# Patient Record
Sex: FEMALE | Race: AMERICAN INDIAN OR ALASKA NATIVE | Employment: OTHER | URBAN - METROPOLITAN AREA
[De-identification: names, ages, dates, MRNs, and addresses within clinical notes are randomized per-mention and may not be internally consistent; named-entity substitution may affect disease eponyms.]

---

## 2017-01-22 ENCOUNTER — HOSPITAL ENCOUNTER (EMERGENCY)
Age: 77
Discharge: HOME | End: 2017-01-22
Attending: EMERGENCY MEDICINE
Payer: MEDICARE

## 2017-01-22 ENCOUNTER — APPOINTMENT (OUTPATIENT)
Dept: GENERAL RADIOLOGY | Age: 77
End: 2017-01-22
Attending: EMERGENCY MEDICINE
Payer: MEDICARE

## 2017-01-22 VITALS
HEART RATE: 77 BPM | OXYGEN SATURATION: 96 % | DIASTOLIC BLOOD PRESSURE: 59 MMHG | BODY MASS INDEX: 25.72 KG/M2 | HEIGHT: 66 IN | TEMPERATURE: 99.1 F | SYSTOLIC BLOOD PRESSURE: 138 MMHG | WEIGHT: 160.05 LBS | RESPIRATION RATE: 18 BRPM

## 2017-01-22 DIAGNOSIS — J20.9 ACUTE BRONCHITIS, UNSPECIFIED ORGANISM: Primary | ICD-10-CM

## 2017-01-22 LAB
ALBUMIN SERPL BCP-MCNC: 3.4 G/DL (ref 3.5–5)
ALBUMIN/GLOB SERPL: 0.9 {RATIO} (ref 1.1–2.2)
ALP SERPL-CCNC: 61 U/L (ref 45–117)
ALT SERPL-CCNC: 15 U/L (ref 12–78)
ANION GAP BLD CALC-SCNC: 10 MMOL/L (ref 5–15)
AST SERPL W P-5'-P-CCNC: 15 U/L (ref 15–37)
BASOPHILS # BLD AUTO: 0 K/UL (ref 0–0.1)
BASOPHILS # BLD: 0 % (ref 0–1)
BILIRUB SERPL-MCNC: 0.1 MG/DL (ref 0.2–1)
BUN SERPL-MCNC: 16 MG/DL (ref 6–20)
BUN/CREAT SERPL: 18 (ref 12–20)
CALCIUM SERPL-MCNC: 8.9 MG/DL (ref 8.5–10.1)
CHLORIDE SERPL-SCNC: 105 MMOL/L (ref 97–108)
CO2 SERPL-SCNC: 25 MMOL/L (ref 21–32)
CREAT SERPL-MCNC: 0.87 MG/DL (ref 0.55–1.02)
EOSINOPHIL # BLD: 0.3 K/UL (ref 0–0.4)
EOSINOPHIL NFR BLD: 2 % (ref 0–7)
ERYTHROCYTE [DISTWIDTH] IN BLOOD BY AUTOMATED COUNT: 14.5 % (ref 11.5–14.5)
GLOBULIN SER CALC-MCNC: 4 G/DL (ref 2–4)
GLUCOSE SERPL-MCNC: 107 MG/DL (ref 65–100)
HCT VFR BLD AUTO: 32 % (ref 35–47)
HGB BLD-MCNC: 10.7 G/DL (ref 11.5–16)
LYMPHOCYTES # BLD AUTO: 27 % (ref 12–49)
LYMPHOCYTES # BLD: 3.5 K/UL (ref 0.8–3.5)
MAGNESIUM SERPL-MCNC: 2.1 MG/DL (ref 1.6–2.4)
MCH RBC QN AUTO: 28.4 PG (ref 26–34)
MCHC RBC AUTO-ENTMCNC: 33.4 G/DL (ref 30–36.5)
MCV RBC AUTO: 84.9 FL (ref 80–99)
MONOCYTES # BLD: 1.1 K/UL (ref 0–1)
MONOCYTES NFR BLD AUTO: 9 % (ref 5–13)
NEUTS SEG # BLD: 8.1 K/UL (ref 1.8–8)
NEUTS SEG NFR BLD AUTO: 62 % (ref 32–75)
PLATELET # BLD AUTO: 242 K/UL (ref 150–400)
POTASSIUM SERPL-SCNC: 4.1 MMOL/L (ref 3.5–5.1)
PROT SERPL-MCNC: 7.4 G/DL (ref 6.4–8.2)
RBC # BLD AUTO: 3.77 M/UL (ref 3.8–5.2)
SODIUM SERPL-SCNC: 140 MMOL/L (ref 136–145)
WBC # BLD AUTO: 13 K/UL (ref 3.6–11)

## 2017-01-22 PROCEDURE — 83735 ASSAY OF MAGNESIUM: CPT | Performed by: EMERGENCY MEDICINE

## 2017-01-22 PROCEDURE — A9270 NON-COVERED ITEM OR SERVICE: HCPCS | Performed by: EMERGENCY MEDICINE

## 2017-01-22 PROCEDURE — 94640 AIRWAY INHALATION TREATMENT: CPT

## 2017-01-22 PROCEDURE — 74011000250 HC RX REV CODE- 250: Performed by: EMERGENCY MEDICINE

## 2017-01-22 PROCEDURE — 71020 XR CHEST PA LAT: CPT

## 2017-01-22 PROCEDURE — 77030029684 HC NEB SM VOL KT MONA -A

## 2017-01-22 PROCEDURE — 85025 COMPLETE CBC W/AUTO DIFF WBC: CPT | Performed by: EMERGENCY MEDICINE

## 2017-01-22 PROCEDURE — 36415 COLL VENOUS BLD VENIPUNCTURE: CPT | Performed by: EMERGENCY MEDICINE

## 2017-01-22 PROCEDURE — 74011636637 HC RX REV CODE- 636/637: Performed by: EMERGENCY MEDICINE

## 2017-01-22 PROCEDURE — 80053 COMPREHEN METABOLIC PANEL: CPT | Performed by: EMERGENCY MEDICINE

## 2017-01-22 PROCEDURE — 99283 EMERGENCY DEPT VISIT LOW MDM: CPT

## 2017-01-22 RX ORDER — IPRATROPIUM BROMIDE AND ALBUTEROL SULFATE 2.5; .5 MG/3ML; MG/3ML
3 SOLUTION RESPIRATORY (INHALATION)
Status: COMPLETED | OUTPATIENT
Start: 2017-01-22 | End: 2017-01-22

## 2017-01-22 RX ORDER — GUAIFENESIN 600 MG/1
600 TABLET, EXTENDED RELEASE ORAL 2 TIMES DAILY
Qty: 15 TAB | Refills: 0 | Status: SHIPPED | OUTPATIENT
Start: 2017-01-22 | End: 2017-10-05

## 2017-01-22 RX ORDER — ALBUTEROL SULFATE 90 UG/1
2 AEROSOL, METERED RESPIRATORY (INHALATION)
Qty: 1 INHALER | Refills: 0 | Status: SHIPPED | OUTPATIENT
Start: 2017-01-22 | End: 2017-10-05

## 2017-01-22 RX ORDER — GUAIFENESIN 100 MG/5ML
81 LIQUID (ML) ORAL DAILY
COMMUNITY
End: 2019-12-11

## 2017-01-22 RX ORDER — PREDNISONE 10 MG/1
TABLET ORAL
Qty: 21 TAB | Refills: 0 | Status: SHIPPED | OUTPATIENT
Start: 2017-01-22 | End: 2019-09-02

## 2017-01-22 RX ORDER — PREDNISONE 20 MG/1
60 TABLET ORAL
Status: COMPLETED | OUTPATIENT
Start: 2017-01-22 | End: 2017-01-22

## 2017-01-22 RX ADMIN — IPRATROPIUM BROMIDE AND ALBUTEROL SULFATE 3 ML: .5; 3 SOLUTION RESPIRATORY (INHALATION) at 17:25

## 2017-01-22 RX ADMIN — PREDNISONE 60 MG: 20 TABLET ORAL at 18:18

## 2017-01-22 RX ADMIN — IPRATROPIUM BROMIDE AND ALBUTEROL SULFATE 3 ML: .5; 3 SOLUTION RESPIRATORY (INHALATION) at 16:50

## 2017-01-22 RX ADMIN — IPRATROPIUM BROMIDE AND ALBUTEROL SULFATE 3 ML: .5; 3 SOLUTION RESPIRATORY (INHALATION) at 17:05

## 2017-01-22 NOTE — ED PROVIDER NOTES
HPI Comments: Fadia Hoffman is a 68 y.o. female with PMHx significant for HTN, GERD, high cholesterol, who presents ambulatory with several family members to [facility] ED with cc of non-productive cough, which has been progressively worsening x 1 week. Pt states that her cough became significantly worse this evening so she decided to come to the ED for further evaluation. She additionally c/o recent chills and hot flashes, but denies measured fever or rigors. Pt reports that she has felt some minor associated SOB which is exacerbated when speaking. She denies any sick contacts, and specifically denies any CP, leg swelling, nausea, vomiting, diarrhea, and abdominal pain. PCP: Amanda French MD    PMHx is significant for: HTN, Depression, arrhythmia, GERD, arthritis, high cholesterol, diverticulitis, colon polyps  PSHx is significant for: B/L cataract removal, tubal ligation, hysterectomy, oophorectomy, lysis of adhesions, right TKR, MOHS procedure of right shoulder, right knee arthroscopy, bladder sling, appendectomy, colonoscopy   Social History: (-) Tobacco, (+) EtOH (very rare), (-) Illicit Drugs      There are no other complaints, changes, or physical findings at this time. Written by Cristóbal Diaz ED Scrib[employer]ana, as dictated by Diogenes Hernandez MD.      The history is provided by the patient. Past Medical History:   Diagnosis Date    Arrhythmia      ? Merlinda Aland    Colon polyps      Dr Karol Deras GERD (gastroesophageal reflux disease)      OCCASIONALLY    Hypertension     Nausea & vomiting     Other ill-defined conditions(799.89)      HI CHOLESTEROL    Other ill-defined conditions(799.89)      DIVERTICULOSIS (HX: DIVERTICULITIS)    Psychiatric disorder      DEPRESSION       Past Surgical History:   Procedure Laterality Date    Hx cataract removal       BILAT WITH IOL    Hx tubal ligation      Hx hysterectomy      Hx oophorectomy      Pr abdomen surgery proc unlisted       LYSIS OF ADHESIONS    Hx orthopaedic  6/2009     RIGHT TKR    Hx mohs procedure       RIGHT SHOULDER--ROTATOR CUFF    Hx knee arthroscopy       RIGHT KNEE     Hx urological       bladder sling    Hx appendectomy      Pr colonoscopy flx dx w/collj spec when pfrmd  6/25/2012               Family History:   Problem Relation Age of Onset    Post-op Nausea/Vomiting Sister     Post-op Nausea/Vomiting Brother     Cancer Other      colon cancer       Social History     Social History    Marital status:      Spouse name: N/A    Number of children: N/A    Years of education: N/A     Occupational History    Not on file. Social History Main Topics    Smoking status: Never Smoker    Smokeless tobacco: Never Used    Alcohol use No      Comment: VERY RARE    Drug use: No    Sexual activity: Not on file     Other Topics Concern    Not on file     Social History Narrative         ALLERGIES: Codeine    Review of Systems   Constitutional: Negative for appetite change, chills and fever. HENT: Negative for congestion. Eyes: Negative for visual disturbance. Respiratory: Positive for cough and shortness of breath. Negative for wheezing. Cardiovascular: Negative for chest pain, palpitations and leg swelling. Gastrointestinal: Negative for abdominal pain, diarrhea, nausea and vomiting. Genitourinary: Negative for dysuria, frequency and urgency. Musculoskeletal: Negative for back pain, joint swelling, myalgias and neck stiffness. Skin: Negative for rash. Neurological: Negative for dizziness, syncope, weakness and headaches. Hematological: Negative for adenopathy. Psychiatric/Behavioral: Negative for behavioral problems and dysphoric mood.        Vitals:    01/22/17 1610 01/22/17 1729 01/22/17 1743 01/22/17 1816   BP: 108/61 125/74 125/50 138/59   Pulse: 72 (!) 57 67 77   Resp: 18  18    Temp: 99.1 °F (37.3 °C)      SpO2: 97% 97% 99% 96%   Weight: 72.6 kg (160 lb 0.9 oz) Height: 5' 6\" (1.676 m)               Physical Exam   Constitutional: She is oriented to person, place, and time. She appears well-developed and well-nourished. No distress. HENT:   Head: Normocephalic and atraumatic. Mouth/Throat: Oropharynx is clear and moist.   Eyes: Conjunctivae and EOM are normal. Pupils are equal, round, and reactive to light. No scleral icterus. Neck: Normal range of motion. Neck supple. Cardiovascular: Normal rate and regular rhythm. Exam reveals no gallop. Murmur heard. Systolic murmur is present with a grade of 1/6   Pulmonary/Chest: Effort normal. No stridor. No respiratory distress. She has no wheezes. She has no rales. Abdominal: Soft. Bowel sounds are normal. She exhibits no distension and no mass. There is no tenderness. There is no rebound and no guarding. Musculoskeletal: Normal range of motion. No bilateral lower extremity edema   Lymphadenopathy:     She has no cervical adenopathy. Neurological: She is alert and oriented to person, place, and time. No cranial nerve deficit. Coordination normal.   Skin: Skin is warm and dry. No rash noted. No erythema. Psychiatric: She has a normal mood and affect. Nursing note and vitals reviewed. MDM  Number of Diagnoses or Management Options  Diagnosis management comments: DDx: PNA, bronchitis, URI       Amount and/or Complexity of Data Reviewed  Clinical lab tests: reviewed and ordered  Tests in the radiology section of CPT®: ordered and reviewed  Review and summarize past medical records: yes    Patient Progress  Patient progress: stable    ED Course       Procedures    6:02 PM  Unremarkable workup, C/W acute bronchitis.  Will treat with steroids and beta agonist.  Shane Burroughs MD        LABORATORY TESTS:  Recent Results (from the past 12 hour(s))   METABOLIC PANEL, COMPREHENSIVE    Collection Time: 01/22/17  5:14 PM   Result Value Ref Range    Sodium 140 136 - 145 mmol/L    Potassium 4.1 3.5 - 5.1 mmol/L    Chloride 105 97 - 108 mmol/L    CO2 25 21 - 32 mmol/L    Anion gap 10 5 - 15 mmol/L    Glucose 107 (H) 65 - 100 mg/dL    BUN 16 6 - 20 MG/DL    Creatinine 0.87 0.55 - 1.02 MG/DL    BUN/Creatinine ratio 18 12 - 20      GFR est AA >60 >60 ml/min/1.73m2    GFR est non-AA >60 >60 ml/min/1.73m2    Calcium 8.9 8.5 - 10.1 MG/DL    Bilirubin, total 0.1 (L) 0.2 - 1.0 MG/DL    ALT 15 12 - 78 U/L    AST 15 15 - 37 U/L    Alk. phosphatase 61 45 - 117 U/L    Protein, total 7.4 6.4 - 8.2 g/dL    Albumin 3.4 (L) 3.5 - 5.0 g/dL    Globulin 4.0 2.0 - 4.0 g/dL    A-G Ratio 0.9 (L) 1.1 - 2.2     CBC WITH AUTOMATED DIFF    Collection Time: 01/22/17  5:14 PM   Result Value Ref Range    WBC 13.0 (H) 3.6 - 11.0 K/uL    RBC 3.77 (L) 3.80 - 5.20 M/uL    HGB 10.7 (L) 11.5 - 16.0 g/dL    HCT 32.0 (L) 35.0 - 47.0 %    MCV 84.9 80.0 - 99.0 FL    MCH 28.4 26.0 - 34.0 PG    MCHC 33.4 30.0 - 36.5 g/dL    RDW 14.5 11.5 - 14.5 %    PLATELET 865 594 - 113 K/uL    NEUTROPHILS 62 32 - 75 %    LYMPHOCYTES 27 12 - 49 %    MONOCYTES 9 5 - 13 %    EOSINOPHILS 2 0 - 7 %    BASOPHILS 0 0 - 1 %    ABS. NEUTROPHILS 8.1 (H) 1.8 - 8.0 K/UL    ABS. LYMPHOCYTES 3.5 0.8 - 3.5 K/UL    ABS. MONOCYTES 1.1 (H) 0.0 - 1.0 K/UL    ABS. EOSINOPHILS 0.3 0.0 - 0.4 K/UL    ABS. BASOPHILS 0.0 0.0 - 0.1 K/UL   MAGNESIUM    Collection Time: 01/22/17  5:14 PM   Result Value Ref Range    Magnesium 2.1 1.6 - 2.4 mg/dL       IMAGING RESULTS:  XR CHEST PA LAT   Final Result   INDICATION: Nonproductive cough x1 week.     COMPARISON: 4/9/2015.     FINDINGS: PA and lateral views were obtained of the chest.   The heart is normal in size. The lungs are clear. No consolidation, pulmonary edema, or pleural effusion is evident.    The regional osseous structures are unremarkable.      IMPRESSION  IMPRESSION:   No evidence of pneumonia.           MEDICATIONS GIVEN:  Medications   predniSONE (DELTASONE) tablet 60 mg (not administered)   albuterol-ipratropium (DUO-NEB) 2.5 MG-0.5 MG/3 ML (3 mL Nebulization Given 1/22/17 8865)       IMPRESSION:  1. Acute bronchitis, unspecified organism        PLAN:  1. Current Discharge Medication List      START taking these medications    Details   predniSONE (STERAPRED DS) 10 mg dose pack As directed  Qty: 21 Tab, Refills: 0      albuterol (VENTOLIN HFA) 90 mcg/actuation inhaler Take 2 Puffs by inhalation every four (4) hours as needed for Wheezing or Shortness of Breath. Qty: 1 Inhaler, Refills: 0      guaiFENesin ER (MUCINEX) 600 mg ER tablet Take 1 Tab by mouth two (2) times a day. Qty: 15 Tab, Refills: 0         CONTINUE these medications which have NOT CHANGED    Details   aspirin 81 mg chewable tablet Take 81 mg by mouth daily. omega-3 fatty acids-vitamin e (FISH OIL) 1,000 mg cap Take 1 Cap by mouth. Calcium-Cholecalciferol, D3, (CALCIUM 600 WITH VITAMIN D3) 600 mg(1,500mg) -400 unit chew Take 600 mg by mouth. timolol maleate 0.5 % DrpD ophthalmic solution Administer 1 Drop to left eye nightly. FLUOROMETHOLONE (FML FORTE OP) Apply 1 Drop to eye daily (before breakfast). lisinopril (PRINIVIL, ZESTRIL) 20 mg tablet Take  by mouth daily. lovastatin (MEVACOR) 20 mg tablet Take 20 mg by mouth nightly. citalopram (CELEXA) 20 mg tablet Take 10 mg by mouth nightly. 2.   Follow-up Information     Follow up With Details Comments Gregorio Rojo MD In 2 days If symptoms worsen Victor [phone]          3. Return to ED if worse      Discharge Note:  6:14 PM  The patient has been re-evaluated and is ready for discharge. Reviewed available results with patient. Counseled patient on diagnosis and care plan. Patient has expressed understanding, and all questions have been answered. Patient agrees with plan and agrees to follow up as recommended, or to return to the ED if their symptoms worsen.  Discharge instructions have been provided and explained to the patient, along with reasons to return to the ED. This note is prepared by Lucie Keareny, acting as Scribe for Erik Jackson MD.     Erik Jackson MD: The scribe's documentation has been prepared under my direction and personally reviewed by me in its entirety. I confirm that the note above accurately reflects all work, treatment, procedures, and medical decision making performed by me.

## 2017-01-22 NOTE — DISCHARGE INSTRUCTIONS
Bronchitis: Care Instructions  Your Care Instructions    Bronchitis is inflammation of the bronchial tubes, which carry air to the lungs. The tubes swell and produce mucus, or phlegm. The mucus and inflamed bronchial tubes make you cough. You may have trouble breathing. Most cases of bronchitis are caused by viruses like those that cause colds. Antibiotics usually do not help and they may be harmful. Bronchitis usually develops rapidly and lasts about 2 to 3 weeks in otherwise healthy people. Follow-up care is a key part of your treatment and safety. Be sure to make and go to all appointments, and call your doctor if you are having problems. It's also a good idea to know your test results and keep a list of the medicines you take. How can you care for yourself at home? · Take all medicines exactly as prescribed. Call your doctor if you think you are having a problem with your medicine. · Get some extra rest.  · Take an over-the-counter pain medicine, such as acetaminophen (Tylenol), ibuprofen (Advil, Motrin), or naproxen (Aleve) to reduce fever and relieve body aches. Read and follow all instructions on the label. · Do not take two or more pain medicines at the same time unless the doctor told you to. Many pain medicines have acetaminophen, which is Tylenol. Too much acetaminophen (Tylenol) can be harmful. · Take an over-the-counter cough medicine that contains dextromethorphan to help quiet a dry, hacking cough so that you can sleep. Avoid cough medicines that have more than one active ingredient. Read and follow all instructions on the label. · Breathe moist air from a humidifier, hot shower, or sink filled with hot water. The heat and moisture will thin mucus so you can cough it out. · Do not smoke. Smoking can make bronchitis worse. If you need help quitting, talk to your doctor about stop-smoking programs and medicines. These can increase your chances of quitting for good.   When should you call for help? Call 911 anytime you think you may need emergency care. For example, call if:  · You have severe trouble breathing. Call your doctor now or seek immediate medical care if:  · You have new or worse trouble breathing. · You cough up dark brown or bloody mucus (sputum). · You have a new or higher fever. · You have a new rash. Watch closely for changes in your health, and be sure to contact your doctor if:  · You cough more deeply or more often, especially if you notice more mucus or a change in the color of your mucus. · You are not getting better as expected. Where can you learn more? Go to http://jf-abel.info/. Enter H333 in the search box to learn more about \"Bronchitis: Care Instructions. \"  Current as of: May 23, 2016  Content Version: 11.1  © 3477-6787 [employer]. Care instructions adapted under license by [employer] (which disclaims liability or warranty for this information). If you have questions about a medical condition or this instruction, always ask your healthcare professional. [address] any warranty or liability for your use of this information.

## 2017-05-31 ENCOUNTER — HOSPITAL ENCOUNTER (OUTPATIENT)
Dept: MAMMOGRAPHY | Age: 77
Discharge: HOME | End: 2017-05-31
Attending: FAMILY MEDICINE
Payer: MEDICARE

## 2017-05-31 DIAGNOSIS — Z12.31 VISIT FOR SCREENING MAMMOGRAM: ICD-10-CM

## 2017-05-31 PROCEDURE — 77067 SCR MAMMO BI INCL CAD: CPT

## 2017-10-05 ENCOUNTER — APPOINTMENT (OUTPATIENT)
Dept: CT IMAGING | Age: 77
End: 2017-10-05
Attending: EMERGENCY MEDICINE
Payer: MEDICARE

## 2017-10-05 ENCOUNTER — HOSPITAL ENCOUNTER (EMERGENCY)
Age: 77
Discharge: HOME | End: 2017-10-05
Attending: EMERGENCY MEDICINE
Payer: MEDICARE

## 2017-10-05 VITALS
TEMPERATURE: 98.6 F | SYSTOLIC BLOOD PRESSURE: 155 MMHG | BODY MASS INDEX: 25.01 KG/M2 | WEIGHT: 154.98 LBS | OXYGEN SATURATION: 96 % | HEART RATE: 53 BPM | DIASTOLIC BLOOD PRESSURE: 55 MMHG | RESPIRATION RATE: 16 BRPM

## 2017-10-05 DIAGNOSIS — G44.209 TENSION-TYPE HEADACHE, NOT INTRACTABLE, UNSPECIFIED CHRONICITY PATTERN: Primary | ICD-10-CM

## 2017-10-05 DIAGNOSIS — R03.0 ELEVATED BLOOD PRESSURE, SITUATIONAL: ICD-10-CM

## 2017-10-05 PROCEDURE — 99284 EMERGENCY DEPT VISIT MOD MDM: CPT

## 2017-10-05 PROCEDURE — 74011250637 HC RX REV CODE- 250/637: Performed by: EMERGENCY MEDICINE

## 2017-10-05 PROCEDURE — 70450 CT HEAD/BRAIN W/O DYE: CPT

## 2017-10-05 PROCEDURE — 99285 EMERGENCY DEPT VISIT HI MDM: CPT

## 2017-10-05 RX ORDER — BUTALBITAL, ACETAMINOPHEN AND CAFFEINE 300; 40; 50 MG/1; MG/1; MG/1
1 CAPSULE ORAL
Qty: 10 CAP | Refills: 0 | Status: SHIPPED | OUTPATIENT
Start: 2017-10-05 | End: 2019-11-19 | Stop reason: CLARIF

## 2017-10-05 RX ORDER — ACETAMINOPHEN 500 MG
1000 TABLET ORAL
COMMUNITY
End: 2019-12-11

## 2017-10-05 RX ORDER — BUTALBITAL, ACETAMINOPHEN AND CAFFEINE 50; 325; 40 MG/1; MG/1; MG/1
1 TABLET ORAL
Status: COMPLETED | OUTPATIENT
Start: 2017-10-05 | End: 2017-10-05

## 2017-10-05 RX ADMIN — BUTALBITAL, ACETAMINOPHEN AND CAFFEINE 1 TABLET: 50; 325; 40 TABLET ORAL at 12:42

## 2017-10-05 NOTE — ED NOTES
Dr. Messi Arce reviewed discharge instructions with the patient. The patient verbalized understanding.

## 2017-10-05 NOTE — ED PROVIDER NOTES
[address]  EMERGENCY DEPARTMENT HISTORY AND PHYSICAL EXAM       Date of Service: 10/5/2017   Patient Name: Yuan Turner   YOB: 1940  Medical Record Number: [id number]    History of Presenting Illness     Chief Complaint   Patient presents with    Headache     pt c/o headache and hypertension since this morning. Pt took two tylenol with minimal relief.  Hypertension        History Provided By:  patient    Additional History:   Yuan Turner is a 68 y.o. female with PMhx significant for HTN who presents ambulatory to the ED with cc of elevated HTN with associated headache that began while at rest this morning. Pt states that the headache has been intermittent since onset, noting pain is mild at time of exam. She reports use of Tylenol this morning with some relief and notes resting helps alleviate pain. She denies any exacerbating factors. She notes her blood pressure was 192/76 this morning for which she was concerned. She endorses compliance in taking her HTN medications. She denies any hx of CVA/TIA. She specifically denies any fevers, chills, nausea, vomiting, chest pain, shortness of breath, rash, diarrhea, sweating or weight loss. Social Hx: - Tobacco, - EtOH, - Illicit Drugs    There are no other complaints, changes or physical findings at this time. Primary Care Provider: Brittany Delgadillo MD     Past History     Past Medical History:   Past Medical History:   Diagnosis Date    Arrhythmia     ? Emily Krause    Colon polyps     Dr Domi Han GERD (gastroesophageal reflux disease)     OCCASIONALLY    Hypertension     Nausea & vomiting     Other ill-defined conditions(799.89)     HI CHOLESTEROL    Other ill-defined conditions(799.89)     DIVERTICULOSIS (HX: DIVERTICULITIS)    Psychiatric disorder     DEPRESSION        Past Surgical History:   Past Surgical History:   Procedure Laterality Date    ABDOMEN SURGERY PROC UNLISTED      LYSIS OF ADHESIONS    HX APPENDECTOMY      HX CATARACT REMOVAL      BILAT WITH IOL    HX HYSTERECTOMY      HX KNEE ARTHROSCOPY      RIGHT KNEE     HX MOHS PROCEDURES      RIGHT SHOULDER--ROTATOR CUFF    HX OOPHORECTOMY      HX ORTHOPAEDIC  6/2009    RIGHT TKR    HX TUBAL LIGATION      HX UROLOGICAL      bladder sling    MI COLONOSCOPY FLX DX W/COLLJ SPEC WHEN PFRMD  6/25/2012             Family History:   Family History   Problem Relation Age of Onset    Cancer Other      colon cancer    Post-op Nausea/Vomiting Sister     Breast Cancer Sister      over 48    Post-op Nausea/Vomiting Brother         Social History:   Social History   Substance Use Topics    Smoking status: Never Smoker    Smokeless tobacco: Never Used    Alcohol use Yes      Comment: VERY RARE        Allergies: Allergies   Allergen Reactions    Codeine Nausea and Vomiting         Review of Systems   Review of Systems   Constitutional: Negative. Negative for activity change, appetite change, chills, fatigue, fever and unexpected weight change. HENT: Negative. Negative for congestion, hearing loss, rhinorrhea, sneezing and voice change. Eyes: Negative. Negative for pain and visual disturbance. Respiratory: Negative. Negative for apnea, cough, choking, chest tightness and shortness of breath. Cardiovascular: Negative for chest pain and palpitations. Positive for elevated blood pressure. Gastrointestinal: Negative. Negative for abdominal distention, abdominal pain, blood in stool, diarrhea, nausea and vomiting. Genitourinary: Negative. Negative for difficulty urinating, flank pain, frequency and urgency. No discharge   Musculoskeletal: Negative. Negative for arthralgias, back pain, myalgias and neck stiffness. Skin: Negative. Negative for color change and rash. Neurological: Positive for headaches.  Negative for dizziness, seizures, syncope, speech difficulty, weakness and numbness. Hematological: Negative for adenopathy. Psychiatric/Behavioral: Negative. Negative for agitation, behavioral problems, dysphoric mood and suicidal ideas. The patient is not nervous/anxious. Physical Exam  Physical Exam   Constitutional: She is oriented to person, place, and time. She appears well-developed and well-nourished. No distress. HENT:   Head: Normocephalic and atraumatic. Mouth/Throat: Oropharynx is clear and moist. No oropharyngeal exudate. Eyes: Conjunctivae and EOM are normal. Pupils are equal, round, and reactive to light. Right eye exhibits no discharge. Left eye exhibits no discharge. Neck: Normal range of motion. Neck supple. Cardiovascular: Normal rate, regular rhythm and intact distal pulses. Exam reveals no gallop and no friction rub. No murmur heard. Pulmonary/Chest: Effort normal and breath sounds normal. No respiratory distress. She has no wheezes. She has no rales. She exhibits no tenderness. Abdominal: Soft. Bowel sounds are normal. She exhibits no distension and no mass. There is no tenderness. There is no rebound and no guarding. Musculoskeletal: Normal range of motion. She exhibits no edema. Lymphadenopathy:     She has no cervical adenopathy. Neurological: She is alert and oriented to person, place, and time. No cranial nerve deficit. Coordination normal.   Skin: Skin is warm and dry. No rash noted. No erythema. Psychiatric: She has a normal mood and affect. Nursing note and vitals reviewed. Medical Decision Making   I am the first provider for this patient. I reviewed the vital signs, available nursing notes, past medical history, past surgical history, family history and social history. Old Medical Records: Old medical records. Provider Notes:   DDx: Vascular HA, vascular tension cluster    ED Course:  12:33 PM   Initial assessment performed.  The patients presenting problems have been discussed, and they are in agreement with the care plan formulated and outlined with them. I have encouraged them to ask questions as they arise throughout their visit. Progress Notes:   1:58 PM  The patient states that their symptoms have resolved and they feel much better. There are no other new complaints at this time. Her questions have been answered. We are awaiting final results and those will be reviewed with them when they become available. Diagnostic Study Results   Radiologic Studies -  The following have been ordered and reviewed:  CT Results  (Last 48 hours)               10/05/17 1326  CT HEAD WO CONT Final result    Impression:  IMPRESSION: No evidence of acute process. Narrative:  EXAM:  CT HEAD WO CONT       INDICATION:   Headache       COMPARISON: None. CONTRAST:  None. TECHNIQUE: Unenhanced CT of the head was performed using 5 mm images. Brain and   bone windows were generated. CT dose reduction was achieved through use of a   standardized protocol tailored for this examination and automatic exposure   control for dose modulation. FINDINGS:   The ventricles and sulci are normal in size, shape and configuration and   midline. There is no significant white matter disease. Several incidental dural   calcifications are seen along the left tentorium There is no intracranial   hemorrhage, extra-axial collection, mass, mass effect or midline shift. The   basilar cisterns are open. No acute infarct is identified. The bone windows   demonstrate no abnormalities. The visualized portions of the paranasal sinuses   and mastoid air cells are clear. Vital Signs-Reviewed the patient's vital signs.    Patient Vitals for the past 12 hrs:   Temp Pulse Resp BP SpO2   10/05/17 1400 - - - 155/55 96 %   10/05/17 1315 - - - 167/73 98 %   10/05/17 1245 - - - 173/63 97 %   10/05/17 1200 - - - 192/74 97 %   10/05/17 1159 98.6 °F (37 °C) (!) 53 16 192/74 96 %       Medications Given in the ED:  Medications   butalbital-acetaminophen-caffeine (FIORICET, ESGIC) -40 mg per tablet 1 Tab (1 Tab Oral Given 10/5/17 1242)       Diagnosis   Clinical Impression:   1. Tension-type headache, not intractable, unspecified chronicity pattern    2. Elevated blood pressure, situational         Plan:  1:   Follow-up Information     Follow up With Details Comments Contact Info    Leila Hines MD Call in 2 days As needed, If symptoms worsen Jd [phone]          2:   Current Discharge Medication List      START taking these medications    Details   butalbital-acetaminophen-caff (FIORICET) -40 mg per capsule Take 1 Cap by mouth every four (4) hours as needed for Pain. Max Daily Amount: 6 Caps. Qty: 10 Cap, Refills: 0           Return to ED if Worse    Disposition Note:  2:02 PM  Caro Adams  results have been reviewed with her. She has been counseled regarding her diagnosis. She verbally conveys understanding and agreement of the signs, symptoms, diagnosis, treatment and prognosis and additionally agrees to follow up as recommended with Dr. Leila Hines MD in 24 - 48 hours. She also agrees with the care-plan and conveys that all of her questions have been answered. I have also put together some discharge instructions for her that include: 1) educational information regarding their diagnosis, 2) how to care for their diagnosis at home, as well a 3) list of reasons why they would want to return to the ED prior to their follow-up appointment, should their condition change.    _______________________________   Attestations: This note is prepared by Jose L Broderick, acting as Scribe for Maksim Wong. Samanta Hwang, [address] [facility] Samanta Hwang MD: The scribe's documentation has been prepared under my direction and personally reviewed by me in its entirety.  I confirm that the note above accurately reflects all work, treatment, procedures, and medical decision making performed by me.  _______________________________

## 2017-10-05 NOTE — DISCHARGE INSTRUCTIONS
Elevated Blood Pressure: Care Instructions  Your Care Instructions    Blood pressure is a measure of how hard the blood pushes against the walls of your arteries. It's normal for blood pressure to go up and down throughout the day. But if it stays up over time, you have high blood pressure. Two numbers tell you your blood pressure. The first number is the systolic pressure. It shows how hard the blood pushes when your heart is pumping. The second number is the diastolic pressure. It shows how hard the blood pushes between heartbeats, when your heart is relaxed and filling with blood. An ideal blood pressure in adults is less than 120/80 (say \"120 over 80\"). High blood pressure is 140/90 or higher. You have high blood pressure if your top number is 140 or higher or your bottom number is 90 or higher, or both. The main test for high blood pressure is simple, fast, and painless. To diagnose high blood pressure, your doctor will test your blood pressure at different times. After testing your blood pressure, your doctor may ask you to test it again when you are home. If you are diagnosed with high blood pressure, you can work with your doctor to make a long-term plan to manage it. Follow-up care is a key part of your treatment and safety. Be sure to make and go to all appointments, and call your doctor if you are having problems. It's also a good idea to know your test results and keep a list of the medicines you take. How can you care for yourself at home? · Do not smoke. Smoking increases your risk for heart attack and stroke. If you need help quitting, talk to your doctor about stop-smoking programs and medicines. These can increase your chances of quitting for good. · Stay at a healthy weight. · Try to limit how much sodium you eat to less than 2,300 milligrams (mg) a day. Your doctor may ask you to try to eat less than 1,500 mg a day. · Be physically active.  Get at least 30 minutes of exercise on most days of the week. Walking is a good choice. You also may want to do other activities, such as running, swimming, cycling, or playing tennis or team sports. · Avoid or limit alcohol. Talk to your doctor about whether you can drink any alcohol. · Eat plenty of fruits, vegetables, and low-fat dairy products. Eat less saturated and total fats. · Learn how to check your blood pressure at home. When should you call for help? Call your doctor now or seek immediate medical care if:  · Your blood pressure is much higher than normal (such as 180/110 or higher). · You think high blood pressure is causing symptoms such as:  ¨ Severe headache. ¨ Blurry vision. Watch closely for changes in your health, and be sure to contact your doctor if:  · You do not get better as expected. Where can you learn more? Go to http://jfOralWiseabel.info/. Enter U855 in the search box to learn more about \"Elevated Blood Pressure: Care Instructions. \"  Current as of: April 3, 2017  Content Version: 11.3  © 2568-9989 [employer]. Care instructions adapted under license by [employer] (which disclaims liability or warranty for this information). If you have questions about a medical condition or this instruction, always ask your healthcare professional. [address] any warranty or liability for your use of this information. Headache: Care Instructions  Your Care Instructions    Headaches have many possible causes. Most headaches aren't a sign of a more serious problem, and they will get better on their own. Home treatment may help you feel better faster. The doctor has checked you carefully, but problems can develop later. If you notice any problems or new symptoms, get medical treatment right away. Follow-up care is a key part of your treatment and safety. Be sure to make and go to all appointments, and call your doctor if you are having problems.  It's also a good idea to know your test results and keep a list of the medicines you take. How can you care for yourself at home? · Do not drive if you have taken a prescription pain medicine. · Rest in a quiet, dark room until your headache is gone. Close your eyes and try to relax or go to sleep. Don't watch TV or read. · Put a cold, moist cloth or cold pack on the painful area for 10 to 20 minutes at a time. Put a thin cloth between the cold pack and your skin. · Use a warm, moist towel or a heating pad set on low to relax tight shoulder and neck muscles. · Have someone gently massage your neck and shoulders. · Take pain medicines exactly as directed. ¨ If the doctor gave you a prescription medicine for pain, take it as prescribed. ¨ If you are not taking a prescription pain medicine, ask your doctor if you can take an over-the-counter medicine. · Be careful not to take pain medicine more often than the instructions allow, because you may get worse or more frequent headaches when the medicine wears off. · Do not ignore new symptoms that occur with a headache, such as a fever, weakness or numbness, vision changes, or confusion. These may be signs of a more serious problem. To prevent headaches  · Keep a headache diary so you can figure out what triggers your headaches. Avoiding triggers may help you prevent headaches. Record when each headache began, how long it lasted, and what the pain was like (throbbing, aching, stabbing, or dull). Write down any other symptoms you had with the headache, such as nausea, flashing lights or dark spots, or sensitivity to bright light or loud noise. Note if the headache occurred near your period. List anything that might have triggered the headache, such as certain foods (chocolate, cheese, wine) or odors, smoke, bright light, stress, or lack of sleep. · Find healthy ways to deal with stress. Headaches are most common during or right after stressful times.  Take time to relax before and after you do something that has caused a headache in the past.  · Try to keep your muscles relaxed by keeping good posture. Check your jaw, face, neck, and shoulder muscles for tension, and try relaxing them. When sitting at a desk, change positions often, and stretch for 30 seconds each hour. · Get plenty of sleep and exercise. · Eat regularly and well. Long periods without food can trigger a headache. · Treat yourself to a massage. Some people find that regular massages are very helpful in relieving tension. · Limit caffeine by not drinking too much coffee, tea, or soda. But don't quit caffeine suddenly, because that can also give you headaches. · Reduce eyestrain from computers by blinking frequently and looking away from the computer screen every so often. Make sure you have proper eyewear and that your monitor is set up properly, about an arm's length away. · Seek help if you have depression or anxiety. Your headaches may be linked to these conditions. Treatment can both prevent headaches and help with symptoms of anxiety or depression. When should you call for help? Call 911 anytime you think you may need emergency care. For example, call if:  · You have signs of a stroke. These may include:  ¨ Sudden numbness, paralysis, or weakness in your face, arm, or leg, especially on only one side of your body. ¨ Sudden vision changes. ¨ Sudden trouble speaking. ¨ Sudden confusion or trouble understanding simple statements. ¨ Sudden problems with walking or balance. ¨ A sudden, severe headache that is different from past headaches. Call your doctor now or seek immediate medical care if:  · You have a new or worse headache. · Your headache gets much worse. Where can you learn more? Go to http://jf-abel.info/. Enter M271 in the search box to learn more about \"Headache: Care Instructions. \"  Current as of: October 14, 2016  Content Version: 11.3  © 4755-6208 [employer]. Care instructions adapted under license by [employer] (which disclaims liability or warranty for this information). If you have questions about a medical condition or this instruction, always ask your healthcare professional. [address] any warranty or liability for your use of this information.

## 2018-06-13 ENCOUNTER — HOSPITAL ENCOUNTER (OUTPATIENT)
Dept: MAMMOGRAPHY | Age: 78
Discharge: HOME | End: 2018-06-13
Attending: FAMILY MEDICINE
Payer: MEDICARE

## 2018-06-13 DIAGNOSIS — Z12.31 VISIT FOR SCREENING MAMMOGRAM: ICD-10-CM

## 2018-06-13 PROCEDURE — 77067 SCR MAMMO BI INCL CAD: CPT

## 2018-07-16 ENCOUNTER — HOSPITAL ENCOUNTER (OUTPATIENT)
Dept: BONE DENSITY | Age: 78
Discharge: HOME | End: 2018-07-16
Attending: FAMILY MEDICINE
Payer: MEDICARE

## 2018-07-16 DIAGNOSIS — Z13.820 SCREENING FOR OSTEOPOROSIS: ICD-10-CM

## 2018-07-16 DIAGNOSIS — Z78.0 MENOPAUSE: ICD-10-CM

## 2018-07-16 PROCEDURE — 77080 DXA BONE DENSITY AXIAL: CPT

## 2019-05-15 ENCOUNTER — HOSPITAL ENCOUNTER (EMERGENCY)
Age: 79
Discharge: HOME | End: 2019-05-15
Attending: EMERGENCY MEDICINE
Payer: MEDICARE

## 2019-05-15 ENCOUNTER — APPOINTMENT (OUTPATIENT)
Dept: CT IMAGING | Age: 79
End: 2019-05-15
Attending: EMERGENCY MEDICINE
Payer: MEDICARE

## 2019-05-15 VITALS
HEART RATE: 65 BPM | SYSTOLIC BLOOD PRESSURE: 142 MMHG | DIASTOLIC BLOOD PRESSURE: 54 MMHG | WEIGHT: 152.78 LBS | TEMPERATURE: 98.9 F | HEIGHT: 66 IN | RESPIRATION RATE: 16 BRPM | BODY MASS INDEX: 24.55 KG/M2 | OXYGEN SATURATION: 96 %

## 2019-05-15 DIAGNOSIS — N39.0 URINARY TRACT INFECTION WITHOUT HEMATURIA, SITE UNSPECIFIED: Primary | ICD-10-CM

## 2019-05-15 DIAGNOSIS — R10.84 ABDOMINAL PAIN, GENERALIZED: ICD-10-CM

## 2019-05-15 LAB
ALBUMIN SERPL-MCNC: 3.6 G/DL (ref 3.5–5)
ALBUMIN/GLOB SERPL: 0.9 {RATIO} (ref 1.1–2.2)
ALP SERPL-CCNC: 56 U/L (ref 45–117)
ALT SERPL-CCNC: 16 U/L (ref 12–78)
ANION GAP SERPL CALC-SCNC: 4 MMOL/L (ref 5–15)
APPEARANCE UR: CLEAR
AST SERPL-CCNC: 18 U/L (ref 15–37)
BACTERIA URNS QL MICRO: NEGATIVE /HPF
BASOPHILS # BLD: 0 K/UL (ref 0–0.1)
BASOPHILS NFR BLD: 0 % (ref 0–1)
BILIRUB SERPL-MCNC: 0.2 MG/DL (ref 0.2–1)
BILIRUB UR QL: NEGATIVE
BUN SERPL-MCNC: 28 MG/DL (ref 6–20)
BUN/CREAT SERPL: 32 (ref 12–20)
CALCIUM SERPL-MCNC: 8.7 MG/DL (ref 8.5–10.1)
CHLORIDE SERPL-SCNC: 105 MMOL/L (ref 97–108)
CO2 SERPL-SCNC: 30 MMOL/L (ref 21–32)
COLOR UR: ABNORMAL
CREAT SERPL-MCNC: 0.88 MG/DL (ref 0.55–1.02)
DIFFERENTIAL METHOD BLD: ABNORMAL
EOSINOPHIL # BLD: 0.2 K/UL (ref 0–0.4)
EOSINOPHIL NFR BLD: 1 % (ref 0–7)
EPITH CASTS URNS QL MICRO: ABNORMAL /LPF
ERYTHROCYTE [DISTWIDTH] IN BLOOD BY AUTOMATED COUNT: 15.4 % (ref 11.5–14.5)
GLOBULIN SER CALC-MCNC: 4.1 G/DL (ref 2–4)
GLUCOSE SERPL-MCNC: 99 MG/DL (ref 65–100)
GLUCOSE UR STRIP.AUTO-MCNC: NEGATIVE MG/DL
HCT VFR BLD AUTO: 35.7 % (ref 35–47)
HGB BLD-MCNC: 11.6 G/DL (ref 11.5–16)
HGB UR QL STRIP: NEGATIVE
HYALINE CASTS URNS QL MICRO: ABNORMAL /LPF (ref 0–5)
IMM GRANULOCYTES # BLD AUTO: 0.1 K/UL (ref 0–0.04)
IMM GRANULOCYTES NFR BLD AUTO: 0 % (ref 0–0.5)
KETONES UR QL STRIP.AUTO: NEGATIVE MG/DL
LEUKOCYTE ESTERASE UR QL STRIP.AUTO: ABNORMAL
LIPASE SERPL-CCNC: 165 U/L (ref 73–393)
LYMPHOCYTES # BLD: 2.2 K/UL (ref 0.8–3.5)
LYMPHOCYTES NFR BLD: 15 % (ref 12–49)
MCH RBC QN AUTO: 28.8 PG (ref 26–34)
MCHC RBC AUTO-ENTMCNC: 32.5 G/DL (ref 30–36.5)
MCV RBC AUTO: 88.6 FL (ref 80–99)
MONOCYTES # BLD: 0.9 K/UL (ref 0–1)
MONOCYTES NFR BLD: 6 % (ref 5–13)
NEUTS SEG # BLD: 11.4 K/UL (ref 1.8–8)
NEUTS SEG NFR BLD: 78 % (ref 32–75)
NITRITE UR QL STRIP.AUTO: NEGATIVE
NRBC # BLD: 0 K/UL (ref 0–0.01)
NRBC BLD-RTO: 0 PER 100 WBC
PH UR STRIP: 6.5 [PH] (ref 5–8)
PLATELET # BLD AUTO: 274 K/UL (ref 150–400)
PMV BLD AUTO: 11.6 FL (ref 8.9–12.9)
POTASSIUM SERPL-SCNC: 3.8 MMOL/L (ref 3.5–5.1)
PROT SERPL-MCNC: 7.7 G/DL (ref 6.4–8.2)
PROT UR STRIP-MCNC: NEGATIVE MG/DL
RBC # BLD AUTO: 4.03 M/UL (ref 3.8–5.2)
RBC #/AREA URNS HPF: ABNORMAL /HPF (ref 0–5)
SODIUM SERPL-SCNC: 139 MMOL/L (ref 136–145)
SP GR UR REFRACTOMETRY: 1.02 (ref 1–1.03)
UR CULT HOLD, URHOLD: NORMAL
UROBILINOGEN UR QL STRIP.AUTO: 0.2 EU/DL (ref 0.2–1)
WBC # BLD AUTO: 14.6 K/UL (ref 3.6–11)
WBC URNS QL MICRO: ABNORMAL /HPF (ref 0–4)

## 2019-05-15 PROCEDURE — 96361 HYDRATE IV INFUSION ADD-ON: CPT

## 2019-05-15 PROCEDURE — 85025 COMPLETE CBC W/AUTO DIFF WBC: CPT

## 2019-05-15 PROCEDURE — 74011000258 HC RX REV CODE- 258: Performed by: EMERGENCY MEDICINE

## 2019-05-15 PROCEDURE — 99283 EMERGENCY DEPT VISIT LOW MDM: CPT

## 2019-05-15 PROCEDURE — 36415 COLL VENOUS BLD VENIPUNCTURE: CPT

## 2019-05-15 PROCEDURE — 74177 CT ABD & PELVIS W/CONTRAST: CPT

## 2019-05-15 PROCEDURE — 81001 URINALYSIS AUTO W/SCOPE: CPT

## 2019-05-15 PROCEDURE — 74011636320 HC RX REV CODE- 636/320: Performed by: EMERGENCY MEDICINE

## 2019-05-15 PROCEDURE — 74011250636 HC RX REV CODE- 250/636: Performed by: EMERGENCY MEDICINE

## 2019-05-15 PROCEDURE — 96375 TX/PRO/DX INJ NEW DRUG ADDON: CPT

## 2019-05-15 PROCEDURE — 96365 THER/PROPH/DIAG IV INF INIT: CPT

## 2019-05-15 PROCEDURE — 80053 COMPREHEN METABOLIC PANEL: CPT

## 2019-05-15 PROCEDURE — 83690 ASSAY OF LIPASE: CPT

## 2019-05-15 RX ORDER — SODIUM CHLORIDE 0.9 % (FLUSH) 0.9 %
10 SYRINGE (ML) INJECTION
Status: COMPLETED | OUTPATIENT
Start: 2019-05-15 | End: 2019-05-15

## 2019-05-15 RX ORDER — DICYCLOMINE HYDROCHLORIDE 20 MG/1
20 TABLET ORAL EVERY 6 HOURS
Qty: 20 TAB | Refills: 0 | Status: SHIPPED | OUTPATIENT
Start: 2019-05-15 | End: 2019-05-20

## 2019-05-15 RX ORDER — ONDANSETRON 4 MG/1
4 TABLET, ORALLY DISINTEGRATING ORAL
Qty: 16 TAB | Refills: 0 | Status: SHIPPED | OUTPATIENT
Start: 2019-05-15

## 2019-05-15 RX ORDER — CEPHALEXIN 500 MG/1
500 CAPSULE ORAL 4 TIMES DAILY
Qty: 28 CAP | Refills: 0 | Status: SHIPPED | OUTPATIENT
Start: 2019-05-15 | End: 2019-05-22

## 2019-05-15 RX ORDER — ONDANSETRON 2 MG/ML
4 INJECTION INTRAMUSCULAR; INTRAVENOUS
Status: COMPLETED | OUTPATIENT
Start: 2019-05-15 | End: 2019-05-15

## 2019-05-15 RX ADMIN — Medication 10 ML: at 16:38

## 2019-05-15 RX ADMIN — CEFTRIAXONE 1 G: 1 INJECTION, POWDER, FOR SOLUTION INTRAMUSCULAR; INTRAVENOUS at 15:45

## 2019-05-15 RX ADMIN — IOPAMIDOL 100 ML: 755 INJECTION, SOLUTION INTRAVENOUS at 16:38

## 2019-05-15 RX ADMIN — SODIUM CHLORIDE 1000 ML: 900 INJECTION, SOLUTION INTRAVENOUS at 16:07

## 2019-05-15 RX ADMIN — ONDANSETRON 4 MG: 2 INJECTION INTRAMUSCULAR; INTRAVENOUS at 16:07

## 2019-05-15 NOTE — DISCHARGE INSTRUCTIONS
Patient Education        Abdominal Pain: Care Instructions  Your Care Instructions    Abdominal pain has many possible causes. Some aren't serious and get better on their own in a few days. Others need more testing and treatment. If your pain continues or gets worse, you need to be rechecked and may need more tests to find out what is wrong. You may need surgery to correct the problem. Don't ignore new symptoms, such as fever, nausea and vomiting, urination problems, pain that gets worse, and dizziness. These may be signs of a more serious problem. Your doctor may have recommended a follow-up visit in the next 8 to 12 hours. If you are not getting better, you may need more tests or treatment. The doctor has checked you carefully, but problems can develop later. If you notice any problems or new symptoms, get medical treatment right away. Follow-up care is a key part of your treatment and safety. Be sure to make and go to all appointments, and call your doctor if you are having problems. It's also a good idea to know your test results and keep a list of the medicines you take. How can you care for yourself at home? · Rest until you feel better. · To prevent dehydration, drink plenty of fluids, enough so that your urine is light yellow or clear like water. Choose water and other caffeine-free clear liquids until you feel better. If you have kidney, heart, or liver disease and have to limit fluids, talk with your doctor before you increase the amount of fluids you drink. · If your stomach is upset, eat mild foods, such as rice, dry toast or crackers, bananas, and applesauce. Try eating several small meals instead of two or three large ones. · Wait until 48 hours after all symptoms have gone away before you have spicy foods, alcohol, and drinks that contain caffeine. · Do not eat foods that are high in fat. · Avoid anti-inflammatory medicines such as aspirin, ibuprofen (Advil, Motrin), and naproxen (Aleve). These can cause stomach upset. Talk to your doctor if you take daily aspirin for another health problem. When should you call for help? Call 911 anytime you think you may need emergency care. For example, call if:    · You passed out (lost consciousness).     · You pass maroon or very bloody stools.     · You vomit blood or what looks like coffee grounds.     · You have new, severe belly pain.    Call your doctor now or seek immediate medical care if:    · Your pain gets worse, especially if it becomes focused in one area of your belly.     · You have a new or higher fever.     · Your stools are black and look like tar, or they have streaks of blood.     · You have unexpected vaginal bleeding.     · You have symptoms of a urinary tract infection. These may include:  ? Pain when you urinate. ? Urinating more often than usual.  ? Blood in your urine.     · You are dizzy or lightheaded, or you feel like you may faint.    Watch closely for changes in your health, and be sure to contact your doctor if:    · You are not getting better after 1 day (24 hours). Where can you learn more? Go to http://jfBidPal Networkabel.info/. Enter O709 in the search box to learn more about \"Abdominal Pain: Care Instructions. \"  Current as of: September 23, 2018  Content Version: 11.9  © 2501-9851 [employer]. Care instructions adapted under license by [employer] (which disclaims liability or warranty for this information). If you have questions about a medical condition or this instruction, always ask your healthcare professional. [place] [address] any warranty or liability for your use of this information. Patient Education        Urinary Tract Infection in Women: Care Instructions  Your Care Instructions    A urinary tract infection, or UTI, is a general term for an infection anywhere between the kidneys and the urethra (where urine comes out).  Most UTIs are bladder infections. They often cause pain or burning when you urinate. UTIs are caused by bacteria and can be cured with antibiotics. Be sure to complete your treatment so that the infection goes away. Follow-up care is a key part of your treatment and safety. Be sure to make and go to all appointments, and call your doctor if you are having problems. It's also a good idea to know your test results and keep a list of the medicines you take. How can you care for yourself at home? · Take your antibiotics as directed. Do not stop taking them just because you feel better. You need to take the full course of antibiotics. · Drink extra water and other fluids for the next day or two. This may help wash out the bacteria that are causing the infection. (If you have kidney, heart, or liver disease and have to limit fluids, talk with your doctor before you increase your fluid intake.)  · Avoid drinks that are carbonated or have caffeine. They can irritate the bladder. · Urinate often. Try to empty your bladder each time. · To relieve pain, take a hot bath or lay a heating pad set on low over your lower belly or genital area. Never go to sleep with a heating pad in place. To prevent UTIs  · Drink plenty of water each day. This helps you urinate often, which clears bacteria from your system. (If you have kidney, heart, or liver disease and have to limit fluids, talk with your doctor before you increase your fluid intake.)  · Urinate when you need to. · Urinate right after you have sex. · Change sanitary pads often. · Avoid douches, bubble baths, feminine hygiene sprays, and other feminine hygiene products that have deodorants. · After going to the bathroom, wipe from front to back. When should you call for help?   Call your doctor now or seek immediate medical care if:    · Symptoms such as fever, chills, nausea, or vomiting get worse or appear for the first time.     · You have new pain in your back just below your rib cage. This is called flank pain.     · There is new blood or pus in your urine.     · You have any problems with your antibiotic medicine.    Watch closely for changes in your health, and be sure to contact your doctor if:    · You are not getting better after taking an antibiotic for 2 days.     · Your symptoms go away but then come back. Where can you learn more? Go to http://jf-abel.info/. Enter V625 in the search box to learn more about \"Urinary Tract Infection in Women: Care Instructions. \"  Current as of: March 20, 2018  Content Version: 11.9  © 5265-7114 [employer]. Care instructions adapted under license by [employer] (which disclaims liability or warranty for this information). If you have questions about a medical condition or this instruction, always ask your healthcare professional. [address] any warranty or liability for your use of this information.

## 2019-05-15 NOTE — ED NOTES
Patient given discharge instructions by Dr Leanna Reyna. Patient was able to verbalize understanding of instructions. Questions answered  Patient ambulated out of ED in stable condition.

## 2019-05-15 NOTE — ED PROVIDER NOTES
EMERGENCY DEPARTMENT HISTORY AND PHYSICAL EXAM 
 
 
Date: 5/15/2019 Patient Name: Yash Perea History of Presenting Illness Chief Complaint Patient presents with  Abdominal Pain Patient complain of abdominal pain onset yesterday Denies any N/V/D History Provided By: Patient HPI: Yash Perea, 78 y.o. female with PMHx significant for diverticulitis, htn, gerd presents to the ED with cc of abdominal pain. Pt states her abdominal pain began around noon today. She had had lunch earlier. She states she has had associated nausea but no vomiting. She denies any urinary or bowel changes. She denies any fevers. She states her abdominal pain is in the center of her abdomen, and feels sharp in nature. No triggering factors. She states her pain is similar to when she had diverticulitis in the past. She denies any blood in her stool. There are no other complaints, changes, or physical findings at this time. PCP: Heron Travis MD 
 
No current facility-administered medications on file prior to encounter. Current Outpatient Medications on File Prior to Encounter Medication Sig Dispense Refill  acetaminophen (TYLENOL EXTRA STRENGTH) 500 mg tablet Take 1,000 mg by mouth every six (6) hours as needed for Pain.  butalbital-acetaminophen-caff (FIORICET) -40 mg per capsule Take 1 Cap by mouth every four (4) hours as needed for Pain. Max Daily Amount: 6 Caps. 10 Cap 0  
 aspirin 81 mg chewable tablet Take 81 mg by mouth daily.  predniSONE (STERAPRED DS) 10 mg dose pack As directed 21 Tab 0  
 omega-3 fatty acids-vitamin e (FISH OIL) 1,000 mg cap Take 1 Cap by mouth.  Calcium-Cholecalciferol, D3, (CALCIUM 600 WITH VITAMIN D3) 600 mg(1,500mg) -400 unit chew Take 600 mg by mouth.  timolol maleate 0.5 % DrpD ophthalmic solution Administer 1 Drop to left eye nightly.  FLUOROMETHOLONE (FML FORTE OP) Apply 1 Drop to eye daily (before breakfast).  lisinopril (PRINIVIL, ZESTRIL) 20 mg tablet Take  by mouth daily.  lovastatin (MEVACOR) 20 mg tablet Take 20 mg by mouth nightly.  citalopram (CELEXA) 20 mg tablet Take 10 mg by mouth nightly. Past History Past Medical History: 
Past Medical History:  
Diagnosis Date  Arrhythmia ? IRREGULAR HEARTBEAT  Arthritis BACK  Colon polyps Dr Codi Delgado  GERD (gastroesophageal reflux disease) OCCASIONALLY  Hypertension  Nausea & vomiting  Other ill-defined conditions(799.89) HI CHOLESTEROL  
 Other ill-defined conditions(799.89) DIVERTICULOSIS (HX: DIVERTICULITIS)  Psychiatric disorder DEPRESSION Past Surgical History: 
Past Surgical History:  
Procedure Laterality Date  ABDOMEN SURGERY PROC UNLISTED    
 LYSIS OF ADHESIONS  
 HX APPENDECTOMY  HX CATARACT REMOVAL    
 BILAT WITH IOL  HX HYSTERECTOMY  HX KNEE ARTHROSCOPY    
 RIGHT KNEE   
 HX MOHS PROCEDURES    
 RIGHT SHOULDER--ROTATOR CUFF  
 HX OOPHORECTOMY  HX ORTHOPAEDIC  6/2009 RIGHT TKR  HX TUBAL LIGATION    
 HX UROLOGICAL    
 bladder sling  CA COLONOSCOPY FLX DX W/COLLJ SPEC WHEN PFRMD  6/25/2012 Family History: 
Family History Problem Relation Age of Onset  Cancer Other   
     colon cancer  Post-op Nausea/Vomiting Sister  Breast Cancer Sister   
     over 48  Post-op Nausea/Vomiting Brother Social History: 
Social History Tobacco Use  Smoking status: Never Smoker  Smokeless tobacco: Never Used Substance Use Topics  Alcohol use: Yes Comment: VERY RARE  Drug use: No  
 
 
Allergies: Allergies Allergen Reactions  Codeine Nausea and Vomiting Review of Systems Review of Systems Constitutional: Negative for fatigue and fever. HENT: Negative. Eyes: Negative. Respiratory: Negative for shortness of breath and wheezing. Cardiovascular: Negative for chest pain and leg swelling. Gastrointestinal: Positive for abdominal pain and nausea. Negative for blood in stool, constipation, diarrhea and vomiting. Endocrine: Negative. Genitourinary: Negative for difficulty urinating and dysuria. Musculoskeletal: Negative. Skin: Negative for rash. Allergic/Immunologic: Negative. Neurological: Negative for weakness and numbness. Hematological: Negative. Psychiatric/Behavioral: Negative. Physical Exam  
Physical Exam  
Constitutional: She is oriented to person, place, and time. She appears well-developed and well-nourished. HENT:  
Head: Normocephalic and atraumatic. Mouth/Throat: Mucous membranes are normal.  
Eyes: Pupils are equal, round, and reactive to light. EOM are normal.  
Neck: Normal range of motion. No JVD present. No tracheal deviation present. Cardiovascular: Normal rate, regular rhythm, normal heart sounds and intact distal pulses. Exam reveals no gallop and no friction rub. No murmur heard. Pulmonary/Chest: Effort normal and breath sounds normal. No stridor. She has no wheezes. She has no rales. Abdominal: Soft. Bowel sounds are normal. She exhibits no distension and no mass. There is tenderness (Mild generalized abdominal tenderness). There is no guarding. Musculoskeletal: Normal range of motion. She exhibits no edema or tenderness. Neurological: She is alert and oriented to person, place, and time. Skin: Skin is warm and dry. No rash noted. Psychiatric: She has a normal mood and affect. Her behavior is normal. Judgment and thought content normal.  
 
 
Diagnostic Study Results Labs - Recent Results (from the past 12 hour(s)) URINE CULTURE HOLD SAMPLE Collection Time: 05/15/19  3:22 PM  
Result Value Ref Range Urine culture hold URINE ON HOLD IN MICROBIOLOGY DEPT FOR 3 DAYS. IF UNPRESERVED URINE IS SUBMITTED, IT CANNOT BE USED FOR ADDITIONAL TESTING AFTER 24 HRS, RECOLLECTION WILL BE REQUIRED.   
CBC WITH AUTOMATED DIFF  
 Collection Time: 05/15/19  3:23 PM  
Result Value Ref Range WBC 14.6 (H) 3.6 - 11.0 K/uL  
 RBC 4.03 3.80 - 5.20 M/uL  
 HGB 11.6 11.5 - 16.0 g/dL HCT 35.7 35.0 - 47.0 % MCV 88.6 80.0 - 99.0 FL  
 MCH 28.8 26.0 - 34.0 PG  
 MCHC 32.5 30.0 - 36.5 g/dL  
 RDW 15.4 (H) 11.5 - 14.5 % PLATELET 284 180 - 501 K/uL MPV 11.6 8.9 - 12.9 FL  
 NRBC 0.0 0  WBC ABSOLUTE NRBC 0.00 0.00 - 0.01 K/uL NEUTROPHILS 78 (H) 32 - 75 % LYMPHOCYTES 15 12 - 49 % MONOCYTES 6 5 - 13 % EOSINOPHILS 1 0 - 7 % BASOPHILS 0 0 - 1 % IMMATURE GRANULOCYTES 0 0.0 - 0.5 % ABS. NEUTROPHILS 11.4 (H) 1.8 - 8.0 K/UL  
 ABS. LYMPHOCYTES 2.2 0.8 - 3.5 K/UL  
 ABS. MONOCYTES 0.9 0.0 - 1.0 K/UL  
 ABS. EOSINOPHILS 0.2 0.0 - 0.4 K/UL  
 ABS. BASOPHILS 0.0 0.0 - 0.1 K/UL  
 ABS. IMM. GRANS. 0.1 (H) 0.00 - 0.04 K/UL  
 DF AUTOMATED Radiologic Studies -  
CT ABD PELV W CONT    (Results Pending) CT Results  (Last 48 hours) None CXR Results  (Last 48 hours) None Medical Decision Making I am the first provider for this patient. I reviewed the vital signs, available nursing notes, past medical history, past surgical history, family history and social history. Vital Signs-Reviewed the patient's vital signs. Patient Vitals for the past 12 hrs: 
 Temp Pulse Resp BP SpO2  
05/15/19 1502 98.9 °F (37.2 °C) 65 16 147/66 100 % Records Reviewed: Nursing Notes Provider Notes (Medical Decision Making):  
Patient is a 72-year-old female who presents with generalized abdominal pain and has a history of diverticulitis. Patient abdominal exam is nonacute. Will check labs ,UA, CT scan of the abdomen pelvis. Patient declines pain medications at this time. ED Course:  
Initial assessment performed.  The patients presenting problems have been discussed, and they are in agreement with the care plan formulated and outlined with them. I have encouraged them to ask questions as they arise throughout their visit. Disposition: 
Discharge home PLAN: 
1. Current Discharge Medication List  
  
 
2. Follow-up Information None Return to ED if worse Diagnosis Clinical Impression: 1. Urinary tract infection without hematuria, site unspecified 2. Abdominal pain, generalized

## 2019-06-14 ENCOUNTER — HOSPITAL ENCOUNTER (OUTPATIENT)
Dept: MAMMOGRAPHY | Age: 79
Discharge: HOME | End: 2019-06-14
Attending: FAMILY MEDICINE
Payer: MEDICARE

## 2019-06-14 DIAGNOSIS — Z12.39 BREAST SCREENING: ICD-10-CM

## 2019-06-14 PROCEDURE — 77067 SCR MAMMO BI INCL CAD: CPT

## 2019-08-30 ENCOUNTER — APPOINTMENT (OUTPATIENT)
Dept: GENERAL RADIOLOGY | Age: 79
DRG: 872 | End: 2019-08-30
Attending: EMERGENCY MEDICINE
Payer: MEDICARE

## 2019-08-30 ENCOUNTER — HOSPITAL ENCOUNTER (INPATIENT)
Age: 79
LOS: 3 days | Discharge: HOME | DRG: 872 | End: 2019-09-02
Attending: EMERGENCY MEDICINE | Admitting: INTERNAL MEDICINE
Payer: MEDICARE

## 2019-08-30 DIAGNOSIS — M25.462 KNEE EFFUSION, LEFT: ICD-10-CM

## 2019-08-30 DIAGNOSIS — A41.9 SEPSIS, DUE TO UNSPECIFIED ORGANISM: Primary | ICD-10-CM

## 2019-08-30 PROBLEM — M00.9 SEPTIC ARTHRITIS (HCC): Status: ACTIVE | Noted: 2019-08-30

## 2019-08-30 LAB
ALBUMIN SERPL-MCNC: 3.6 G/DL (ref 3.5–5)
ALBUMIN/GLOB SERPL: 0.9 {RATIO} (ref 1.1–2.2)
ALP SERPL-CCNC: 55 U/L (ref 45–117)
ALT SERPL-CCNC: 18 U/L (ref 12–78)
ANION GAP SERPL CALC-SCNC: 7 MMOL/L (ref 5–15)
AST SERPL-CCNC: 24 U/L (ref 15–37)
BASOPHILS # BLD: 0 K/UL (ref 0–0.1)
BASOPHILS NFR BLD: 0 % (ref 0–1)
BILIRUB SERPL-MCNC: 0.4 MG/DL (ref 0.2–1)
BODY FLD TYPE: NORMAL
BUN SERPL-MCNC: 20 MG/DL (ref 6–20)
BUN/CREAT SERPL: 21 (ref 12–20)
CALCIUM SERPL-MCNC: 8.6 MG/DL (ref 8.5–10.1)
CHLORIDE SERPL-SCNC: 105 MMOL/L (ref 97–108)
CO2 SERPL-SCNC: 27 MMOL/L (ref 21–32)
COMMENT, HOLDF: NORMAL
CREAT SERPL-MCNC: 0.95 MG/DL (ref 0.55–1.02)
CRP SERPL-MCNC: 6.39 MG/DL (ref 0–0.6)
CRYSTALS FLD MICRO: NORMAL
DIFFERENTIAL METHOD BLD: ABNORMAL
EOSINOPHIL # BLD: 0.1 K/UL (ref 0–0.4)
EOSINOPHIL NFR BLD: 1 % (ref 0–7)
ERYTHROCYTE [DISTWIDTH] IN BLOOD BY AUTOMATED COUNT: 15 % (ref 11.5–14.5)
ERYTHROCYTE [SEDIMENTATION RATE] IN BLOOD: 58 MM/HR (ref 0–30)
GLOBULIN SER CALC-MCNC: 4.2 G/DL (ref 2–4)
GLUCOSE SERPL-MCNC: 116 MG/DL (ref 65–100)
HCT VFR BLD AUTO: 33.7 % (ref 35–47)
HGB BLD-MCNC: 11 G/DL (ref 11.5–16)
IMM GRANULOCYTES # BLD AUTO: 0.1 K/UL (ref 0–0.04)
IMM GRANULOCYTES NFR BLD AUTO: 1 % (ref 0–0.5)
LACTATE BLD-SCNC: 1.19 MMOL/L (ref 0.4–2)
LYMPHOCYTES # BLD: 2.5 K/UL (ref 0.8–3.5)
LYMPHOCYTES NFR BLD: 18 % (ref 12–49)
MCH RBC QN AUTO: 28.4 PG (ref 26–34)
MCHC RBC AUTO-ENTMCNC: 32.6 G/DL (ref 30–36.5)
MCV RBC AUTO: 86.9 FL (ref 80–99)
MONOCYTES # BLD: 1.5 K/UL (ref 0–1)
MONOCYTES NFR BLD: 11 % (ref 5–13)
NEUTS SEG # BLD: 10.1 K/UL (ref 1.8–8)
NEUTS SEG NFR BLD: 69 % (ref 32–75)
NRBC # BLD: 0 K/UL (ref 0–0.01)
NRBC BLD-RTO: 0 PER 100 WBC
PLATELET # BLD AUTO: 279 K/UL (ref 150–400)
PMV BLD AUTO: 11.3 FL (ref 8.9–12.9)
POTASSIUM SERPL-SCNC: 3.8 MMOL/L (ref 3.5–5.1)
PROT SERPL-MCNC: 7.8 G/DL (ref 6.4–8.2)
RBC # BLD AUTO: 3.88 M/UL (ref 3.8–5.2)
SAMPLES BEING HELD,HOLD: NORMAL
SODIUM SERPL-SCNC: 139 MMOL/L (ref 136–145)
WBC # BLD AUTO: 14.4 K/UL (ref 3.6–11)

## 2019-08-30 PROCEDURE — 89060 EXAM SYNOVIAL FLUID CRYSTALS: CPT

## 2019-08-30 PROCEDURE — 83605 ASSAY OF LACTIC ACID: CPT

## 2019-08-30 PROCEDURE — 36415 COLL VENOUS BLD VENIPUNCTURE: CPT

## 2019-08-30 PROCEDURE — 74011000250 HC RX REV CODE- 250: Performed by: EMERGENCY MEDICINE

## 2019-08-30 PROCEDURE — 96375 TX/PRO/DX INJ NEW DRUG ADDON: CPT

## 2019-08-30 PROCEDURE — 96374 THER/PROPH/DIAG INJ IV PUSH: CPT

## 2019-08-30 PROCEDURE — 93005 ELECTROCARDIOGRAM TRACING: CPT

## 2019-08-30 PROCEDURE — 74011000258 HC RX REV CODE- 258: Performed by: EMERGENCY MEDICINE

## 2019-08-30 PROCEDURE — 86140 C-REACTIVE PROTEIN: CPT

## 2019-08-30 PROCEDURE — 75810000054 HC ARTHOCENTISIS JOINT

## 2019-08-30 PROCEDURE — 85025 COMPLETE CBC W/AUTO DIFF WBC: CPT

## 2019-08-30 PROCEDURE — 99285 EMERGENCY DEPT VISIT HI MDM: CPT

## 2019-08-30 PROCEDURE — 71045 X-RAY EXAM CHEST 1 VIEW: CPT

## 2019-08-30 PROCEDURE — 80053 COMPREHEN METABOLIC PANEL: CPT

## 2019-08-30 PROCEDURE — 65270000029 HC RM PRIVATE

## 2019-08-30 PROCEDURE — 96376 TX/PRO/DX INJ SAME DRUG ADON: CPT

## 2019-08-30 PROCEDURE — 84560 ASSAY OF URINE/URIC ACID: CPT

## 2019-08-30 PROCEDURE — 85652 RBC SED RATE AUTOMATED: CPT

## 2019-08-30 PROCEDURE — 0S9D3ZZ DRAINAGE OF LEFT KNEE JOINT, PERCUTANEOUS APPROACH: ICD-10-PCS | Performed by: EMERGENCY MEDICINE

## 2019-08-30 PROCEDURE — 74011250636 HC RX REV CODE- 250/636: Performed by: EMERGENCY MEDICINE

## 2019-08-30 PROCEDURE — 87040 BLOOD CULTURE FOR BACTERIA: CPT

## 2019-08-30 PROCEDURE — 82945 GLUCOSE OTHER FLUID: CPT

## 2019-08-30 PROCEDURE — 73562 X-RAY EXAM OF KNEE 3: CPT

## 2019-08-30 PROCEDURE — 87205 SMEAR GRAM STAIN: CPT

## 2019-08-30 PROCEDURE — 89050 BODY FLUID CELL COUNT: CPT

## 2019-08-30 RX ORDER — MORPHINE SULFATE 4 MG/ML
4 INJECTION INTRAVENOUS ONCE
Status: COMPLETED | OUTPATIENT
Start: 2019-08-30 | End: 2019-08-30

## 2019-08-30 RX ORDER — SODIUM CHLORIDE 0.9 % (FLUSH) 0.9 %
5-10 SYRINGE (ML) INJECTION AS NEEDED
Status: DISCONTINUED | OUTPATIENT
Start: 2019-08-30 | End: 2019-09-02 | Stop reason: HOSPADM

## 2019-08-30 RX ORDER — VANCOMYCIN HYDROCHLORIDE
1250
Status: COMPLETED | OUTPATIENT
Start: 2019-08-31 | End: 2019-08-31

## 2019-08-30 RX ORDER — METRONIDAZOLE 500 MG/1
TABLET ORAL 3 TIMES DAILY
COMMUNITY
End: 2019-09-02

## 2019-08-30 RX ORDER — ONDANSETRON 2 MG/ML
4 INJECTION INTRAMUSCULAR; INTRAVENOUS
Status: COMPLETED | OUTPATIENT
Start: 2019-08-30 | End: 2019-08-30

## 2019-08-30 RX ORDER — CIPROFLOXACIN 500 MG/1
TABLET ORAL 2 TIMES DAILY
COMMUNITY
End: 2019-09-02

## 2019-08-30 RX ORDER — DICLOFENAC SODIUM 75 MG/1
TABLET, DELAYED RELEASE ORAL
COMMUNITY
End: 2019-11-19 | Stop reason: CLARIF

## 2019-08-30 RX ADMIN — CEFTRIAXONE 1 G: 1 INJECTION, POWDER, FOR SOLUTION INTRAMUSCULAR; INTRAVENOUS at 23:40

## 2019-08-30 RX ADMIN — LIDOCAINE HYDROCHLORIDE 15 MG: 10; .005 INJECTION, SOLUTION EPIDURAL; INFILTRATION; INTRACAUDAL; PERINEURAL at 19:59

## 2019-08-30 RX ADMIN — MORPHINE SULFATE 4 MG: 4 INJECTION INTRAVENOUS at 22:57

## 2019-08-30 RX ADMIN — ONDANSETRON 4 MG: 2 INJECTION INTRAMUSCULAR; INTRAVENOUS at 19:57

## 2019-08-30 RX ADMIN — SODIUM CHLORIDE 1000 ML: 900 INJECTION, SOLUTION INTRAVENOUS at 19:51

## 2019-08-30 RX ADMIN — MORPHINE SULFATE 4 MG: 4 INJECTION INTRAVENOUS at 19:59

## 2019-08-30 NOTE — ED NOTES
Pt arrives to ED with complaints of left knee and leg pain. Pt reports that this pain has been \"going on for a while but has worsened today\". Pt reports that she is unable to bear much weight on the affected leg. Pt reports burning, sharp pain at a 10/10. Pt reports a fever of 101.9 before coming to ED. Pt reports that she take a baby aspirin daily.

## 2019-08-31 LAB
ANION GAP SERPL CALC-SCNC: 5 MMOL/L (ref 5–15)
APPEARANCE SNV: ABNORMAL
APPEARANCE UR: CLEAR
ATRIAL RATE: 92 BPM
BACTERIA URNS QL MICRO: NEGATIVE /HPF
BASOPHILS # BLD: 0 K/UL (ref 0–0.1)
BASOPHILS NFR BLD: 0 % (ref 0–1)
BILIRUB UR QL: NEGATIVE
BUN SERPL-MCNC: 19 MG/DL (ref 6–20)
BUN/CREAT SERPL: 21 (ref 12–20)
CALCIUM SERPL-MCNC: 8 MG/DL (ref 8.5–10.1)
CALCULATED P AXIS, ECG09: 46 DEGREES
CALCULATED R AXIS, ECG10: 43 DEGREES
CALCULATED T AXIS, ECG11: 44 DEGREES
CHLORIDE SERPL-SCNC: 109 MMOL/L (ref 97–108)
CO2 SERPL-SCNC: 26 MMOL/L (ref 21–32)
COLOR SNV: ABNORMAL
COLOR UR: ABNORMAL
CREAT SERPL-MCNC: 0.9 MG/DL (ref 0.55–1.02)
CRYSTALS FLD MICRO: ABNORMAL
DIAGNOSIS, 93000: NORMAL
DIFFERENTIAL METHOD BLD: ABNORMAL
EOSINOPHIL # BLD: 0.2 K/UL (ref 0–0.4)
EOSINOPHIL NFR BLD: 1 % (ref 0–7)
EPITH CASTS URNS QL MICRO: ABNORMAL /LPF
ERYTHROCYTE [DISTWIDTH] IN BLOOD BY AUTOMATED COUNT: 15.1 % (ref 11.5–14.5)
GLUCOSE FLD-MCNC: ABNORMAL MG/DL
GLUCOSE SERPL-MCNC: 88 MG/DL (ref 65–100)
GLUCOSE UR STRIP.AUTO-MCNC: NEGATIVE MG/DL
HCT VFR BLD AUTO: 29.9 % (ref 35–47)
HGB BLD-MCNC: 9.7 G/DL (ref 11.5–16)
HGB UR QL STRIP: NEGATIVE
IMM GRANULOCYTES # BLD AUTO: 0.1 K/UL (ref 0–0.04)
IMM GRANULOCYTES NFR BLD AUTO: 0 % (ref 0–0.5)
KETONES UR QL STRIP.AUTO: NEGATIVE MG/DL
LEUKOCYTE ESTERASE UR QL STRIP.AUTO: ABNORMAL
LYMPHOCYTES # BLD: 2.9 K/UL (ref 0.8–3.5)
LYMPHOCYTES NFR BLD: 24 % (ref 12–49)
LYMPHOCYTES NFR SNV MANUAL: 13 % (ref 0–74)
MCH RBC QN AUTO: 28.3 PG (ref 26–34)
MCHC RBC AUTO-ENTMCNC: 32.4 G/DL (ref 30–36.5)
MCV RBC AUTO: 87.2 FL (ref 80–99)
MONOCYTES # BLD: 1.5 K/UL (ref 0–1)
MONOCYTES NFR BLD: 13 % (ref 5–13)
MONOCYTES NFR SNV MANUAL: 3 % (ref 0–69)
NEUTROPHILS NFR SNV MANUAL: 84 % (ref 0–24)
NEUTS SEG # BLD: 7.1 K/UL (ref 1.8–8)
NEUTS SEG NFR BLD: 62 % (ref 32–75)
NITRITE UR QL STRIP.AUTO: NEGATIVE
NRBC # BLD: 0 K/UL (ref 0–0.01)
NRBC BLD-RTO: 0 PER 100 WBC
P-R INTERVAL, ECG05: 176 MS
PH UR STRIP: 5.5 [PH] (ref 5–8)
PLATELET # BLD AUTO: 236 K/UL (ref 150–400)
PMV BLD AUTO: 11.8 FL (ref 8.9–12.9)
POTASSIUM SERPL-SCNC: 3.6 MMOL/L (ref 3.5–5.1)
PROT FLD-MCNC: ABNORMAL G/DL
PROT UR STRIP-MCNC: NEGATIVE MG/DL
Q-T INTERVAL, ECG07: 354 MS
QRS DURATION, ECG06: 82 MS
QTC CALCULATION (BEZET), ECG08: 437 MS
RBC # BLD AUTO: 3.43 M/UL (ref 3.8–5.2)
RBC # SNV: >100 /CU MM
RBC #/AREA URNS HPF: ABNORMAL /HPF (ref 0–5)
SODIUM SERPL-SCNC: 140 MMOL/L (ref 136–145)
SP GR UR REFRACTOMETRY: 1.02 (ref 1–1.03)
SPECIMEN SOURCE FLD: ABNORMAL
UA: UC IF INDICATED,UAUC: ABNORMAL
URATE FLD-MCNC: ABNORMAL MG/DL
UROBILINOGEN UR QL STRIP.AUTO: 0.2 EU/DL (ref 0.2–1)
VENTRICULAR RATE, ECG03: 92 BPM
WBC # BLD AUTO: 11.8 K/UL (ref 3.6–11)
WBC # SNV: ABNORMAL /CU MM (ref 0–150)
WBC URNS QL MICRO: ABNORMAL /HPF (ref 0–4)

## 2019-08-31 PROCEDURE — 74011250636 HC RX REV CODE- 250/636: Performed by: EMERGENCY MEDICINE

## 2019-08-31 PROCEDURE — 74011000250 HC RX REV CODE- 250: Performed by: INTERNAL MEDICINE

## 2019-08-31 PROCEDURE — 97110 THERAPEUTIC EXERCISES: CPT

## 2019-08-31 PROCEDURE — 97116 GAIT TRAINING THERAPY: CPT

## 2019-08-31 PROCEDURE — 81001 URINALYSIS AUTO W/SCOPE: CPT

## 2019-08-31 PROCEDURE — 65270000029 HC RM PRIVATE

## 2019-08-31 PROCEDURE — 80048 BASIC METABOLIC PNL TOTAL CA: CPT

## 2019-08-31 PROCEDURE — 36415 COLL VENOUS BLD VENIPUNCTURE: CPT

## 2019-08-31 PROCEDURE — 74011250636 HC RX REV CODE- 250/636: Performed by: INTERNAL MEDICINE

## 2019-08-31 PROCEDURE — 87086 URINE CULTURE/COLONY COUNT: CPT

## 2019-08-31 PROCEDURE — 74011000258 HC RX REV CODE- 258: Performed by: INTERNAL MEDICINE

## 2019-08-31 PROCEDURE — 97161 PT EVAL LOW COMPLEX 20 MIN: CPT

## 2019-08-31 PROCEDURE — 74011250637 HC RX REV CODE- 250/637: Performed by: INTERNAL MEDICINE

## 2019-08-31 PROCEDURE — 85025 COMPLETE CBC W/AUTO DIFF WBC: CPT

## 2019-08-31 RX ORDER — DEXTROSE, SODIUM CHLORIDE, AND POTASSIUM CHLORIDE 5; .9; .15 G/100ML; G/100ML; G/100ML
75 INJECTION INTRAVENOUS CONTINUOUS
Status: DISCONTINUED | OUTPATIENT
Start: 2019-08-31 | End: 2019-09-02 | Stop reason: HOSPADM

## 2019-08-31 RX ORDER — ENOXAPARIN SODIUM 100 MG/ML
40 INJECTION SUBCUTANEOUS EVERY 24 HOURS
Status: DISCONTINUED | OUTPATIENT
Start: 2019-08-31 | End: 2019-09-02 | Stop reason: HOSPADM

## 2019-08-31 RX ORDER — BUTALBITAL, ACETAMINOPHEN AND CAFFEINE 50; 325; 40 MG/1; MG/1; MG/1
1 TABLET ORAL
Status: DISCONTINUED | OUTPATIENT
Start: 2019-08-31 | End: 2019-09-02 | Stop reason: HOSPADM

## 2019-08-31 RX ORDER — TIMOLOL MALEATE 5 MG/ML
1 SOLUTION/ DROPS OPHTHALMIC 2 TIMES DAILY
Status: DISCONTINUED | OUTPATIENT
Start: 2019-08-31 | End: 2019-09-02 | Stop reason: HOSPADM

## 2019-08-31 RX ORDER — ATORVASTATIN CALCIUM 10 MG/1
10 TABLET, FILM COATED ORAL
Status: DISCONTINUED | OUTPATIENT
Start: 2019-08-31 | End: 2019-09-02 | Stop reason: HOSPADM

## 2019-08-31 RX ORDER — BISACODYL 5 MG
5 TABLET, DELAYED RELEASE (ENTERIC COATED) ORAL DAILY PRN
Status: DISCONTINUED | OUTPATIENT
Start: 2019-08-31 | End: 2019-09-02 | Stop reason: HOSPADM

## 2019-08-31 RX ORDER — SODIUM CHLORIDE 0.9 % (FLUSH) 0.9 %
5-40 SYRINGE (ML) INJECTION EVERY 8 HOURS
Status: DISCONTINUED | OUTPATIENT
Start: 2019-08-31 | End: 2019-09-02 | Stop reason: HOSPADM

## 2019-08-31 RX ORDER — ACETAMINOPHEN 325 MG/1
650 TABLET ORAL
Status: DISCONTINUED | OUTPATIENT
Start: 2019-08-31 | End: 2019-09-02 | Stop reason: HOSPADM

## 2019-08-31 RX ORDER — NALOXONE HYDROCHLORIDE 0.4 MG/ML
0.4 INJECTION, SOLUTION INTRAMUSCULAR; INTRAVENOUS; SUBCUTANEOUS AS NEEDED
Status: DISCONTINUED | OUTPATIENT
Start: 2019-08-31 | End: 2019-09-02 | Stop reason: HOSPADM

## 2019-08-31 RX ORDER — FERROUS SULFATE, DRIED 160(50) MG
1 TABLET, EXTENDED RELEASE ORAL DAILY
Status: DISCONTINUED | OUTPATIENT
Start: 2019-08-31 | End: 2019-09-02 | Stop reason: HOSPADM

## 2019-08-31 RX ORDER — CITALOPRAM 20 MG/1
10 TABLET, FILM COATED ORAL
Status: DISCONTINUED | OUTPATIENT
Start: 2019-08-31 | End: 2019-09-02 | Stop reason: HOSPADM

## 2019-08-31 RX ORDER — GUAIFENESIN 100 MG/5ML
81 LIQUID (ML) ORAL DAILY
Status: DISCONTINUED | OUTPATIENT
Start: 2019-08-31 | End: 2019-09-02 | Stop reason: HOSPADM

## 2019-08-31 RX ORDER — SODIUM CHLORIDE 0.9 % (FLUSH) 0.9 %
5-40 SYRINGE (ML) INJECTION AS NEEDED
Status: DISCONTINUED | OUTPATIENT
Start: 2019-08-31 | End: 2019-09-02 | Stop reason: HOSPADM

## 2019-08-31 RX ADMIN — CITALOPRAM HYDROBROMIDE 10 MG: 20 TABLET ORAL at 21:25

## 2019-08-31 RX ADMIN — VANCOMYCIN HYDROCHLORIDE 1250 MG: 10 INJECTION, POWDER, LYOPHILIZED, FOR SOLUTION INTRAVENOUS at 00:32

## 2019-08-31 RX ADMIN — CALCIUM CARBONATE-VITAMIN D TAB 500 MG-200 UNIT 1 TABLET: 500-200 TAB at 08:29

## 2019-08-31 RX ADMIN — Medication 10 ML: at 01:25

## 2019-08-31 RX ADMIN — TIMOLOL MALEATE 1 DROP: 5 SOLUTION/ DROPS OPHTHALMIC at 10:03

## 2019-08-31 RX ADMIN — CEFTRIAXONE 1 G: 1 INJECTION, POWDER, FOR SOLUTION INTRAMUSCULAR; INTRAVENOUS at 10:08

## 2019-08-31 RX ADMIN — TIMOLOL MALEATE 1 DROP: 5 SOLUTION/ DROPS OPHTHALMIC at 17:27

## 2019-08-31 RX ADMIN — Medication 10 ML: at 21:24

## 2019-08-31 RX ADMIN — VANCOMYCIN HYDROCHLORIDE 1000 MG: 1 INJECTION, POWDER, LYOPHILIZED, FOR SOLUTION INTRAVENOUS at 17:24

## 2019-08-31 RX ADMIN — ATORVASTATIN CALCIUM 10 MG: 10 TABLET, FILM COATED ORAL at 21:25

## 2019-08-31 RX ADMIN — ACETAMINOPHEN 650 MG: 325 TABLET ORAL at 10:06

## 2019-08-31 RX ADMIN — DEXTROSE, SODIUM CHLORIDE, AND POTASSIUM CHLORIDE 75 ML/HR: 5; .9; .15 INJECTION INTRAVENOUS at 05:25

## 2019-08-31 RX ADMIN — ASPIRIN 81 MG 81 MG: 81 TABLET ORAL at 08:29

## 2019-08-31 RX ADMIN — Medication 10 ML: at 15:19

## 2019-08-31 RX ADMIN — Medication 10 ML: at 05:25

## 2019-08-31 RX ADMIN — ACETAMINOPHEN 650 MG: 325 TABLET ORAL at 17:29

## 2019-08-31 NOTE — PROGRESS NOTES
Hospitalist Progress Note    NAME: Duane Sabins   :  1940   MRN:  [id number]       Assessment / Plan:  Sepsis, secondary to left knee infection  Continue with broad spectrum abx  Antipyretics PRN  Analgesics PRN  WBC improving from 14.4 to 11.8. Tmax 102 in the past 24 hours. ESR 58, CRP 6.39  Fluid gram stain prelim negative - cx results pending  Cell count 35k, no organisms seen, negative for crystals  Ortho evals    Hypertension  Hold antihypertensive medication blood pressure on low side    Hyperlipidemia  Continue lovastatin    Depression  Continue Celexa    18.5 - 24.9 Normal weight / Body mass index is 23.49 kg/m². Code status: Full  Prophylaxis: Lovenox  Recommended Disposition:  PT, OT, RN     Subjective:     Chief Complaint / Reason for Physician Visit  Feels better since admission. Still having L knee pain when she moves it. Discussed with RN events overnight. Review of Systems:  Symptom Y/N Comments  Symptom Y/N Comments   Fever/Chills y   Chest Pain n    Poor Appetite n   Edema     Cough    Abdominal Pain n    Sputum    Joint Pain y    SOB/SANDERS n   Pruritis/Rash     Nausea/vomit n   Tolerating PT/OT     Diarrhea    Tolerating Diet     Constipation    Other       Could NOT obtain due to:      Objective:     VITALS:   Last 24hrs VS reviewed since prior progress note.  Most recent are:  Patient Vitals for the past 24 hrs:   Temp Pulse Resp BP SpO2   19 0751 98.2 °F (36.8 °C) 95 16 121/58 93 %   19 0330 98.4 °F (36.9 °C) 74 12 137/63 94 %   19 0300  (!) 57 11 100/47 95 %   19 0230  62 12 105/48 92 %   19 0100  70 12 107/41 93 %   19 0030  72 17 114/44 96 %   19 0028  76 20 118/43 95 %   19 2348  75 19 114/47 95 %   19 2315  76 14 102/41 94 %   19 2300 98.7 °F (37.1 °C) 67 17 103/42 96 %   19 2245  66 15 100/42 95 %   19 2230  67 18 104/44 95 %   19 2215  77 19 111/46 95 %   19 2200  79 17 (!) 109/38 95 %   08/30/19 2145  88 21 114/53 95 %   08/30/19 2130  80 19 106/44 96 %   08/30/19 2115  88 24 117/49 93 %   08/30/19 2100  87 18 109/50 96 %   08/30/19 2045  86 18 118/56 96 %   08/30/19 2030  92 23 113/57 93 %   08/30/19 2015  92 21 117/56 94 %   08/30/19 2000  92 28 120/61 95 %   08/30/19 1945  96 23 117/69 96 %   08/30/19 1930 (!) 102 °F (38.9 °C) 91 21 112/55 96 %   08/30/19 1915  92 21 112/55 96 %   08/30/19 1911  94 26 111/61 96 %   08/30/19 1838 (!) 101.9 °F (38.8 °C) (!) 101 16 137/65 99 %       Intake/Output Summary (Last 24 hours) at 8/31/2019 0959  Last data filed at 8/30/2019 2250  Gross per 24 hour   Intake 2983.33 ml   Output    Net 2983.33 ml        PHYSICAL EXAM:  General: WD, WN. Alert, cooperative, no acute distress    EENT:  EOMI. Anicteric sclerae. MMM  Resp:  CTA bilaterally, no wheezing or rales. No accessory muscle use  CV:  Regular  rhythm,  No edema  GI:  Soft, Non distended, Non tender.  +Bowel sounds  Neurologic:  Alert and oriented X 3, normal speech,   Psych:   Good insight. Not anxious nor agitated  Skin:  No rashes. No jaundice  MSK:  L knee without significant evidence of effusion or overlying erythema    Reviewed most current lab test results and cultures  YES  Reviewed most current radiology test results   YES  Review and summation of old records today    NO  Reviewed patient's current orders and MAR    YES  PMH/SH reviewed - no change compared to H&P  ________________________________________________________________________  Care Plan discussed with:    Comments   Patient x    Family      RN x    Care Manager     Consultant                        Multidiciplinary team rounds were held today with , nursing, pharmacist and clinical coordinator. Patient's plan of care was discussed; medications were reviewed and discharge planning was addressed.      ________________________________________________________________________  Total NON critical care TIME:  35  Minutes    Total CRITICAL CARE TIME Spent:   Minutes non procedure based      Comments   >50% of visit spent in counseling and coordination of care     ________________________________________________________________________  Noble Peña MD     Procedures: see electronic medical records for all procedures/Xrays and details which were not copied into this note but were reviewed prior to creation of Plan. LABS:  I reviewed today's most current labs and imaging studies.   Pertinent labs include:  Recent Labs     08/31/19  0510 08/30/19  1857   WBC 11.8* 14.4*   HGB 9.7* 11.0*   HCT 29.9* 33.7*    279     Recent Labs     08/31/19  0510 08/30/19  1857    139   K 3.6 3.8   * 105   CO2 26 27   GLU 88 116*   BUN 19 20   CREA 0.90 0.95   CA 8.0* 8.6   ALB  --  3.6   TBILI  --  0.4   SGOT  --  24   ALT  --  18       Signed: Noble Peña MD

## 2019-08-31 NOTE — ED PROVIDER NOTES
EMERGENCY DEPARTMENT HISTORY AND PHYSICAL EXAM      Date: 8/30/2019  Patient Name: Codey Oconnor    Please note that this dictation was completed with [employer], the computer voice recognition software. Quite often unanticipated grammatical, syntax, homophones, and other interpretive errors are inadvertently transcribed by the computer software. Please disregard these errors. Please excuse any errors that have escaped final proofreading. History of Presenting Illness     Chief Complaint   Patient presents with    Knee Pain     The patient presents to the ED with complaints of left leg pain, states that her left knee popped yesterday. Diagnosed with bursitis today at PCP.  Leg Pain       History Provided By: Patient    HPI: Codey Oconnor, 78 y.o. female, presenting the emergency department complaining of left knee pain. Pain began yesterday, was seen by PCP today and noted to have a bursitis. Pain began worsening, patient developed a fever with a T-max of 102. Pain is rated as severe, pain is worse with movement, relieved by rest..  Patient denies any shortness of breath, cough, nausea, vomiting, URI symptoms, abdominal pain, rash, urinary symptoms. PCP: Krystal Huynh MD    No current facility-administered medications on file prior to encounter. Current Outpatient Medications on File Prior to Encounter   Medication Sig Dispense Refill    diclofenac EC (VOLTAREN) 75 mg EC tablet Take  by mouth.  metroNIDAZOLE (FLAGYL) 500 mg tablet Take  by mouth three (3) times daily.  ciprofloxacin HCl (CIPRO) 500 mg tablet Take  by mouth two (2) times a day.  aspirin 81 mg chewable tablet Take 81 mg by mouth daily.  omega-3 fatty acids-vitamin e (FISH OIL) 1,000 mg cap Take 1 Cap by mouth.  Calcium-Cholecalciferol, D3, (CALCIUM 600 WITH VITAMIN D3) 600 mg(1,500mg) -400 unit chew Take 600 mg by mouth.  lisinopril (PRINIVIL, ZESTRIL) 20 mg tablet Take  by mouth daily.  lovastatin (MEVACOR) 20 mg tablet Take 20 mg by mouth nightly.  citalopram (CELEXA) 20 mg tablet Take 10 mg by mouth nightly.  ondansetron (ZOFRAN ODT) 4 mg disintegrating tablet Take 1 Tab by mouth every eight (8) hours as needed for Nausea. 16 Tab 0    acetaminophen (TYLENOL EXTRA STRENGTH) 500 mg tablet Take 1,000 mg by mouth every six (6) hours as needed for Pain.  butalbital-acetaminophen-caff (FIORICET) -40 mg per capsule Take 1 Cap by mouth every four (4) hours as needed for Pain. Max Daily Amount: 6 Caps. 10 Cap 0    predniSONE (STERAPRED DS) 10 mg dose pack As directed 21 Tab 0    timolol maleate 0.5 % DrpD ophthalmic solution Administer 1 Drop to left eye nightly.  FLUOROMETHOLONE (FML FORTE OP) Apply 1 Drop to eye daily (before breakfast). Past History     Past Medical History:  Past Medical History:   Diagnosis Date    Arrhythmia     ? Michael Alter    Colon polyps     Dr Michael Hirsch Diverticulitis     GERD (gastroesophageal reflux disease)     OCCASIONALLY    Hypertension     Nausea & vomiting     Other ill-defined conditions(799.89)     HI CHOLESTEROL    Other ill-defined conditions(799.89)     DIVERTICULOSIS (HX: DIVERTICULITIS)    Psychiatric disorder     DEPRESSION       Past Surgical History:  Past Surgical History:   Procedure Laterality Date    ABDOMEN SURGERY PROC UNLISTED      LYSIS OF ADHESIONS    HX APPENDECTOMY      HX CATARACT REMOVAL      BILAT WITH IOL    HX HYSTERECTOMY      HX KNEE ARTHROSCOPY      RIGHT KNEE     HX MOHS PROCEDURES      RIGHT SHOULDER--ROTATOR CUFF    HX OOPHORECTOMY      HX ORTHOPAEDIC  6/2009    RIGHT TKR    HX TUBAL LIGATION      HX UROLOGICAL      bladder sling    DE COLONOSCOPY FLX DX W/COLLJ SPEC WHEN PFRMD  6/25/2012            Family History:  Family History   Problem Relation Age of Onset    Cancer Other         colon cancer    Post-op Nausea/Vomiting Sister     Breast Cancer Sister         over 48    Post-op Nausea/Vomiting Brother        Social History:  Social History     Tobacco Use    Smoking status: Never Smoker    Smokeless tobacco: Never Used   Substance Use Topics    Alcohol use: Yes     Comment: VERY RARE    Drug use: No       Allergies: Allergies   Allergen Reactions    Codeine Nausea and Vomiting         Review of Systems   Review of Systems   Constitutional: Negative for chills and fever. HENT: Negative for congestion and sore throat. Eyes: Negative for visual disturbance. Respiratory: Negative for cough and shortness of breath. Cardiovascular: Negative for chest pain and leg swelling. Gastrointestinal: Negative for abdominal pain, blood in stool, diarrhea and nausea. Endocrine: Negative for polyuria. Genitourinary: Negative for dysuria, flank pain, vaginal bleeding and vaginal discharge. Musculoskeletal: Positive for arthralgias and joint swelling. Negative for myalgias. Skin: Negative for rash. Allergic/Immunologic: Negative for immunocompromised state. Neurological: Negative for weakness and headaches. Psychiatric/Behavioral: Negative for confusion. Physical Exam   Physical Exam   Constitutional: oriented to person, place, and time. appears well-developed and well-nourished. HENT:   Head: Normocephalic and atraumatic. Moist mucous membranes   Eyes: Pupils are equal, round, and reactive to light. Conjunctivae are normal. Right eye exhibits no discharge. Left eye exhibits no discharge. Neck: Normal range of motion. Neck supple. No tracheal deviation present. Cardiovascular: Normal rate, regular rhythm and normal heart sounds. No murmur heard. Pulmonary/Chest: Effort normal and breath sounds normal. No respiratory distress. no wheezes. no rales. Abdominal: Soft. Bowel sounds are normal. There is no tenderness. There is no rebound and no guarding. Musculoskeletal: There is an effusion to the left knee.   Left knee is warm to touch, exquisitely tender. Pain with range of motion. No rash. Neurological: alert and oriented to person, place, and time. Skin: Skin is warm and dry. No rash noted. No erythema. Psychiatric: behavior is normal.   Nursing note and vitals reviewed. Diagnostic Study Results     Labs -     Recent Results (from the past 12 hour(s))   CBC WITH AUTOMATED DIFF    Collection Time: 08/30/19  6:57 PM   Result Value Ref Range    WBC 14.4 (H) 3.6 - 11.0 K/uL    RBC 3.88 3.80 - 5.20 M/uL    HGB 11.0 (L) 11.5 - 16.0 g/dL    HCT 33.7 (L) 35.0 - 47.0 %    MCV 86.9 80.0 - 99.0 FL    MCH 28.4 26.0 - 34.0 PG    MCHC 32.6 30.0 - 36.5 g/dL    RDW 15.0 (H) 11.5 - 14.5 %    PLATELET 075 210 - 700 K/uL    MPV 11.3 8.9 - 12.9 FL    NRBC 0.0 0  WBC    ABSOLUTE NRBC 0.00 0.00 - 0.01 K/uL    NEUTROPHILS 69 32 - 75 %    LYMPHOCYTES 18 12 - 49 %    MONOCYTES 11 5 - 13 %    EOSINOPHILS 1 0 - 7 %    BASOPHILS 0 0 - 1 %    IMMATURE GRANULOCYTES 1 (H) 0.0 - 0.5 %    ABS. NEUTROPHILS 10.1 (H) 1.8 - 8.0 K/UL    ABS. LYMPHOCYTES 2.5 0.8 - 3.5 K/UL    ABS. MONOCYTES 1.5 (H) 0.0 - 1.0 K/UL    ABS. EOSINOPHILS 0.1 0.0 - 0.4 K/UL    ABS. BASOPHILS 0.0 0.0 - 0.1 K/UL    ABS. IMM. GRANS. 0.1 (H) 0.00 - 0.04 K/UL    DF AUTOMATED     METABOLIC PANEL, COMPREHENSIVE    Collection Time: 08/30/19  6:57 PM   Result Value Ref Range    Sodium 139 136 - 145 mmol/L    Potassium 3.8 3.5 - 5.1 mmol/L    Chloride 105 97 - 108 mmol/L    CO2 27 21 - 32 mmol/L    Anion gap 7 5 - 15 mmol/L    Glucose 116 (H) 65 - 100 mg/dL    BUN 20 6 - 20 MG/DL    Creatinine 0.95 0.55 - 1.02 MG/DL    BUN/Creatinine ratio 21 (H) 12 - 20      GFR est AA >60 >60 ml/min/1.73m2    GFR est non-AA 57 (L) >60 ml/min/1.73m2    Calcium 8.6 8.5 - 10.1 MG/DL    Bilirubin, total 0.4 0.2 - 1.0 MG/DL    ALT (SGPT) 18 12 - 78 U/L    AST (SGOT) 24 15 - 37 U/L    Alk.  phosphatase 55 45 - 117 U/L    Protein, total 7.8 6.4 - 8.2 g/dL    Albumin 3.6 3.5 - 5.0 g/dL    Globulin 4.2 (H) 2.0 - 4.0 g/dL    A-G Ratio 0.9 (L) 1.1 - 2.2     SAMPLES BEING HELD    Collection Time: 08/30/19  6:57 PM   Result Value Ref Range    SAMPLES BEING HELD RED,MAX     COMMENT        Add-on orders for these samples will be processed based on acceptable specimen integrity and analyte stability, which may vary by analyte. SED RATE (ESR)    Collection Time: 08/30/19  6:57 PM   Result Value Ref Range    Sed rate, automated 58 (H) 0 - 30 mm/hr   POC LACTIC ACID    Collection Time: 08/30/19  6:58 PM   Result Value Ref Range    Lactic Acid (POC) 1.19 0.40 - 2.00 mmol/L   EKG, 12 LEAD, INITIAL    Collection Time: 08/30/19  7:57 PM   Result Value Ref Range    Ventricular Rate 92 BPM    Atrial Rate 92 BPM    P-R Interval 176 ms    QRS Duration 82 ms    Q-T Interval 354 ms    QTC Calculation (Bezet) 437 ms    Calculated P Axis 46 degrees    Calculated R Axis 43 degrees    Calculated T Axis 44 degrees    Diagnosis       Normal sinus rhythm  Normal ECG  When compared with ECG of 09-APR-2015 15:12,  No significant change was found     SYNOVIAL FLUID 1    Collection Time: 08/30/19  9:21 PM   Result Value Ref Range    Glucose, body fld. PENDING MG/DL    Crystals, body fluid DUPLICATE REQUEST     Protein total, body fld. PENDING g/dL    Uric acid, fluid PENDING MG/DL    SYNOVIAL FLD SITE KNEE      SYN FLUID COLOR RED      SYN FLUID APPEARANCE TURBID      SYNOVIAL FLD RBC CT >100 (H) 0 /cu mm    SYNOVIAL FLD WBC CT 35,225 (H) 0 - 150 /cu mm   CRYSTALS, SYNOVIAL FLUID    Collection Time: 08/30/19  9:22 PM   Result Value Ref Range    FLUID TYPE(7) SYNOVIAL FLUID      Crystals, body fluid NO CRYSTALS SEEN WITH POLARIZED LIGHT         Radiologic Studies -   XR CHEST PORT   Final Result   IMPRESSION:   No acute process. XR KNEE LT 3 V   Final Result   IMPRESSION:       No acute fracture or dislocation. Small joint effusion.         CT Results  (Last 48 hours)    None        CXR Results  (Last 48 hours) 08/30/19 2047  XR CHEST PORT Final result    Impression:  IMPRESSION:   No acute process. Narrative:  PORTABLE CHEST RADIOGRAPH/S: 8/30/2019 8:47 PM       Clinical history: Meets sepsis criteria   INDICATION:   meets SIRS criteria   COMPARISON: 1/22/2017       FINDINGS:   AP portable upright view of the chest demonstrates a stable  cardiopericardial   silhouette. The lungs are adequately expanded. There is no edema, effusion,   consolidation, or pneumothorax. The osseous structures are unremarkable. Patient   is on a cardiac monitor. Medical Decision Making   I am the first provider for this patient. I reviewed the vital signs, available nursing notes, past medical history, past surgical history, family history and social history. Vital Signs-Reviewed the patient's vital signs. Patient Vitals for the past 12 hrs:   Temp Pulse Resp BP SpO2   08/30/19 2300 98.7 °F (37.1 °C)       08/30/19 2030  92 23 113/57 93 %   08/30/19 1930 (!) 102 °F (38.9 °C) 91 21 112/55 96 %   08/30/19 1838 (!) 101.9 °F (38.8 °C) (!) 101 16 137/65 99 %         Records Reviewed: Nursing Notes and Old Medical Records    Provider Notes (Medical Decision Making):   No evidence of other acute infectious source other than likely source of left knee. Will obtain blood cultures, chest x-ray urine culture. Will start antibiotics, aspirate synovial fluid for further testing. Disposition likely to hospitalist for further work-up, management. Orthopedic consult as indicated by synovial fluid testing    ED Course:   Initial assessment performed. The patients presenting problems have been discussed, and they are in agreement with the care plan formulated and outlined with them. I have encouraged them to ask questions as they arise throughout their visit.         Procedure Note - Arthrocentesis:     Performed by Joseph Lucas    Immediately prior to the procedure, the patient was reevaluated and found suitable for the planned procedure and any planned medications. Immediately prior to the procedure a time out was called to verify the correct patient, procedure, equipment, staff, and marking as appropriate. Indication for procedure: Unexplained joint effusion  Approach: medial  The site prepped with ChloraPrep. Sterile field established. Anesthesia was obtained with 5 mLs of Lidocaine 1% without epinephrine. knee joint was entered, using a 18 gauge needle, and 30 cc's of straw colored/turbid fluid was withdrawn and sent for aerobic culture, anaerobic culture, cell count & diff, crystal analysis and gram stain. The procedure took 1-15 minutes, and pt tolerated well. Critical Care Time:   none    Disposition:  Patient is being admitted to the hospital by Dr. Lexie Koch. The results of their tests and reasons for their admission have been discussed with them and/or available family. They convey agreement and understanding for the need to be admitted and for their admission diagnosis. Consultation has been made with the inpatient physician specialist for hospitalization. Diagnosis     Clinical Impression:   1. Sepsis, due to unspecified organism ([address]    2. Knee effusion, left        Attestations:   This note was completed by Reynaldo Resendez DO

## 2019-08-31 NOTE — PROGRESS NOTES
Patient was admitted from the ED. Swelling and redness to left knee. No c/o pain. Purwick in place. SCDs in place.

## 2019-08-31 NOTE — PROGRESS NOTES
Bedside and Verbal shift change report given to Lázaro Erazo RN (oncoming nurse) by [facility], RN (offgoing nurse). Report included the following information SBAR, Kardex, ED Summary, [facility] and Recent Results.

## 2019-08-31 NOTE — PROGRESS NOTES
TRANSFER - IN REPORT:    Verbal report received from Ashanti Blanco RN(name) on [employer]  being received from ED(unit) for routine progression of care      Report consisted of patients Situation, Background, Assessment and   Recommendations(SBAR). Information from the following report(s) SBAR, Kardex, Intake/Output, MAR and Recent Results was reviewed with the receiving nurse. Opportunity for questions and clarification was provided. Assessment completed upon patients arrival to unit and care assumed.

## 2019-08-31 NOTE — ED NOTES
Verbal shift change report given to Vannesa Arenas RN  (oncoming nurse) by Shalonda Limon (offgoing nurse). Report included the following information SBAR, Kardex, ED Summary, [facility] and Recent Results.

## 2019-08-31 NOTE — H&P
Hospitalist Admission Note    NAME: Heather Mclaughlin   :  [birth year]   MRN:  [id number]     Date/Time:  2019 11:47 PM    Patient PCP: Becky Doan MD  ______________________________________________________________________  Given the patient's current clinical presentation, I have a high level of concern for decompensation if discharged from the emergency department. Complex decision making was performed, which includes reviewing the patient's available past medical records, laboratory results, and x-ray films. My assessment of this patient's clinical condition and my plan of care is as follows.     Assessment / Plan:    Sepsis  Most likely secondary to left knee septic arthritis  Admit patient to Ortho unit  Orthopedic consultation  Start patient on broad-spectrum antibiotic Vanco and ceftriaxone  Follow-up blood culture    Hypertension  Hold antihypertensive medication blood pressure on low side    Hyperlipidemia  Continue lovastatin    Depression  Continue Celexa      Code Status: Full   Surrogate Decision Maker:Froilan Dinero    DVT Prophylaxis: Lovenox   GI Prophylaxis: not indicated          Subjective:   CHIEF COMPLAINT: LF knee pain     HISTORY OF PRESENT ILLNESS:       78years old female from home with past medical history significant for hypertension, hyperlipidemia, depression, GERD, arthritis presented to the hospital for evaluation of left knee pain on swelling, patient stated she been complaining from mild left knee pain for couple weeks and since Thursday the pain or swelling worsening patient also complaining from fever or chills, patient denies any trauma any injury, blood work was significant for elevated white blood cell count 14.4, knee x-ray was done and show no acute fracture or dislocation small joint effusion status post joint fluid aspiration done by ED physician which show elevated white blood cell count 35,225, orthopedic was consulted by ED physician  We were asked to admit for work up and evaluation of the above problems. Past Medical History:   Diagnosis Date    Arrhythmia     ? Myrick Merlin    Colon polyps     Dr Niya Mukherjee Diverticulitis     GERD (gastroesophageal reflux disease)     OCCASIONALLY    Hypertension     Nausea & vomiting     Other ill-defined conditions(799.89)     HI CHOLESTEROL    Other ill-defined conditions(799.89)     DIVERTICULOSIS (HX: DIVERTICULITIS)    Psychiatric disorder     DEPRESSION        Past Surgical History:   Procedure Laterality Date    ABDOMEN SURGERY PROC UNLISTED      LYSIS OF ADHESIONS    HX APPENDECTOMY      HX CATARACT REMOVAL      BILAT WITH IOL    HX HYSTERECTOMY      HX KNEE ARTHROSCOPY      RIGHT KNEE     HX MOHS PROCEDURES      RIGHT SHOULDER--ROTATOR CUFF    HX OOPHORECTOMY      HX ORTHOPAEDIC  6/2009    RIGHT TKR    HX TUBAL LIGATION      HX UROLOGICAL      bladder sling    NE COLONOSCOPY FLX DX W/COLLJ SPEC WHEN PFRMD  6/25/2012            Social History     Tobacco Use    Smoking status: Never Smoker    Smokeless tobacco: Never Used   Substance Use Topics    Alcohol use: Yes     Comment: VERY RARE        Family History   Problem Relation Age of Onset    Cancer Other         colon cancer    Post-op Nausea/Vomiting Sister     Breast Cancer Sister         over 48    Post-op Nausea/Vomiting Brother      Allergies   Allergen Reactions    Codeine Nausea and Vomiting        Prior to Admission medications    Medication Sig Start Date End Date Taking? Authorizing Provider   diclofenac EC (VOLTAREN) 75 mg EC tablet Take  by mouth. Yes Elba, MD Declan   metroNIDAZOLE (FLAGYL) 500 mg tablet Take  by mouth three (3) times daily. Yes Declan Arreola MD   ciprofloxacin HCl (CIPRO) 500 mg tablet Take  by mouth two (2) times a day. Yes Elba, MD Declan   aspirin 81 mg chewable tablet Take 81 mg by mouth daily.    Yes Declan Arreola MD   omega-3 fatty acids-vitamin e (FISH OIL) 1,000 mg cap Take 1 Cap by mouth. Yes Other, MD Declan   Calcium-Cholecalciferol, D3, (CALCIUM 600 WITH VITAMIN D3) 600 mg(1,500mg) -400 unit chew Take 600 mg by mouth. Yes Other, MD Declan   lisinopril (PRINIVIL, ZESTRIL) 20 mg tablet Take  by mouth daily. Yes Provider, Historical   lovastatin (MEVACOR) 20 mg tablet Take 20 mg by mouth nightly. Yes Provider, Historical   citalopram (CELEXA) 20 mg tablet Take 10 mg by mouth nightly. 3/18/11  Yes Provider, Historical   ondansetron (ZOFRAN ODT) 4 mg disintegrating tablet Take 1 Tab by mouth every eight (8) hours as needed for Nausea. 5/15/19   [facility], DO   acetaminophen (TYLENOL EXTRA STRENGTH) 500 mg tablet Take 1,000 mg by mouth every six (6) hours as needed for Pain. Other, MD Declan   butalbital-acetaminophen-caff (FIORICET) -40 mg per capsule Take 1 Cap by mouth every four (4) hours as needed for Pain. Max Daily Amount: 6 Caps. 10/5/17   Elvis Crenshaw MD   [facility]) 10 mg dose pack As directed 1/22/17   Wilmer Tineo MD   timolol maleate 0.5 % DrpD ophthalmic solution Administer 1 Drop to left eye nightly. 3/18/11   Provider, Historical   FLUOROMETHOLONE (FML FORTE OP) Apply 1 Drop to eye daily (before breakfast). 3/18/11   Provider, Historical       REVIEW OF SYSTEMS:     I am not able to complete the review of systems because:    The patient is intubated and sedated    The patient has altered mental status due to his acute medical problems    The patient has baseline aphasia from prior stroke(s)    The patient has baseline dementia and is not reliable historian    The patient is in acute medical distress and unable to provide information           Total of 12 systems reviewed as follows:       POSITIVE= underlined text  Negative = text not underlined  General:  fever, chills, sweats, generalized weakness, weight loss/gain,      loss of appetite   Eyes:    blurred vision, eye pain, loss of vision, double vision  ENT:    rhinorrhea, pharyngitis   Respiratory:   cough, sputum production, SOB, SANDERS, wheezing, pleuritic pain   Cardiology:   chest pain, palpitations, orthopnea, PND, edema, syncope   Gastrointestinal:  abdominal pain , N/V, diarrhea, dysphagia, constipation, bleeding   Genitourinary:  frequency, urgency, dysuria, hematuria, incontinence   Muskuloskeletal :  arthralgia, myalgia, back pain  Hematology:  easy bruising, nose or gum bleeding, lymphadenopathy   Dermatological: rash, ulceration, pruritis, color change / jaundice  Endocrine:   hot flashes or polydipsia   Neurological:  headache, dizziness, confusion, focal weakness, paresthesia,     Speech difficulties, memory loss, gait difficulty  Psychological: Feelings of anxiety, depression, agitation    Objective:   VITALS:    Visit Vitals  /41   Pulse 76   Temp 98.7 °F (37.1 °C)   Resp 14   Ht 5' 7\" (1.702 m)   Wt 68 kg (150 lb)   SpO2 94%   BMI 23.49 kg/m²       PHYSICAL EXAM:    General:    Alert, cooperative, no distress, appears stated age. HEENT: Atraumatic, anicteric sclerae, pink conjunctivae     No oral ulcers, mucosa moist, throat clear, dentition fair  Neck:  Supple, symmetrical,  thyroid: non tender  Lungs:   Clear to auscultation bilaterally. No Wheezing or Rhonchi. No rales. Chest wall:  No tenderness  No Accessory muscle use. Heart:   Regular  rhythm,  No  murmur   No edema  Abdomen:   Soft, non-tender. Not distended. Bowel sounds normal  Extremities: No cyanosis. No clubbing,   LF knee pain and swelling     Skin turgor normal, Capillary refill normal, Radial dial pulse 2+  Skin:     Not pale. Not Jaundiced  No rashes   Psych:  Good insight. Not depressed. Not anxious or agitated. Neurologic: EOMs intact. No facial asymmetry. No aphasia or slurred speech. Symmetrical strength, Sensation grossly intact. Alert and oriented X 4. _______________________________________________________________________  Care Plan discussed with:    Comments   Patient y    Family      RN y    Care Manager                    Consultant:      _______________________________________________________________________  Expected  Disposition:   Home with Family y   HH/PT/OT/RN    SNF/LTC    JESSICA    ________________________________________________________________________  TOTAL TIME:  72  Minutes    Critical Care Provided     Minutes non procedure based      Comments    y Reviewed previous records   >50% of visit spent in counseling and coordination of care y Discussion with patient and/or family and questions answered       ________________________________________________________________________  Signed: Mague Crane MD    Procedures: see electronic medical records for all procedures/Xrays and details which were not copied into this note but were reviewed prior to creation of Plan. LAB DATA REVIEWED:    Recent Results (from the past 24 hour(s))   CBC WITH AUTOMATED DIFF    Collection Time: 08/30/19  6:57 PM   Result Value Ref Range    WBC 14.4 (H) 3.6 - 11.0 K/uL    RBC 3.88 3.80 - 5.20 M/uL    HGB 11.0 (L) 11.5 - 16.0 g/dL    HCT 33.7 (L) 35.0 - 47.0 %    MCV 86.9 80.0 - 99.0 FL    MCH 28.4 26.0 - 34.0 PG    MCHC 32.6 30.0 - 36.5 g/dL    RDW 15.0 (H) 11.5 - 14.5 %    PLATELET 538 251 - 228 K/uL    MPV 11.3 8.9 - 12.9 FL    NRBC 0.0 0  WBC    ABSOLUTE NRBC 0.00 0.00 - 0.01 K/uL    NEUTROPHILS 69 32 - 75 %    LYMPHOCYTES 18 12 - 49 %    MONOCYTES 11 5 - 13 %    EOSINOPHILS 1 0 - 7 %    BASOPHILS 0 0 - 1 %    IMMATURE GRANULOCYTES 1 (H) 0.0 - 0.5 %    ABS. NEUTROPHILS 10.1 (H) 1.8 - 8.0 K/UL    ABS. LYMPHOCYTES 2.5 0.8 - 3.5 K/UL    ABS. MONOCYTES 1.5 (H) 0.0 - 1.0 K/UL    ABS. EOSINOPHILS 0.1 0.0 - 0.4 K/UL    ABS. BASOPHILS 0.0 0.0 - 0.1 K/UL    ABS. IMM.  GRANS. 0.1 (H) 0.00 - 0.04 K/UL    DF AUTOMATED     METABOLIC PANEL, COMPREHENSIVE    Collection Time: 08/30/19  6:57 PM   Result Value Ref Range    Sodium 139 136 - 145 mmol/L    Potassium 3.8 3.5 - 5.1 mmol/L    Chloride 105 97 - 108 mmol/L    CO2 27 21 - 32 mmol/L    Anion gap 7 5 - 15 mmol/L    Glucose 116 (H) 65 - 100 mg/dL    BUN 20 6 - 20 MG/DL    Creatinine 0.95 0.55 - 1.02 MG/DL    BUN/Creatinine ratio 21 (H) 12 - 20      GFR est AA >60 >60 ml/min/1.73m2    GFR est non-AA 57 (L) >60 ml/min/1.73m2    Calcium 8.6 8.5 - 10.1 MG/DL    Bilirubin, total 0.4 0.2 - 1.0 MG/DL    ALT (SGPT) 18 12 - 78 U/L    AST (SGOT) 24 15 - 37 U/L    Alk. phosphatase 55 45 - 117 U/L    Protein, total 7.8 6.4 - 8.2 g/dL    Albumin 3.6 3.5 - 5.0 g/dL    Globulin 4.2 (H) 2.0 - 4.0 g/dL    A-G Ratio 0.9 (L) 1.1 - 2.2     SAMPLES BEING HELD    Collection Time: 08/30/19  6:57 PM   Result Value Ref Range    SAMPLES BEING HELD RED,MAX     COMMENT        Add-on orders for these samples will be processed based on acceptable specimen integrity and analyte stability, which may vary by analyte. C REACTIVE PROTEIN, QT    Collection Time: 08/30/19  6:57 PM   Result Value Ref Range    C-Reactive protein 6.39 (H) 0.00 - 0.60 mg/dL   SED RATE (ESR)    Collection Time: 08/30/19  6:57 PM   Result Value Ref Range    Sed rate, automated 58 (H) 0 - 30 mm/hr   POC LACTIC ACID    Collection Time: 08/30/19  6:58 PM   Result Value Ref Range    Lactic Acid (POC) 1.19 0.40 - 2.00 mmol/L   EKG, 12 LEAD, INITIAL    Collection Time: 08/30/19  7:57 PM   Result Value Ref Range    Ventricular Rate 92 BPM    Atrial Rate 92 BPM    P-R Interval 176 ms    QRS Duration 82 ms    Q-T Interval 354 ms    QTC Calculation (Bezet) 437 ms    Calculated P Axis 46 degrees    Calculated R Axis 43 degrees    Calculated T Axis 44 degrees    Diagnosis       Normal sinus rhythm  Normal ECG  When compared with ECG of 09-APR-2015 15:12,  No significant change was found     SYNOVIAL FLUID 1    Collection Time: 08/30/19  9:21 PM   Result Value Ref Range    Glucose, body fld. PENDING MG/DL    Crystals, body fluid DUPLICATE REQUEST     Protein total, body fld.  PENDING g/dL    Uric acid, fluid PENDING MG/DL    SYNOVIAL FLD SITE KNEE      SYN FLUID COLOR RED      SYN FLUID APPEARANCE TURBID      SYNOVIAL FLD RBC CT >100 (H) 0 /cu mm    SYNOVIAL FLD WBC CT 35,225 (H) 0 - 150 /cu mm    SYNOVIAL FLD SEGS 84 (H) 0 - 24 %    SYNOVIAL FLD LYMPHS 13 0 - 74 %    SYNOVIAL FLD MONOS 3 0 - 69 %   CRYSTALS, SYNOVIAL FLUID    Collection Time: 08/30/19  9:22 PM   Result Value Ref Range    FLUID TYPE(7) SYNOVIAL FLUID      Crystals, body fluid NO CRYSTALS SEEN WITH POLARIZED LIGHT

## 2019-08-31 NOTE — PROGRESS NOTES
Orthopedic End of Shift Note    Bedside and Verbal shift change report given to Alejandra Laughlin (oncoming nurse) by Sim Moritz (offgoing nurse). Report included the following information SBAR, Kardex, Intake/Output, MAR and Recent Results. POD#     Significant issues during shift: Patient was admitted from the ED. Swelling and redness to left knee. No c/o pain. Purwick in place. SCDs in place. Issues for Physician to address:Patient was admitted from the ED. Swelling and redness to left knee. No c/o pain. Purwick in place. SCDs in place.      Activity This Shift  (check all that apply) [] chair  [] dangle   [] bathroom  [] bedside commode [] hallway  [x] bedrest   Nausea/Vomiting [] yes [x] no     Voiding Status [x] void [] Martins [] I&O Cath   Bowel Movements [] yes [x] no     Foot Pumps or SCD [x] yes [] no    Ice Pack [] yes    [x] no    Incentive Spirometer [] yes [] no Volume:      Telemetry Monitoring   [] yes [x] no Rhythm:   Supplemental O2 [] yes [x] no Sat off O2:  93 %

## 2019-08-31 NOTE — CONSULTS
ORTHOPAEDIC CONSULT NOTE    Subjective:     Date of Consultation:  August 31, 2019      Anibal Mcmahon is a 78 y.o. female who is being seen for left pain and swelling. Pt reports mild left knee pain for \"a couple weeks\", acutely worse since Thursday. Seen by PCP- Dx w bursitis. Notes fever with a T-max of 102. Ambulates at baseline unassisted. Pt. Denies trauma/injury. Patient Active Problem List    Diagnosis Date Noted    Septic arthritis ([address] 08/30/2019    Rectocele 03/29/2011     Family History   Problem Relation Age of Onset    Cancer Other         colon cancer    Post-op Nausea/Vomiting Sister     Breast Cancer Sister         over 48    Post-op Nausea/Vomiting Brother       Social History     Tobacco Use    Smoking status: Never Smoker    Smokeless tobacco: Never Used   Substance Use Topics    Alcohol use: Yes     Comment: VERY RARE     Past Medical History:   Diagnosis Date    Arrhythmia     ? Areli Solorio    Colon polyps     Dr Iggy Shine Diverticulitis     GERD (gastroesophageal reflux disease)     OCCASIONALLY    Hypertension     Nausea & vomiting     Other ill-defined conditions(799.89)     HI CHOLESTEROL    Other ill-defined conditions(799.89)     DIVERTICULOSIS (HX: DIVERTICULITIS)    Psychiatric disorder     DEPRESSION      Past Surgical History:   Procedure Laterality Date    ABDOMEN SURGERY PROC UNLISTED      LYSIS OF ADHESIONS    HX APPENDECTOMY      HX CATARACT REMOVAL      BILAT WITH IOL    HX HYSTERECTOMY      HX KNEE ARTHROSCOPY      RIGHT KNEE     HX MOHS PROCEDURES      RIGHT SHOULDER--ROTATOR CUFF    HX OOPHORECTOMY      HX ORTHOPAEDIC  6/2009    RIGHT TKR    HX TUBAL LIGATION      HX UROLOGICAL      bladder sling    TN COLONOSCOPY FLX DX W/COLLJ SPEC WHEN PFRMD  6/25/2012           Prior to Admission medications    Medication Sig Start Date End Date Taking?  Authorizing Provider   diclofenac EC (VOLTAREN) 75 mg EC tablet Take  by mouth. Yes Other, MD Declan   metroNIDAZOLE (FLAGYL) 500 mg tablet Take  by mouth three (3) times daily. Yes Declan Arreola MD   ciprofloxacin HCl (CIPRO) 500 mg tablet Take  by mouth two (2) times a day. Yes Other, MD Declan   aspirin 81 mg chewable tablet Take 81 mg by mouth daily. Yes Other, MD Declan   omega-3 fatty acids-vitamin e (FISH OIL) 1,000 mg cap Take 1 Cap by mouth. Yes Other, MD Declan   Calcium-Cholecalciferol, D3, (CALCIUM 600 WITH VITAMIN D3) 600 mg(1,500mg) -400 unit chew Take 600 mg by mouth. Yes Elba, MD Declan   lisinopril (PRINIVIL, ZESTRIL) 20 mg tablet Take  by mouth daily. Yes Provider, Historical   lovastatin (MEVACOR) 20 mg tablet Take 20 mg by mouth nightly. Yes Provider, Historical   citalopram (CELEXA) 20 mg tablet Take 10 mg by mouth nightly. 3/18/11  Yes Provider, Historical   ondansetron (ZOFRAN ODT) 4 mg disintegrating tablet Take 1 Tab by mouth every eight (8) hours as needed for Nausea. 5/15/19   Jestine Milady, DO   acetaminophen (TYLENOL EXTRA STRENGTH) 500 mg tablet Take 1,000 mg by mouth every six (6) hours as needed for Pain. Other, MD Declan   butalbital-acetaminophen-caff (FIORICET) -40 mg per capsule Take 1 Cap by mouth every four (4) hours as needed for Pain. Max Daily Amount: 6 Caps. 10/5/17   Alberto Nunn MD   [facility] DS) 10 mg dose pack As directed 1/22/17   Celeste Putnam MD   timolol maleate 0.5 % DrpD ophthalmic solution Administer 1 Drop to left eye nightly. 3/18/11   Provider, Historical   FLUOROMETHOLONE (FML FORTE OP) Apply 1 Drop to eye daily (before breakfast).  3/18/11   Provider, Historical     Current Facility-Administered Medications   Medication Dose Route Frequency    sodium chloride (NS) flush 5-10 mL  5-10 mL IntraVENous PRN    vancomycin (VANCOCIN) 1250 mg in  ml infusion  1,250 mg IntraVENous NOW     Current Outpatient Medications   Medication Sig    diclofenac EC (VOLTAREN) 75 mg EC tablet Take  by mouth.  metroNIDAZOLE (FLAGYL) 500 mg tablet Take  by mouth three (3) times daily.  ciprofloxacin HCl (CIPRO) 500 mg tablet Take  by mouth two (2) times a day.  aspirin 81 mg chewable tablet Take 81 mg by mouth daily.  omega-3 fatty acids-vitamin e (FISH OIL) 1,000 mg cap Take 1 Cap by mouth.  Calcium-Cholecalciferol, D3, (CALCIUM 600 WITH VITAMIN D3) 600 mg(1,500mg) -400 unit chew Take 600 mg by mouth.  lisinopril (PRINIVIL, ZESTRIL) 20 mg tablet Take  by mouth daily.  lovastatin (MEVACOR) 20 mg tablet Take 20 mg by mouth nightly.  citalopram (CELEXA) 20 mg tablet Take 10 mg by mouth nightly.  ondansetron (ZOFRAN ODT) 4 mg disintegrating tablet Take 1 Tab by mouth every eight (8) hours as needed for Nausea.  acetaminophen (TYLENOL EXTRA STRENGTH) 500 mg tablet Take 1,000 mg by mouth every six (6) hours as needed for Pain.  butalbital-acetaminophen-caff (FIORICET) -40 mg per capsule Take 1 Cap by mouth every four (4) hours as needed for Pain. Max Daily Amount: 6 Caps.  predniSONE (STERAPRED DS) 10 mg dose pack As directed    timolol maleate 0.5 % DrpD ophthalmic solution Administer 1 Drop to left eye nightly.  FLUOROMETHOLONE (FML FORTE OP) Apply 1 Drop to eye daily (before breakfast). Allergies   Allergen Reactions    Codeine Nausea and Vomiting        Review of Systems:  A comprehensive review of systems was negative except for that written in the HPI.     Mental Status: no dementia    Objective:     Patient Vitals for the past 8 hrs:   BP Temp Pulse Resp SpO2 Height Weight   08/31/19 0030 114/44  72 17 96 %     08/31/19 0028 118/43  76 20 95 %     08/30/19 2348 114/47  75 19 95 %     08/30/19 2328      5' 7\" (1.702 m) 68 kg (150 lb)   08/30/19 2315 102/41  76 14 94 %     08/30/19 2300 103/42 98.7 °F (37.1 °C) 67 17 96 %     08/30/19 2245 100/42  66 15 95 %     08/30/19 2230 104/44  67 18 95 %     08/30/19 2215 111/46  77 19 95 %     19 2200 (!) 109/38  79 17 95 %     19 214 114/53  88 21 95 %     190 106/44  80 19 96 %     195 117/49  88 24 93 %     19 109/50  87 18 96 %     19 118/56  86 18 96 %     19 113/57  92 23 93 %     19 117/56  92 21 94 %     19 120/61  92 28 95 %     19 194 117/69  96 23 96 %     19 193 112/55 (!) 102 °F (38.9 °C) 91 21 96 %     19 191 112/55  92 21 96 %     19 191 111/61  94 26 96 %     19 1838 137/65 (!) 101.9 °F (38.8 °C) (!) 101 16 99 %       Temp (24hrs), Av.9 °F (38.3 °C), Min:98.7 °F (37.1 °C), Max:102 °F (38.9 °C)      Gen: Well-developed,  in no acute distress   HEENT: Pink conjunctivae, hearing intact to voice, moist mucous membranes   Neck: Supple  Resp: No respiratory distress   Card: RRR, palpable distal pulse-equal bilaterally, birsk cap refill all distal digits   Abd: non-distended  Musc: Left knee with trace effusion, NO bursitis, guarded ROM 10-50, No sig erythema, minimal global tenderness. intact ext mech, no collateral instability, No sig of LE VTE. Skin: No skin breakdown noted. Skin warm, pink, dry-  Well healed scar from prior R-TKA  Neuro: Cranial nerves are grossly intact, no focal motor weakness, follows commands appropriately   Psych: Good insight, oriented to person, place and time, alert    Imaging Review: XR KNEE LT 3 V  Clinical history: Possible joint infection  INDICATION: joint infection.  COMPARISON: None.   FINDINGS: Three views of the left knee demonstrate no fracture or other acute  osseous or articular abnormality. There is a small joint effusion. Lateral  compartment joint space narrowing more than medial compartment. .   IMPRESSION:   No acute fracture or dislocation. Small joint effusion.     Labs:   Recent Results (from the past 24 hour(s))   CBC WITH AUTOMATED DIFF    Collection Time: 19 6:57 PM   Result Value Ref Range    WBC 14.4 (H) 3.6 - 11.0 K/uL    RBC 3.88 3.80 - 5.20 M/uL    HGB 11.0 (L) 11.5 - 16.0 g/dL    HCT 33.7 (L) 35.0 - 47.0 %    MCV 86.9 80.0 - 99.0 FL    MCH 28.4 26.0 - 34.0 PG    MCHC 32.6 30.0 - 36.5 g/dL    RDW 15.0 (H) 11.5 - 14.5 %    PLATELET 086 287 - 394 K/uL    MPV 11.3 8.9 - 12.9 FL    NRBC 0.0 0  WBC    ABSOLUTE NRBC 0.00 0.00 - 0.01 K/uL    NEUTROPHILS 69 32 - 75 %    LYMPHOCYTES 18 12 - 49 %    MONOCYTES 11 5 - 13 %    EOSINOPHILS 1 0 - 7 %    BASOPHILS 0 0 - 1 %    IMMATURE GRANULOCYTES 1 (H) 0.0 - 0.5 %    ABS. NEUTROPHILS 10.1 (H) 1.8 - 8.0 K/UL    ABS. LYMPHOCYTES 2.5 0.8 - 3.5 K/UL    ABS. MONOCYTES 1.5 (H) 0.0 - 1.0 K/UL    ABS. EOSINOPHILS 0.1 0.0 - 0.4 K/UL    ABS. BASOPHILS 0.0 0.0 - 0.1 K/UL    ABS. IMM. GRANS. 0.1 (H) 0.00 - 0.04 K/UL    DF AUTOMATED     METABOLIC PANEL, COMPREHENSIVE    Collection Time: 08/30/19  6:57 PM   Result Value Ref Range    Sodium 139 136 - 145 mmol/L    Potassium 3.8 3.5 - 5.1 mmol/L    Chloride 105 97 - 108 mmol/L    CO2 27 21 - 32 mmol/L    Anion gap 7 5 - 15 mmol/L    Glucose 116 (H) 65 - 100 mg/dL    BUN 20 6 - 20 MG/DL    Creatinine 0.95 0.55 - 1.02 MG/DL    BUN/Creatinine ratio 21 (H) 12 - 20      GFR est AA >60 >60 ml/min/1.73m2    GFR est non-AA 57 (L) >60 ml/min/1.73m2    Calcium 8.6 8.5 - 10.1 MG/DL    Bilirubin, total 0.4 0.2 - 1.0 MG/DL    ALT (SGPT) 18 12 - 78 U/L    AST (SGOT) 24 15 - 37 U/L    Alk. phosphatase 55 45 - 117 U/L    Protein, total 7.8 6.4 - 8.2 g/dL    Albumin 3.6 3.5 - 5.0 g/dL    Globulin 4.2 (H) 2.0 - 4.0 g/dL    A-G Ratio 0.9 (L) 1.1 - 2.2     SAMPLES BEING HELD    Collection Time: 08/30/19  6:57 PM   Result Value Ref Range    SAMPLES BEING HELD RED,MAX     COMMENT        Add-on orders for these samples will be processed based on acceptable specimen integrity and analyte stability, which may vary by analyte.    C REACTIVE PROTEIN, QT    Collection Time: 08/30/19  6:57 PM   Result Value Ref Range C-Reactive protein 6.39 (H) 0.00 - 0.60 mg/dL   SED RATE (ESR)    Collection Time: 08/30/19  6:57 PM   Result Value Ref Range    Sed rate, automated 58 (H) 0 - 30 mm/hr   POC LACTIC ACID    Collection Time: 08/30/19  6:58 PM   Result Value Ref Range    Lactic Acid (POC) 1.19 0.40 - 2.00 mmol/L   EKG, 12 LEAD, INITIAL    Collection Time: 08/30/19  7:57 PM   Result Value Ref Range    Ventricular Rate 92 BPM    Atrial Rate 92 BPM    P-R Interval 176 ms    QRS Duration 82 ms    Q-T Interval 354 ms    QTC Calculation (Bezet) 437 ms    Calculated P Axis 46 degrees    Calculated R Axis 43 degrees    Calculated T Axis 44 degrees    Diagnosis       Normal sinus rhythm  Normal ECG  When compared with ECG of 09-APR-2015 15:12,  No significant change was found     SYNOVIAL FLUID 1    Collection Time: 08/30/19  9:21 PM   Result Value Ref Range    Glucose, body fld. PENDING MG/DL    Crystals, body fluid DUPLICATE REQUEST     Protein total, body fld.  PENDING g/dL    Uric acid, fluid PENDING MG/DL    SYNOVIAL FLD SITE KNEE      SYN FLUID COLOR RED      SYN FLUID APPEARANCE TURBID      SYNOVIAL FLD RBC CT >100 (H) 0 /cu mm    SYNOVIAL FLD WBC CT 35,225 (H) 0 - 150 /cu mm    SYNOVIAL FLD SEGS 84 (H) 0 - 24 %    SYNOVIAL FLD LYMPHS 13 0 - 74 %    SYNOVIAL FLD MONOS 3 0 - 69 %   CRYSTALS, SYNOVIAL FLUID    Collection Time: 08/30/19  9:22 PM   Result Value Ref Range    FLUID TYPE(7) SYNOVIAL FLUID      Crystals, body fluid NO CRYSTALS SEEN WITH POLARIZED LIGHT     CULTURE, BODY FLUID W GRAM STAIN    Collection Time: 08/30/19  9:23 PM   Result Value Ref Range    Special Requests: NO SPECIAL REQUESTS      GRAM STAIN 2+ WBCS SEEN      GRAM STAIN NO ORGANISMS SEEN      Culture result: PENDING    URINALYSIS W/ REFLEX CULTURE    Collection Time: 08/31/19 12:21 AM   Result Value Ref Range    Color YELLOW/STRAW      Appearance CLEAR CLEAR      Specific gravity 1.017 1.003 - 1.030      pH (UA) 5.5 5.0 - 8.0      Protein NEGATIVE  NEG mg/dL Glucose NEGATIVE  NEG mg/dL    Ketone NEGATIVE  NEG mg/dL    Bilirubin NEGATIVE  NEG      Blood NEGATIVE  NEG      Urobilinogen 0.2 0.2 - 1.0 EU/dL    Nitrites NEGATIVE  NEG      Leukocyte Esterase MODERATE (A) NEG      WBC PENDING /hpf    RBC PENDING /hpf    Epithelial cells PENDING /lpf    Bacteria PENDING /hpf    UA:UC IF INDICATED PENDING          Impression:     Patient Active Problem List    Diagnosis Date Noted    Septic arthritis ([facility] 08/30/2019    Rectocele 03/29/2011     Active Problems:    Septic arthritis ([facility] [address] (8/30/2019)        Plan:       Left knee - likely inflammatory synovitis/djd  Possible septic joint-- although clinical picture unimpressive  Cell count 35k(84%segs), No organisms, negative crystals  Sed rate 58  CRP- 6.39  WBC 14.4  Cultures pending   Will Follow, NPO after midnight       Dr. Kiki Hargrove aware and agrees with plan as above.         Trevor Ricks PA-C  [employer]

## 2019-08-31 NOTE — PROGRESS NOTES
Problem: Mobility Impaired (Adult and Pediatric)  Goal: *Therapy Goal (Edit Goal, Insert Text)  Description  FUNCTIONAL STATUS PRIOR TO ADMISSION: Patient was independent and active without use of DME.    HOME SUPPORT PRIOR TO ADMISSION: The patient lived with spouse but did not require assist.    Physical Therapy Goals  Initiated 8/31/2019  1. Patient will move from supine to sit and sit to supine  in bed with independence within 7 day(s). 2.  Patient will transfer from bed to chair and chair to bed with independence using the least restrictive device within 7 day(s). 3.  Patient will perform sit to stand with independence within 7 day(s). 4.  Patient will ambulate with independence for 150 feet with the least restrictive device within 7 day(s). 5.  Patient will ascend/descend 2 stairs with 1 handrail(s) with minimal assistance/contact guard assist within 7 day(s). 6. Patient will be independent with LE (knee) exercise home ex program within 7 days. Outcome: Progressing Towards Goal   PHYSICAL THERAPY EVALUATION  Patient: Nathen Khan (18 y.o. female)  Date: 8/31/2019  Primary Diagnosis: Septic arthritis ([facility] [address] [M00.9]        Precautions: fall         ASSESSMENT  Based on the objective data described below, the patient presents with painful L knee and limited mobility due to the pain. Patient has had progressing pian L knee for a few days and then yesterday got to 10/10 pain & pt was unable to put any weight on it. Pt was admitted with high WBC count and dx of septic knee. Pt evaluated this AM, and now pain is 5/10, less hot, and less swollen. MD in today who indicated no growth of bacteria on culture and since pt can tolerate PROM and AROM - (though not full ROM) it is most likely not a septic knee. Pt ambulates with RW or cane - prefers RW for now, but doubt pt will need RW for long and has her husbands cane.   Pt should do well with PT and may need HHPT if pain and mobility cont to be compromised. Current Level of Function Impacting Discharge (mobility/balance): pain in knee interfering with ambulation distance/quality    Functional Outcome Measure: The patient scored 12 on the MEMS outcome measure which is indicative of borderline independence. .      Other factors to consider for discharge: none or possible HHPT     Patient will benefit from skilled therapy intervention to address the above noted impairments. PLAN :  Recommendations and Planned Interventions: bed mobility training, transfer training, gait training, therapeutic exercises, patient and family training/education and therapeutic activities      Frequency/Duration: Patient will be followed by physical therapy:  3 times a week to address goals. Recommendation for discharge: (in order for the patient to meet his/her long term goals)  Physical therapy at least 2 days/week in the home     This discharge recommendation:  Has not yet been discussed the attending provider and/or case management    Equipment recommendations for successful discharge (if) home: patient owns DME required for discharge         SUBJECTIVE:   Patient stated It is doing better.     OBJECTIVE DATA SUMMARY:   HISTORY:    Past Medical History:   Diagnosis Date    Arrhythmia     ? Ivy Martinez    Colon polyps     Dr Sadia Hi    Diverticulitis     GERD (gastroesophageal reflux disease)     OCCASIONALLY    Hypertension     Nausea & vomiting     Other ill-defined conditions(799.89)     HI CHOLESTEROL    Other ill-defined conditions(799.89)     DIVERTICULOSIS (HX: DIVERTICULITIS)    Psychiatric disorder     DEPRESSION     Past Surgical History:   Procedure Laterality Date    ABDOMEN SURGERY PROC UNLISTED      LYSIS OF ADHESIONS    HX APPENDECTOMY      HX CATARACT REMOVAL      BILAT WITH IOL    HX HYSTERECTOMY      HX KNEE ARTHROSCOPY      RIGHT KNEE     HX MOHS PROCEDURES      RIGHT SHOULDER--ROTATOR CUFF    HX OOPHORECTOMY      HX ORTHOPAEDIC  6/2009    RIGHT TKR    HX TUBAL LIGATION      HX UROLOGICAL      bladder sling    AR COLONOSCOPY FLX DX W/COLLJ SPEC WHEN PFRMD  6/25/2012            Personal factors and/or comorbidities impacting plan of care: none, flat affect    Home Situation  Home Environment: Private residence  # Steps to Enter: 2  Rails to Enter: Yes  Living Alone: No  Support Systems: Family member(s)  Patient Expects to be Discharged to[de-identified] Private residence    EXAMINATION/PRESENTATION/DECISION MAKING:   Critical Behavior:  Neurologic State: Alert  Orientation Level: Oriented X4        Hearing: Auditory  Auditory Impairment: None  Skin:  warm to touch L knee  Edema: +1 knee  Range Of Motion:  AROM: Within functional limits x L knee is 75% normal ROM, limited by pain                       Strength:    Strength: Within functional limits                    Tone & Sensation:   Tone: Normal              Sensation: Intact               Coordination:  Coordination: Within functional limits  Vision:      Functional Mobility:  Bed Mobility:  Rolling: Stand-by assistance  Supine to Sit: Stand-by assistance        Transfers:  Sit to Stand: Contact guard assistance  Stand to Sit: Contact guard assistance                       Balance:   Sitting: Intact  Standing: Impaired  Standing - Static: Good  Standing - Dynamic : Fair(only limping some with pain L)  Ambulation/Gait Training:  Distance (ft): 50 Feet (ft)  Assistive Device: Walker, rolling  Ambulation - Level of Assistance: Contact guard assistance     Gait Description (WDL): Exceptions to WDL  Gait Abnormalities: Decreased step clearance  Right Side Weight Bearing: Full  Left Side Weight Bearing: As tolerated  Base of Support: Widened;Shift to right     Speed/Renata: Pace decreased (<100 feet/min)  Step Length: Left shortened                     Stairs:               Therapeutic Exercises:   Instructed pt to MOVE knee as able with no precautions from MD.  Heel slides in chair and bed x 10 3x/day  Ankle pumps/circles  Knee extensions working towards full extension x 10 3x/day  Quad sets x 30 per day      Functional Measure:    Elder Mobility Scale    12/20         Scores under 10 - generally these patients are dependent in mobility maneuvers; require help with  basic ADL, such as transfers, toileting and dressing. Scores between 10 - 13 - generally these patients are borderline in terms of safe mobility and  independence in ADL i.e. they require some help with some mobility maneuvers. Scores over 14 - Generally these patients are able to perform mobility maneuvers alone and safely  and are independent in basic ADL. Physical Therapy Evaluation Charge Determination   History Examination Presentation Decision-Making   LOW Complexity : Zero comorbidities / personal factors that will impact the outcome / POC LOW Complexity : 1-2 Standardized tests and measures addressing body structure, function, activity limitation and / or participation in recreation  LOW Complexity : Stable, uncomplicated  LOW Complexity : FOTO score of       Based on the above components, the patient evaluation is determined to be of the following complexity level: LOW     Pain Ratin, L knee    Activity Tolerance:   Good  Please refer to the flowsheet for vital signs taken during this treatment. After treatment patient left in no apparent distress:   Sitting in chair    COMMUNICATION/EDUCATION:   The patients plan of care was discussed with: Registered Nurse. Patient/family have participated as able in goal setting and plan of care.     Thank you for this referral.  Belinda Hendrix, PT   Time Calculation: 45 mins

## 2019-09-01 LAB
ANION GAP SERPL CALC-SCNC: 5 MMOL/L (ref 5–15)
BACTERIA SPEC CULT: NORMAL
BASOPHILS # BLD: 0 K/UL (ref 0–0.1)
BASOPHILS NFR BLD: 0 % (ref 0–1)
BUN SERPL-MCNC: 12 MG/DL (ref 6–20)
BUN/CREAT SERPL: 15 (ref 12–20)
CALCIUM SERPL-MCNC: 8.4 MG/DL (ref 8.5–10.1)
CC UR VC: NORMAL
CHLORIDE SERPL-SCNC: 108 MMOL/L (ref 97–108)
CO2 SERPL-SCNC: 26 MMOL/L (ref 21–32)
CREAT SERPL-MCNC: 0.78 MG/DL (ref 0.55–1.02)
DIFFERENTIAL METHOD BLD: ABNORMAL
EOSINOPHIL # BLD: 0.2 K/UL (ref 0–0.4)
EOSINOPHIL NFR BLD: 2 % (ref 0–7)
ERYTHROCYTE [DISTWIDTH] IN BLOOD BY AUTOMATED COUNT: 14.9 % (ref 11.5–14.5)
GLUCOSE SERPL-MCNC: 98 MG/DL (ref 65–100)
HCT VFR BLD AUTO: 31.7 % (ref 35–47)
HGB BLD-MCNC: 10.1 G/DL (ref 11.5–16)
IMM GRANULOCYTES # BLD AUTO: 0.1 K/UL (ref 0–0.04)
IMM GRANULOCYTES NFR BLD AUTO: 0 % (ref 0–0.5)
LYMPHOCYTES # BLD: 2.2 K/UL (ref 0.8–3.5)
LYMPHOCYTES NFR BLD: 19 % (ref 12–49)
MAGNESIUM SERPL-MCNC: 1.8 MG/DL (ref 1.6–2.4)
MCH RBC QN AUTO: 27.6 PG (ref 26–34)
MCHC RBC AUTO-ENTMCNC: 31.9 G/DL (ref 30–36.5)
MCV RBC AUTO: 86.6 FL (ref 80–99)
MONOCYTES # BLD: 1.2 K/UL (ref 0–1)
MONOCYTES NFR BLD: 11 % (ref 5–13)
NEUTS SEG # BLD: 7.7 K/UL (ref 1.8–8)
NEUTS SEG NFR BLD: 68 % (ref 32–75)
NRBC # BLD: 0 K/UL (ref 0–0.01)
NRBC BLD-RTO: 0 PER 100 WBC
PHOSPHATE SERPL-MCNC: 2.6 MG/DL (ref 2.6–4.7)
PLATELET # BLD AUTO: 238 K/UL (ref 150–400)
PMV BLD AUTO: 11.2 FL (ref 8.9–12.9)
POTASSIUM SERPL-SCNC: 3.9 MMOL/L (ref 3.5–5.1)
RBC # BLD AUTO: 3.66 M/UL (ref 3.8–5.2)
SERVICE CMNT-IMP: NORMAL
SODIUM SERPL-SCNC: 139 MMOL/L (ref 136–145)
WBC # BLD AUTO: 11.3 K/UL (ref 3.6–11)

## 2019-09-01 PROCEDURE — 85025 COMPLETE CBC W/AUTO DIFF WBC: CPT

## 2019-09-01 PROCEDURE — 65270000029 HC RM PRIVATE

## 2019-09-01 PROCEDURE — 80048 BASIC METABOLIC PNL TOTAL CA: CPT

## 2019-09-01 PROCEDURE — 84100 ASSAY OF PHOSPHORUS: CPT

## 2019-09-01 PROCEDURE — 36415 COLL VENOUS BLD VENIPUNCTURE: CPT

## 2019-09-01 PROCEDURE — 83735 ASSAY OF MAGNESIUM: CPT

## 2019-09-01 PROCEDURE — 74011250637 HC RX REV CODE- 250/637: Performed by: INTERNAL MEDICINE

## 2019-09-01 PROCEDURE — 74011250636 HC RX REV CODE- 250/636: Performed by: INTERNAL MEDICINE

## 2019-09-01 PROCEDURE — 74011000258 HC RX REV CODE- 258: Performed by: INTERNAL MEDICINE

## 2019-09-01 PROCEDURE — 74011250636 HC RX REV CODE- 250/636: Performed by: PHYSICIAN ASSISTANT

## 2019-09-01 RX ORDER — KETOROLAC TROMETHAMINE 30 MG/ML
15 INJECTION, SOLUTION INTRAMUSCULAR; INTRAVENOUS
Status: DISCONTINUED | OUTPATIENT
Start: 2019-09-01 | End: 2019-09-02 | Stop reason: HOSPADM

## 2019-09-01 RX ORDER — FLUOROMETHOLONE 1 MG/ML
1 SOLUTION/ DROPS OPHTHALMIC
Status: DISCONTINUED | OUTPATIENT
Start: 2019-09-01 | End: 2019-09-02 | Stop reason: HOSPADM

## 2019-09-01 RX ADMIN — Medication 10 ML: at 21:22

## 2019-09-01 RX ADMIN — ASPIRIN 81 MG 81 MG: 81 TABLET ORAL at 10:36

## 2019-09-01 RX ADMIN — KETOROLAC TROMETHAMINE 15 MG: 30 INJECTION, SOLUTION INTRAMUSCULAR at 12:59

## 2019-09-01 RX ADMIN — ENOXAPARIN SODIUM 40 MG: 40 INJECTION SUBCUTANEOUS at 10:36

## 2019-09-01 RX ADMIN — CITALOPRAM HYDROBROMIDE 10 MG: 20 TABLET ORAL at 21:21

## 2019-09-01 RX ADMIN — ATORVASTATIN CALCIUM 10 MG: 10 TABLET, FILM COATED ORAL at 21:22

## 2019-09-01 RX ADMIN — DEXTROSE, SODIUM CHLORIDE, AND POTASSIUM CHLORIDE 75 ML/HR: 5; .9; .15 INJECTION INTRAVENOUS at 17:27

## 2019-09-01 RX ADMIN — Medication 10 ML: at 14:19

## 2019-09-01 RX ADMIN — TIMOLOL MALEATE 1 DROP: 5 SOLUTION/ DROPS OPHTHALMIC at 10:39

## 2019-09-01 RX ADMIN — VANCOMYCIN HYDROCHLORIDE 1000 MG: 1 INJECTION, POWDER, LYOPHILIZED, FOR SOLUTION INTRAVENOUS at 10:38

## 2019-09-01 RX ADMIN — CALCIUM CARBONATE-VITAMIN D TAB 500 MG-200 UNIT 1 TABLET: 500-200 TAB at 10:36

## 2019-09-01 RX ADMIN — CEFTRIAXONE 1 G: 1 INJECTION, POWDER, FOR SOLUTION INTRAMUSCULAR; INTRAVENOUS at 12:28

## 2019-09-01 RX ADMIN — ACETAMINOPHEN 650 MG: 325 TABLET ORAL at 20:25

## 2019-09-01 NOTE — PROGRESS NOTES
Bedside shift change report given to Bashir Saeed RN (oncoming nurse) by Magen Painter RN (offgoing nurse). Report included the following information SBAR, Kardex, OR Summary, Procedure Summary, Intake/Output, MAR, Accordion, Recent Results and Med Rec Status.

## 2019-09-01 NOTE — PROGRESS NOTES
Hospitalist Progress Note    NAME: Bashir Arrington   :  1940   MRN:  [id number]       Assessment / Plan:  Sepsis, secondary to left knee infection  Continue with broad spectrum abx  Antipyretics PRN  Analgesics PRN  WBC improving from 14.4 to 11.8. Tmax 102 in the past 24 hours. ESR 58, CRP 6.39  Fluid gram stain prelim negative - cx results pending  Cell count 35k, no organisms seen, negative for crystals  Ortho evals    :  Afebrile past 24 hours, WBC 11.3 which is improving. Continue following Cx results   Ortho eval for today pending    Hypertension  Hold antihypertensive medication blood pressure on low side    Hyperlipidemia  Continue lovastatin    Depression  Continue Celexa    18.5 - 24.9 Normal weight / Body mass index is 23.49 kg/m². Code status: Full  Prophylaxis: Lovenox  Recommended Disposition:  PT, OT, RN     Subjective:     Chief Complaint / Reason for Physician Visit  L knee pain improved but still present. Discussed with RN events overnight. Review of Systems:  Symptom Y/N Comments  Symptom Y/N Comments   Fever/Chills y   Chest Pain n    Poor Appetite n   Edema     Cough    Abdominal Pain n    Sputum    Joint Pain y    SOB/SANDERS n   Pruritis/Rash     Nausea/vomit n   Tolerating PT/OT     Diarrhea    Tolerating Diet     Constipation    Other       Could NOT obtain due to:      Objective:     VITALS:   Last 24hrs VS reviewed since prior progress note. Most recent are:  Patient Vitals for the past 24 hrs:   Temp Pulse Resp BP SpO2   19 0805 98.8 °F (37.1 °C) 95 16 136/52 98 %   19 0349 98.5 °F (36.9 °C) 87 18 138/50 96 %   19 0013 99.4 °F (37.4 °C) 78 18 132/43 96 %   19 2003 99.9 °F (37.7 °C) 79 18 128/46 94 %   19 1625 98.1 °F (36.7 °C) 90 18 118/62 95 %   19 1156 98.2 °F (36.8 °C) 97 16 122/51 97 %     No intake or output data in the 24 hours ending 19 0923     PHYSICAL EXAM:  General: WD, WN.  Alert, cooperative, no acute distress    EENT:  EOMI. Anicteric sclerae. MMM  Resp:  CTA bilaterally, no wheezing or rales. No accessory muscle use  CV:  Regular  rhythm,  No edema  GI:  Soft, Non distended, Non tender.  +Bowel sounds  Neurologic:  Alert and oriented X 3, normal speech,   Psych:   Good insight. Not anxious nor agitated  Skin:  No rashes. No jaundice  MSK:  L knee without significant evidence of effusion or overlying erythema    Reviewed most current lab test results and cultures  YES  Reviewed most current radiology test results   YES  Review and summation of old records today    NO  Reviewed patient's current orders and MAR    YES  PMH/SH reviewed - no change compared to H&P  ________________________________________________________________________  Care Plan discussed with:    Comments   Patient x    Family      RN x    Care Manager     Consultant                        Multidiciplinary team rounds were held today with , nursing, pharmacist and clinical coordinator. Patient's plan of care was discussed; medications were reviewed and discharge planning was addressed. ________________________________________________________________________  Total NON critical care TIME:  25 Minutes    Total CRITICAL CARE TIME Spent:   Minutes non procedure based      Comments   >50% of visit spent in counseling and coordination of care     ________________________________________________________________________  Jessenia Gutierrez MD     Procedures: see electronic medical records for all procedures/Xrays and details which were not copied into this note but were reviewed prior to creation of Plan. LABS:  I reviewed today's most current labs and imaging studies.   Pertinent labs include:  Recent Labs     09/01/19  0356 08/31/19  0510 08/30/19  1857   WBC 11.3* 11.8* 14.4*   HGB 10.1* 9.7* 11.0*   HCT 31.7* 29.9* 33.7*    236 279     Recent Labs     09/01/19  0356 08/31/19  0510 08/30/19  1857    140 139   K 3.9 3.6 3.8    109* 105   CO2 26 26 27   GLU 98 88 116*   BUN 12 19 20   CREA 0.78 0.90 0.95   CA 8.4* 8.0* 8.6   MG 1.8  --   --    PHOS 2.6  --   --    ALB  --   --  3.6   TBILI  --   --  0.4   SGOT  --   --  24   ALT  --   --  18       Signed: Jonathan Prince MD

## 2019-09-01 NOTE — PROGRESS NOTES
cx remain neg, pain improved.   Has taken toradol in the past without issue    Patient Vitals for the past 24 hrs:   Temp Pulse Resp BP SpO2   09/01/19 0805 98.8 °F (37.1 °C) 95 16 136/52 98 %   09/01/19 0349 98.5 °F (36.9 °C) 87 18 138/50 96 %   09/01/19 0013 99.4 °F (37.4 °C) 78 18 132/43 96 %   08/31/19 2003 99.9 °F (37.7 °C) 79 18 128/46 94 %   08/31/19 1625 98.1 °F (36.7 °C) 90 18 118/62 95 %   08/31/19 1156 98.2 °F (36.8 °C) 97 16 122/51 97 %     Knee small effusion  Benign    Will add some toradol and   Ok to DC from ortho standpoint

## 2019-09-01 NOTE — PROGRESS NOTES
Orthopedic End of Shift Note    Bedside and Verbal shift change report given to Gallito Art RN (oncoming nurse) by Tamika Acosta RN (offgoing nurse). Report included the following information SBAR, Kardex, Procedure Summary, Intake/Output, MAR and Recent Results.

## 2019-09-02 VITALS
DIASTOLIC BLOOD PRESSURE: 58 MMHG | TEMPERATURE: 99.1 F | SYSTOLIC BLOOD PRESSURE: 160 MMHG | WEIGHT: 150 LBS | HEIGHT: 67 IN | HEART RATE: 74 BPM | BODY MASS INDEX: 23.54 KG/M2 | RESPIRATION RATE: 16 BRPM | OXYGEN SATURATION: 98 %

## 2019-09-02 LAB
ANION GAP SERPL CALC-SCNC: 7 MMOL/L (ref 5–15)
BASOPHILS # BLD: 0 K/UL (ref 0–0.1)
BASOPHILS NFR BLD: 0 % (ref 0–1)
BUN SERPL-MCNC: 14 MG/DL (ref 6–20)
BUN/CREAT SERPL: 16 (ref 12–20)
CALCIUM SERPL-MCNC: 7.9 MG/DL (ref 8.5–10.1)
CHLORIDE SERPL-SCNC: 111 MMOL/L (ref 97–108)
CO2 SERPL-SCNC: 24 MMOL/L (ref 21–32)
CREAT SERPL-MCNC: 0.89 MG/DL (ref 0.55–1.02)
DATE LAST DOSE: ABNORMAL
DIFFERENTIAL METHOD BLD: ABNORMAL
EOSINOPHIL # BLD: 0.2 K/UL (ref 0–0.4)
EOSINOPHIL NFR BLD: 3 % (ref 0–7)
ERYTHROCYTE [DISTWIDTH] IN BLOOD BY AUTOMATED COUNT: 14.7 % (ref 11.5–14.5)
GLUCOSE SERPL-MCNC: 107 MG/DL (ref 65–100)
HCT VFR BLD AUTO: 27.1 % (ref 35–47)
HGB BLD-MCNC: 8.9 G/DL (ref 11.5–16)
IMM GRANULOCYTES # BLD AUTO: 0 K/UL (ref 0–0.04)
IMM GRANULOCYTES NFR BLD AUTO: 0 % (ref 0–0.5)
LYMPHOCYTES # BLD: 2.2 K/UL (ref 0.8–3.5)
LYMPHOCYTES NFR BLD: 24 % (ref 12–49)
MCH RBC QN AUTO: 28.2 PG (ref 26–34)
MCHC RBC AUTO-ENTMCNC: 32.8 G/DL (ref 30–36.5)
MCV RBC AUTO: 85.8 FL (ref 80–99)
MONOCYTES # BLD: 1.1 K/UL (ref 0–1)
MONOCYTES NFR BLD: 11 % (ref 5–13)
NEUTS SEG # BLD: 5.8 K/UL (ref 1.8–8)
NEUTS SEG NFR BLD: 62 % (ref 32–75)
NRBC # BLD: 0 K/UL (ref 0–0.01)
NRBC BLD-RTO: 0 PER 100 WBC
PLATELET # BLD AUTO: 215 K/UL (ref 150–400)
PMV BLD AUTO: 11.4 FL (ref 8.9–12.9)
POTASSIUM SERPL-SCNC: 3.9 MMOL/L (ref 3.5–5.1)
RBC # BLD AUTO: 3.16 M/UL (ref 3.8–5.2)
REPORTED DOSE,DOSE: ABNORMAL UNITS
REPORTED DOSE/TIME,TMG: ABNORMAL
SODIUM SERPL-SCNC: 142 MMOL/L (ref 136–145)
VANCOMYCIN TROUGH SERPL-MCNC: 10.5 UG/ML (ref 5–10)
WBC # BLD AUTO: 9.4 K/UL (ref 3.6–11)

## 2019-09-02 PROCEDURE — 80202 ASSAY OF VANCOMYCIN: CPT

## 2019-09-02 PROCEDURE — 74011250637 HC RX REV CODE- 250/637: Performed by: INTERNAL MEDICINE

## 2019-09-02 PROCEDURE — 85025 COMPLETE CBC W/AUTO DIFF WBC: CPT

## 2019-09-02 PROCEDURE — 74011000250 HC RX REV CODE- 250: Performed by: GENERAL ACUTE CARE HOSPITAL

## 2019-09-02 PROCEDURE — 36415 COLL VENOUS BLD VENIPUNCTURE: CPT

## 2019-09-02 PROCEDURE — 74011250636 HC RX REV CODE- 250/636: Performed by: INTERNAL MEDICINE

## 2019-09-02 PROCEDURE — 80048 BASIC METABOLIC PNL TOTAL CA: CPT

## 2019-09-02 RX ORDER — TRAMADOL HYDROCHLORIDE 50 MG/1
50 TABLET ORAL
Qty: 28 TAB | Refills: 0 | Status: SHIPPED | OUTPATIENT
Start: 2019-09-02 | End: 2019-09-09

## 2019-09-02 RX ADMIN — ASPIRIN 81 MG 81 MG: 81 TABLET ORAL at 09:23

## 2019-09-02 RX ADMIN — CALCIUM CARBONATE-VITAMIN D TAB 500 MG-200 UNIT 1 TABLET: 500-200 TAB at 09:23

## 2019-09-02 RX ADMIN — FLUOROMETHOLONE 1 DROP: 1 SOLUTION/ DROPS OPHTHALMIC at 06:51

## 2019-09-02 RX ADMIN — ENOXAPARIN SODIUM 40 MG: 40 INJECTION SUBCUTANEOUS at 09:23

## 2019-09-02 RX ADMIN — TIMOLOL MALEATE 1 DROP: 5 SOLUTION/ DROPS OPHTHALMIC at 06:59

## 2019-09-02 RX ADMIN — Medication 10 ML: at 05:59

## 2019-09-02 RX ADMIN — VANCOMYCIN HYDROCHLORIDE 1000 MG: 1 INJECTION, POWDER, LYOPHILIZED, FOR SOLUTION INTRAVENOUS at 00:17

## 2019-09-02 NOTE — PROGRESS NOTES
I have reviewed discharge instructions with the patient. The patient verbalized understanding. Discussed with the patient and all questioned fully answered. She will call PCP if any problems arise. IV removed per policy. Patient transported home by family.

## 2019-09-02 NOTE — DISCHARGE SUMMARY
Hospitalist Discharge Summary     Patient ID:  Christian Posadas  [id number]  80 y.o.  1940 8/30/2019    PCP on record: Ugo Edmonds MD    Admit date: 8/30/2019  Discharge date and time: 9/2/2019    DISCHARGE DIAGNOSIS:  See below    CONSULTATIONS:  IP CONSULT TO ORTHOPEDIC SURGERY    Excerpted HPI from H&P of Laurel Martinez MD:  78years old female from home with past medical history significant for hypertension, hyperlipidemia, depression, GERD, arthritis presented to the hospital for evaluation of left knee pain on swelling, patient stated she been complaining from mild left knee pain for couple weeks and since Thursday the pain or swelling worsening patient also complaining from fever or chills, patient denies any trauma any injury, blood work was significant for elevated white blood cell count 14.4, knee x-ray was done and show no acute fracture or dislocation small joint effusion status post joint fluid aspiration done by ED physician which show elevated white blood cell count 35,225, orthopedic was consulted by ED physician  We were asked to admit for work up and evaluation of the above problems. ______________________________________________________________________  DISCHARGE SUMMARY/HOSPITAL COURSE:  for full details see H&P, daily progress notes, labs, consult notes. Sepsis, secondary to left knee infection - resolved - likely from inflammatory arthritis  Continue with broad spectrum abx  Antipyretics PRN  Analgesics PRN  WBC improving from 14.4 to 11.8. Tmax 102 in the past 24 hours. ESR 58, CRP 6.39  Fluid gram stain prelim negative - cx results pending  Cell count 35k, no organisms seen, negative for crystals  Ortho evals    9/1:  Afebrile past 24 hours, WBC 11.3 which is improving. Continue following Cx results   Ortho eval for today pending    9/2:  Cx thus far negative. Pt remains afebrile. WBC improved.   Ortho cleared for discharge, referred to the effusion as \"benign. \"  Will dc on analgesics PRN. Hypertension  -Resume home BP meds    Hyperlipidemia  Continue lovastatin    Depression  Continue Celexa    _______________________________________________________________________  Patient seen and examined by me on discharge day. Pertinent Findings:  Gen:    Not in distress  Chest: Clear lungs  CVS:   Regular rhythm. No edema  Abd:  Soft, not distended, not tender  Neuro:  Alert, oriented x4  MSK: L knee without effusion, full ROM, no erythema or warmth appreciated  _______________________________________________________________________  DISCHARGE MEDICATIONS:   Current Discharge Medication List      START taking these medications    Details   traMADol (ULTRAM) 50 mg tablet Take 1 Tab by mouth every six (6) hours as needed for Pain for up to 7 days. Max Daily Amount: 200 mg. Qty: 28 Tab, Refills: 0    Associated Diagnoses: Knee effusion, left         CONTINUE these medications which have NOT CHANGED    Details   diclofenac EC (VOLTAREN) 75 mg EC tablet Take  by mouth. aspirin 81 mg chewable tablet Take 81 mg by mouth daily. omega-3 fatty acids-vitamin e (FISH OIL) 1,000 mg cap Take 1 Cap by mouth. Calcium-Cholecalciferol, D3, (CALCIUM 600 WITH VITAMIN D3) 600 mg(1,500mg) -400 unit chew Take 600 mg by mouth.      lisinopril (PRINIVIL, ZESTRIL) 20 mg tablet Take  by mouth daily. lovastatin (MEVACOR) 20 mg tablet Take 20 mg by mouth nightly. citalopram (CELEXA) 20 mg tablet Take 10 mg by mouth nightly. ondansetron (ZOFRAN ODT) 4 mg disintegrating tablet Take 1 Tab by mouth every eight (8) hours as needed for Nausea. Qty: 16 Tab, Refills: 0      acetaminophen (TYLENOL EXTRA STRENGTH) 500 mg tablet Take 1,000 mg by mouth every six (6) hours as needed for Pain. butalbital-acetaminophen-caff (FIORICET) -40 mg per capsule Take 1 Cap by mouth every four (4) hours as needed for Pain. Max Daily Amount: 6 Caps.   Qty: 10 Cap, Refills: 0 timolol maleate 0.5 % DrpD ophthalmic solution Administer 1 Drop to left eye nightly. FLUOROMETHOLONE (FML FORTE OP) Apply 1 Drop to eye daily (before breakfast). STOP taking these medications       metroNIDAZOLE (FLAGYL) 500 mg tablet Comments:   Reason for Stopping:         ciprofloxacin HCl (CIPRO) 500 mg tablet Comments:   Reason for Stopping:         predniSONE (STERAPRED DS) 10 mg dose pack Comments:   Reason for Stopping:                 Patient Follow Up Instructions:    Activity: Activity as tolerated  Diet: Resume previous diet  Wound Care: None needed    Follow-up Information     Follow up With Specialties Details Why [address] [place] [address], [address] [place] [address], [place] [address]  [phone]          ________________________________________________________________    Risk of deterioration: Low    Condition at Discharge:  Stable  __________________________________________________________________    Disposition  Home with family, no needs    ____________________________________________________________________    Code Status: Full Code  ___________________________________________________________________      Total time in minutes spent coordinating this discharge (includes going over instructions, follow-up, prescriptions, and preparing report for sign off to her PCP) :  35   minutes    Signed:  Bambi Magana MD

## 2019-09-02 NOTE — PROGRESS NOTES
Medicare pt has received, reviewed, and signed 2nd IM letter informing them of their right to appeal the discharge. Signed copy has been placed on pt bedside chart.     Mauricio Wood, [address]

## 2019-09-02 NOTE — PROGRESS NOTES
Patient ambulates with stand by assist with walker to bathroom. No c/o pain. Skin intact with redness and swelling to left knee. Tylenol given for low grade fever.

## 2019-09-02 NOTE — PROGRESS NOTES
Orthopedic End of Shift Note    Bedside and Verbal shift change report given to [address] (oncoming nurse) by Kristi Interiano (offgoing nurse). Report included the following information SBAR, Kardex, Intake/Output, MAR and Recent Results. POD#     Significant issues during shift: Patient ambulates with stand by assist with walker to bathroom. No c/o pain. Skin intact with redness and swelling to left knee. Tylenol given for low grade fever. Issues for Physician to address:   Patient ambulates with stand by assist with walker to bathroom. No c/o pain. Skin intact with redness and swelling to left knee. Tylenol given for low grade fever.    Activity This Shift  (check all that apply) [] chair  [] dangle   [x] bathroom  [] bedside commode [] hallway  [] bedrest   Nausea/Vomiting [] yes [x] no     Voiding Status [x] void [] Martins [] I&O Cath   Bowel Movements [] yes [x] no     Foot Pumps or SCD [] yes [] no    Ice Pack [] yes    [x] no    Incentive Spirometer [] yes [] no Volume:      Telemetry Monitoring   [] yes [x] no Rhythm:   Supplemental O2 [] yes [x] no Sat off O2:   97%

## 2019-09-02 NOTE — PROGRESS NOTES
Pharmacy Automatic Renal Dosing Protocol - Antimicrobials    Indication for Antimicrobials: Left knee infection    Current Regimen of Each Antimicrobial:  Vancomycin pharmacy to dose (Start  - Day 3)  Ceftriaxone 1 gram iv q24h ( - Day 3    Previous Antimicrobial Therapy:      Goal Level: VANCOMYCIN TROUGH GOAL RANGE    Vancomycin Trough: 15 - 20 mcg/mL  (AUC: 400 - 600 mg/hr/Liter/day)     Date Dose & Interval Measured (mcg/mL) Extrapolated (mcg/mL)    1000 Q16 10.5 8.88 mcg/ml                 Date & time of next level:     Significant Cultures:    Urine culture  - NG - Final  Blood  - NG - Prelim  Body Fluid  NG - Prelim    Radiology / Imaging results: (X-ray, CT scan or MRI):     Paralysis, amputations, malnutrition:     Labs:  Recent Labs     19  0011 19  0356 19  0510   CREA 0.89 0.78 0.90   BUN 14 12 19   WBC 9.4 11.3* 11.8*     Temp (24hrs), Av.1 °F (37.3 °C), Min:98.6 °F (37 °C), Max:99.6 °F (37.6 °C)    Creatinine Clearance (mL/min) or Dialysis: 50 ml/min    Impression/Plan:   Increase vancomycin dosing to 1000 mg IV Q12H for predicted trough of 14.4 mcg/ml  Rocephin dose is appropriate. Antimicrobial stop date : TBD     Pharmacy will follow daily and adjust medications as appropriate for renal function and/or serum levels. Thank you,  Dianne Padilla, PHARMD    Recommended duration of therapy  [email or web address]. docx    Renal Dosing  [email or web address]/Renal%[email or web address]. pdf

## 2019-09-05 LAB
BACTERIA SPEC CULT: NORMAL
SERVICE CMNT-IMP: NORMAL

## 2019-09-13 LAB
BACTERIA SPEC CULT: NORMAL
BACTERIA SPEC CULT: NORMAL
GRAM STN SPEC: NORMAL
GRAM STN SPEC: NORMAL
SERVICE CMNT-IMP: NORMAL

## 2019-11-13 ENCOUNTER — HOSPITAL ENCOUNTER (OUTPATIENT)
Age: 79
Setting detail: OUTPATIENT SURGERY
Discharge: HOME | End: 2019-11-13
Attending: SPECIALIST | Admitting: SPECIALIST
Payer: MEDICARE

## 2019-11-13 ENCOUNTER — ANESTHESIA EVENT (OUTPATIENT)
Dept: ENDOSCOPY | Age: 79
End: 2019-11-13
Payer: MEDICARE

## 2019-11-13 ENCOUNTER — ANESTHESIA (OUTPATIENT)
Dept: ENDOSCOPY | Age: 79
End: 2019-11-13
Payer: MEDICARE

## 2019-11-13 VITALS
OXYGEN SATURATION: 96 % | RESPIRATION RATE: 18 BRPM | WEIGHT: 149.56 LBS | SYSTOLIC BLOOD PRESSURE: 162 MMHG | TEMPERATURE: 97.7 F | HEIGHT: 66 IN | HEART RATE: 72 BPM | DIASTOLIC BLOOD PRESSURE: 64 MMHG | BODY MASS INDEX: 24.04 KG/M2

## 2019-11-13 LAB
H PYLORI FROM TISSUE: NEGATIVE
KIT LOT NO., HCLOLOT: NORMAL
NEGATIVE CONTROL: NEGATIVE
POSITIVE CONTROL: POSITIVE

## 2019-11-13 PROCEDURE — 74011000250 HC RX REV CODE- 250: Performed by: NURSE ANESTHETIST, CERTIFIED REGISTERED

## 2019-11-13 PROCEDURE — 76040000007: Performed by: SPECIALIST

## 2019-11-13 PROCEDURE — 74011250636 HC RX REV CODE- 250/636: Performed by: NURSE ANESTHETIST, CERTIFIED REGISTERED

## 2019-11-13 PROCEDURE — 88302 TISSUE EXAM BY PATHOLOGIST: CPT

## 2019-11-13 PROCEDURE — 88305 TISSUE EXAM BY PATHOLOGIST: CPT

## 2019-11-13 PROCEDURE — 87077 CULTURE AEROBIC IDENTIFY: CPT | Performed by: SPECIALIST

## 2019-11-13 PROCEDURE — 77030019988 HC FCPS ENDOSC DISP BSC -B: Performed by: SPECIALIST

## 2019-11-13 PROCEDURE — 76060000032 HC ANESTHESIA 0.5 TO 1 HR: Performed by: SPECIALIST

## 2019-11-13 PROCEDURE — 77030013992 HC SNR POLYP ENDOSC BSC -B: Performed by: SPECIALIST

## 2019-11-13 PROCEDURE — 74011250636 HC RX REV CODE- 250/636: Performed by: SPECIALIST

## 2019-11-13 RX ORDER — SODIUM CHLORIDE 0.9 % (FLUSH) 0.9 %
5-40 SYRINGE (ML) INJECTION AS NEEDED
Status: DISCONTINUED | OUTPATIENT
Start: 2019-11-13 | End: 2019-11-13 | Stop reason: HOSPADM

## 2019-11-13 RX ORDER — LIDOCAINE HYDROCHLORIDE 20 MG/ML
INJECTION, SOLUTION EPIDURAL; INFILTRATION; INTRACAUDAL; PERINEURAL AS NEEDED
Status: DISCONTINUED | OUTPATIENT
Start: 2019-11-13 | End: 2019-11-13 | Stop reason: HOSPADM

## 2019-11-13 RX ORDER — EPHEDRINE SULFATE/0.9% NACL/PF 50 MG/5 ML
SYRINGE (ML) INTRAVENOUS AS NEEDED
Status: DISCONTINUED | OUTPATIENT
Start: 2019-11-13 | End: 2019-11-13 | Stop reason: HOSPADM

## 2019-11-13 RX ORDER — DEXTROMETHORPHAN/PSEUDOEPHED 2.5-7.5/.8
1.2 DROPS ORAL
Status: DISCONTINUED | OUTPATIENT
Start: 2019-11-13 | End: 2019-11-13 | Stop reason: HOSPADM

## 2019-11-13 RX ORDER — SODIUM CHLORIDE 0.9 % (FLUSH) 0.9 %
5-40 SYRINGE (ML) INJECTION EVERY 8 HOURS
Status: DISCONTINUED | OUTPATIENT
Start: 2019-11-13 | End: 2019-11-13 | Stop reason: HOSPADM

## 2019-11-13 RX ORDER — SODIUM CHLORIDE 9 MG/ML
50 INJECTION, SOLUTION INTRAVENOUS CONTINUOUS
Status: DISCONTINUED | OUTPATIENT
Start: 2019-11-13 | End: 2019-11-13 | Stop reason: HOSPADM

## 2019-11-13 RX ORDER — PHENOL/SODIUM PHENOLATE
20 AEROSOL, SPRAY (ML) MUCOUS MEMBRANE DAILY
Qty: 30 TAB | Refills: 2 | Status: SHIPPED | OUTPATIENT
Start: 2019-11-13

## 2019-11-13 RX ORDER — GLYCOPYRROLATE 0.2 MG/ML
INJECTION INTRAMUSCULAR; INTRAVENOUS AS NEEDED
Status: DISCONTINUED | OUTPATIENT
Start: 2019-11-13 | End: 2019-11-13 | Stop reason: HOSPADM

## 2019-11-13 RX ORDER — PROPOFOL 10 MG/ML
INJECTION, EMULSION INTRAVENOUS AS NEEDED
Status: DISCONTINUED | OUTPATIENT
Start: 2019-11-13 | End: 2019-11-13 | Stop reason: HOSPADM

## 2019-11-13 RX ADMIN — PROPOFOL 20 MG: 10 INJECTION, EMULSION INTRAVENOUS at 08:38

## 2019-11-13 RX ADMIN — PROPOFOL 20 MG: 10 INJECTION, EMULSION INTRAVENOUS at 08:36

## 2019-11-13 RX ADMIN — PROPOFOL 20 MG: 10 INJECTION, EMULSION INTRAVENOUS at 08:22

## 2019-11-13 RX ADMIN — LIDOCAINE HYDROCHLORIDE 40 MG: 20 INJECTION, SOLUTION EPIDURAL; INFILTRATION; INTRACAUDAL; PERINEURAL at 08:12

## 2019-11-13 RX ADMIN — PROPOFOL 20 MG: 10 INJECTION, EMULSION INTRAVENOUS at 08:14

## 2019-11-13 RX ADMIN — PROPOFOL 20 MG: 10 INJECTION, EMULSION INTRAVENOUS at 08:29

## 2019-11-13 RX ADMIN — PROPOFOL 20 MG: 10 INJECTION, EMULSION INTRAVENOUS at 08:19

## 2019-11-13 RX ADMIN — SODIUM CHLORIDE 50 ML/HR: 900 INJECTION, SOLUTION INTRAVENOUS at 07:50

## 2019-11-13 RX ADMIN — GLYCOPYRROLATE 0.2 MG: 0.2 INJECTION, SOLUTION INTRAMUSCULAR; INTRAVENOUS at 08:10

## 2019-11-13 RX ADMIN — PROPOFOL 80 MG: 10 INJECTION, EMULSION INTRAVENOUS at 08:12

## 2019-11-13 RX ADMIN — Medication 15 MG: at 08:31

## 2019-11-13 RX ADMIN — PROPOFOL 20 MG: 10 INJECTION, EMULSION INTRAVENOUS at 08:16

## 2019-11-13 RX ADMIN — PROPOFOL 20 MG: 10 INJECTION, EMULSION INTRAVENOUS at 08:25

## 2019-11-13 RX ADMIN — PROPOFOL 20 MG: 10 INJECTION, EMULSION INTRAVENOUS at 08:34

## 2019-11-13 RX ADMIN — PROPOFOL 20 MG: 10 INJECTION, EMULSION INTRAVENOUS at 08:28

## 2019-11-13 RX ADMIN — PROPOFOL 20 MG: 10 INJECTION, EMULSION INTRAVENOUS at 08:32

## 2019-11-13 NOTE — DISCHARGE INSTRUCTIONS
Arian Simmons  [id number]  1940    COLON / EGD DISCHARGE INSTRUCTIONS  Discomfort:  Sore throat- throat lozenges or warm salt water gargle  Redness at IV site- apply warm compress to area; if redness or soreness persist- contact your physician  There may be a slight amount of blood passed from the rectum  Gaseous discomfort- walking, belching will help relieve any discomfort  You may not operate a vehicle for 12 hours  You may not engage in an occupation involving machinery or appliances for rest of today  You may not drink alcoholic beverages for at least 12 hours  Avoid making any critical decisions for at least 24 hour  DIET:   Regular diet. - however -  remember your colon is empty and a heavy meal will produce gas. Avoid these foods:  vegetables, fried / greasy foods, carbonated drinks for today     ACTIVITY:  You may  resume your normal daily activities it is recommended that you spend the remainder of the day resting -  avoid any strenuous activity. CALL M.D. ANY SIGN OF:   Increasing pain, nausea, vomiting  Abdominal distension (swelling)  New increased bleeding (oral or rectal)  Fever (chills)  Pain in chest area  Bloody discharge from nose or mouth  Shortness of breath    You may not  take any Advil, Aspirin, Ibuprofen, Motrin, Aleve, or Goodys for 10 days, ONLY  Tylenol as needed for pain.       Follow-up Instructions:   Call Dr. Royer Dailey for results of procedure / biopsy in 7 days at telephone #  [phone]  Additional instructions: Avoid aspirin type medications                                       Take Omeprazole 20 mg daily for 3 months to heal ulcers                  Arian Simmons  [id number]  1940        DISCHARGE SUMMARY from Nurse    The following personal items collected during your admission are returned to you:   Dental Appliance: Dental Appliances: Lowers, Uppers  Vision: Visual Aid: Glasses  Hearing Aid:    Jewelry:    Clothing:    Other Valuables:    Valuables sent to safe:       PATIENT INSTRUCTIONS:    Take Home Medications:

## 2019-11-13 NOTE — PROCEDURES
Colonoscopy Procedure Note    Indications:   Screening colonoscopy. Constipation    Referring Physician: Lawrence Fuentes MD  Anesthesia/Sedation: MAC anesthesia Propofol  Endoscopist:  Dr. Chandler Madrid    Procedure in Detail:  Informed consent was obtained for the procedure, including sedation. Risks of perforation, hemorrhage, adverse drug reaction, and aspiration were discussed. The patient was placed in the left lateral decubitus position. Based on the pre-procedure assessment, including review of the patient's medical history, medications, allergies, and review of systems, she had been deemed to be an appropriate candidate for moderate sedation; she was therefore sedated with the medications listed above. The patient was monitored continuously with ECG tracing, pulse oximetry, blood pressure monitoring, and direct observations. A rectal examination was performed. The [id number] was inserted into the rectum and advanced under direct vision to the cecum, which was identified by the ileocecal valve and appendiceal orifice. The quality of the colonic preparation was adequate. A careful inspection was made as the colonoscope was withdrawn, including a retroflexed view of the rectum; findings and interventions are described below. Appropriate photodocumentation was obtained. Findings:     1. Scope advanced to the cecum. 2.  There was moderately severe diverticulosis in the sigmoid colon. 3.  Sessile 5 mm polyp in the sigmoid s/p cold snare removal       Sessile 3 mm polyp in the cecum s/p cold snare removal  4. Small internal hemorrhoids. Therapies:  As above    Specimen: Specimens were collected as described above and sent to pathology. Complications: None were encountered during the procedure. EBL: < 10 ml.     Recommendations:   -F/U Path to determine next colonoscopy interval    Signed By: Jairo Gomez MD                        November 13, 2019

## 2019-11-13 NOTE — PERIOP NOTES
Endoscope was pre-cleaned at the bedside immediately following procedure by Macarena Jane RN    Anesthesia reports 300mg Propofol, 40mg Lidocaine 0.2 mg Robinul nd 700mL NS given during procedure. Received report from anesthesia staff on vital signs and status of patient. Glasses and dentures returned to patient.

## 2019-11-13 NOTE — ANESTHESIA PREPROCEDURE EVALUATION
Relevant Problems   No relevant active problems       Anesthetic History     PONV          Review of Systems / Medical History  Patient summary reviewed, nursing notes reviewed and pertinent labs reviewed    Pulmonary  Within defined limits                 Neuro/Psych         Psychiatric history     Cardiovascular    Hypertension        Dysrhythmias : PVC      Exercise tolerance: >4 METS     GI/Hepatic/Renal     GERD           Endo/Other        Arthritis     Other Findings              Physical Exam    Airway  Mallampati: II  TM Distance: 4 - 6 cm  Neck ROM: normal range of motion   Mouth opening: Normal     Cardiovascular  Regular rate and rhythm,  S1 and S2 normal,  no murmur, click, rub, or gallop             Dental    Dentition: Full upper dentures and Full lower dentures     Pulmonary  Breath sounds clear to auscultation               Abdominal  GI exam deferred       Other Findings            Anesthetic Plan    ASA: 2  Anesthesia type: MAC            Anesthetic plan and risks discussed with: Patient

## 2019-11-13 NOTE — ANESTHESIA POSTPROCEDURE EVALUATION
Procedure(s):  ESOPHAGOGASTRODUODENOSCOPY (EGD)  COLONOSCOPY  ESOPHAGOGASTRODUODENAL (EGD) BIOPSY  ENDOSCOPIC POLYPECTOMY. MAC    Anesthesia Post Evaluation        Patient location during evaluation: PACU  Note status: Adequate. Level of consciousness: responsive to verbal stimuli and sleepy but conscious  Pain management: satisfactory to patient  Airway patency: patent  Anesthetic complications: no  Cardiovascular status: acceptable  Respiratory status: acceptable  Hydration status: acceptable  Comments: +Post-Anesthesia Evaluation and Assessment    Patient: Aneudy Alvarado MRN: [id number]  SSN: [id number]   YOB: 1940  Age: 78 y.o. Sex: female      Cardiovascular Function/Vital Signs    /64   Pulse 72   Temp 36.5 °C (97.7 °F)   Resp 18   Ht 5' 6\" (1.676 m)   Wt 67.8 kg (149 lb 9 oz)   SpO2 96%   Breastfeeding? No   BMI 24.14 kg/m²     Patient is status post Procedure(s):  ESOPHAGOGASTRODUODENOSCOPY (EGD)  COLONOSCOPY  ESOPHAGOGASTRODUODENAL (EGD) BIOPSY  ENDOSCOPIC POLYPECTOMY. Nausea/Vomiting: Controlled. Postoperative hydration reviewed and adequate. Pain:  Pain Scale 1: Numeric (0 - 10) (11/13/19 0915)  Pain Intensity 1: 0 (11/13/19 0915)   Managed. Neurological Status: At baseline. Mental Status and Level of Consciousness: Arousable. Pulmonary Status:   O2 Device: Room air (11/13/19 0915)   Adequate oxygenation and airway patent. Complications related to anesthesia: None    Post-anesthesia assessment completed. No concerns. Signed By: Tatyana Avila MD    11/13/2019  Post anesthesia nausea and vomiting:  controlled      Vitals Value Taken Time   /64 11/13/2019  9:15 AM   Temp 36.5 °C (97.7 °F) 11/13/2019  8:55 AM   Pulse 69 11/13/2019  9:16 AM   Resp 16 11/13/2019  9:16 AM   SpO2 98 % 11/13/2019  9:16 AM   Vitals shown include unvalidated device data.

## 2019-11-13 NOTE — PROCEDURES
Esophagogastroduodenoscopy Procedure Note      Sharon Brooks [facility]  1940  [id number]    Indication:  Dyspepsia, epigastric pain     Endoscopist: Braulio Osuna MD    Referring Provider:  Dawna Castle MD    Sedation:  MAC anesthesia Propofol    Procedure Details:  After infomed consent was obtained for the procedure, with all risks and benefits of procedure explained the patient was taken to the endoscopy suite and placed in the left lateral decubitus position. Following sequential administration of sedation as per above, the endoscope was inserted into the mouth and advanced under direct vision to second portion of the duodenum. A careful inspection was made as the gastroscope was withdrawn, including a retroflexed view of the proximal stomach; findings and interventions are described below. Findings:     Esophagus:   + Normal mucosa throughout the esophagus. Z line normal at 40 cm. - S/P bx of mid esophagus. Stomach:   + Erythema in the antrum with several small erosions c/w erosive gastritis s/p bx for DEVIKA and Path. Duodenum:   - The bulb and post bulbar mucosa is normal in appearance to the second portion. The duodenal folds appeared normal.  Cold forceps biopsies to r/o celiac. Therapies: as above    Specimen: Specimens were collected as described and send to the laboratory. Complications:   None were encountered during the procedure. EBL:  < 10 ml.           Recommendations:   -avoid NSAIDs  -PPI x 2 mo      Braulio Osuna MD  11/13/2019  8:46 AM

## 2019-11-13 NOTE — PERIOP NOTES
Blaine Rowan  1940  [id number]    Situation:  Verbal report received from: [facility], RN  Procedure: Procedure(s):  ESOPHAGOGASTRODUODENOSCOPY (EGD)  COLONOSCOPY  ESOPHAGOGASTRODUODENAL (EGD) BIOPSY  ENDOSCOPIC POLYPECTOMY    Background:    Preoperative diagnosis: constipaion  Postoperative diagnosis: egd - erosive gastritis   colon - polyps x 2     :  Dr. Wes Berman  Assistant(s): Endoscopy RN-1: Olvin Brambila RN  Endoscopy RN-2: Hilary Essex, RN    Specimens:   ID Type Source Tests Collected by Time Destination   1 : Stomach BX Preservative Stomach  Genna Mendoza MD 11/13/2019 0827 Pathology   2 : Duodenum BX Preservative Duodenum  Genna Mendoza MD 11/13/2019 0827 Pathology   3 : Mid esophagus BX Preservative Esophagus, Mid  Genna Mendoza MD 11/13/2019 0827 Pathology   4 : Cecum polyp Preservative Cecum  Genna Mendoza MD 11/13/2019 3314 Pathology   5 : sigmoid colon bx Preservative Sigmoid  Genna Mendoza MD 11/13/2019 9934 Pathology     H. Pylori  yes    Assessment:  Intra-procedure medications     Anesthesia gave intra-procedure sedation and medications, see anesthesia flow sheet yes    Intravenous fluids: NS@ KVO     Vital signs stable     Abdominal assessment: round and soft     Recommendation:  Discharge patient per MD order.   Family or Friend   Permission to share finding with family or friend yes

## 2019-11-19 ENCOUNTER — HOSPITAL ENCOUNTER (OUTPATIENT)
Dept: PREADMISSION TESTING | Age: 79
Discharge: HOME | End: 2019-11-19
Attending: ORTHOPAEDIC SURGERY
Payer: MEDICARE

## 2019-11-19 VITALS
WEIGHT: 150.35 LBS | HEIGHT: 66 IN | BODY MASS INDEX: 24.16 KG/M2 | TEMPERATURE: 97.7 F | SYSTOLIC BLOOD PRESSURE: 154 MMHG | OXYGEN SATURATION: 97 % | RESPIRATION RATE: 18 BRPM | HEART RATE: 58 BPM | DIASTOLIC BLOOD PRESSURE: 56 MMHG

## 2019-11-19 LAB
ALBUMIN SERPL-MCNC: 3.8 G/DL (ref 3.5–5)
ALBUMIN/GLOB SERPL: 1 {RATIO} (ref 1.1–2.2)
ALP SERPL-CCNC: 56 U/L (ref 45–117)
ALT SERPL-CCNC: 17 U/L (ref 12–78)
ANION GAP SERPL CALC-SCNC: 3 MMOL/L (ref 5–15)
APPEARANCE UR: CLEAR
AST SERPL-CCNC: 12 U/L (ref 15–37)
BACTERIA URNS QL MICRO: NEGATIVE /HPF
BILIRUB SERPL-MCNC: 0.9 MG/DL (ref 0.2–1)
BILIRUB UR QL: NEGATIVE
BUN SERPL-MCNC: 20 MG/DL (ref 6–20)
BUN/CREAT SERPL: 22 (ref 12–20)
CALCIUM SERPL-MCNC: 9.1 MG/DL (ref 8.5–10.1)
CHLORIDE SERPL-SCNC: 105 MMOL/L (ref 97–108)
CO2 SERPL-SCNC: 30 MMOL/L (ref 21–32)
COLOR UR: ABNORMAL
CREAT SERPL-MCNC: 0.9 MG/DL (ref 0.55–1.02)
EPITH CASTS URNS QL MICRO: ABNORMAL /LPF
ERYTHROCYTE [DISTWIDTH] IN BLOOD BY AUTOMATED COUNT: 15.3 % (ref 11.5–14.5)
EST. AVERAGE GLUCOSE BLD GHB EST-MCNC: 126 MG/DL
GLOBULIN SER CALC-MCNC: 4 G/DL (ref 2–4)
GLUCOSE SERPL-MCNC: 87 MG/DL (ref 65–100)
GLUCOSE UR STRIP.AUTO-MCNC: NEGATIVE MG/DL
HBA1C MFR BLD: 6 % (ref 4–5.6)
HCT VFR BLD AUTO: 36.6 % (ref 35–47)
HGB BLD-MCNC: 11.6 G/DL (ref 11.5–16)
HGB UR QL STRIP: NEGATIVE
HYALINE CASTS URNS QL MICRO: ABNORMAL /LPF (ref 0–5)
INR PPP: 1 (ref 0.9–1.1)
KETONES UR QL STRIP.AUTO: NEGATIVE MG/DL
LEUKOCYTE ESTERASE UR QL STRIP.AUTO: ABNORMAL
MCH RBC QN AUTO: 28.2 PG (ref 26–34)
MCHC RBC AUTO-ENTMCNC: 31.7 G/DL (ref 30–36.5)
MCV RBC AUTO: 89.1 FL (ref 80–99)
NITRITE UR QL STRIP.AUTO: NEGATIVE
NRBC # BLD: 0 K/UL (ref 0–0.01)
NRBC BLD-RTO: 0 PER 100 WBC
PH UR STRIP: 6 [PH] (ref 5–8)
PLATELET # BLD AUTO: 289 K/UL (ref 150–400)
PMV BLD AUTO: 11.4 FL (ref 8.9–12.9)
POTASSIUM SERPL-SCNC: 4.2 MMOL/L (ref 3.5–5.1)
PROT SERPL-MCNC: 7.8 G/DL (ref 6.4–8.2)
PROT UR STRIP-MCNC: NEGATIVE MG/DL
PROTHROMBIN TIME: 10.1 SEC (ref 9–11.1)
RBC # BLD AUTO: 4.11 M/UL (ref 3.8–5.2)
RBC #/AREA URNS HPF: ABNORMAL /HPF (ref 0–5)
SODIUM SERPL-SCNC: 138 MMOL/L (ref 136–145)
SP GR UR REFRACTOMETRY: 1.02 (ref 1–1.03)
UA: UC IF INDICATED,UAUC: ABNORMAL
UROBILINOGEN UR QL STRIP.AUTO: 0.2 EU/DL (ref 0.2–1)
WBC # BLD AUTO: 10.5 K/UL (ref 3.6–11)
WBC URNS QL MICRO: ABNORMAL /HPF (ref 0–4)

## 2019-11-19 PROCEDURE — 80053 COMPREHEN METABOLIC PANEL: CPT

## 2019-11-19 PROCEDURE — 85027 COMPLETE CBC AUTOMATED: CPT

## 2019-11-19 PROCEDURE — 83036 HEMOGLOBIN GLYCOSYLATED A1C: CPT

## 2019-11-19 PROCEDURE — 81001 URINALYSIS AUTO W/SCOPE: CPT

## 2019-11-19 PROCEDURE — 36415 COLL VENOUS BLD VENIPUNCTURE: CPT

## 2019-11-19 PROCEDURE — 85610 PROTHROMBIN TIME: CPT

## 2019-11-19 PROCEDURE — 87086 URINE CULTURE/COLONY COUNT: CPT

## 2019-11-19 RX ORDER — PREGABALIN 25 MG/1
75 CAPSULE ORAL ONCE
Status: CANCELLED | OUTPATIENT
Start: 2019-12-10 | End: 2019-12-10

## 2019-11-19 RX ORDER — CELECOXIB 100 MG/1
200 CAPSULE ORAL ONCE
Status: CANCELLED | OUTPATIENT
Start: 2019-12-10 | End: 2019-12-10

## 2019-11-19 RX ORDER — ACETAMINOPHEN 500 MG
1000 TABLET ORAL ONCE
Status: CANCELLED | OUTPATIENT
Start: 2019-12-10 | End: 2019-12-10

## 2019-11-19 RX ORDER — HEPARIN SODIUM 1000 [USP'U]/ML
1000 INJECTION, SOLUTION INTRAVENOUS; SUBCUTANEOUS ONCE
Status: CANCELLED | OUTPATIENT
Start: 2019-12-10 | End: 2019-12-10

## 2019-11-19 RX ORDER — CEFAZOLIN SODIUM 1 G/3ML
2 INJECTION, POWDER, FOR SOLUTION INTRAMUSCULAR; INTRAVENOUS ONCE
Status: CANCELLED | OUTPATIENT
Start: 2019-12-10 | End: 2019-12-10

## 2019-11-19 NOTE — PERIOP NOTES
Incentive Spirometer        Using the incentive spirometer helps expand the small air sacs of your lungs, helps you breathe deeply, and helps improve your lung function. Use your incentive spirometer twice a day (10 breaths each time) prior to surgery. How to Use Your Incentive Spirometer:  1. Hold the incentive spirometer in an upright position. 2. Breathe out as usual.   3. Place the mouthpiece in your mouth and seal your lips tightly around it. 4. Take a deep breath. Breathe in slowly and as deeply as possible. Keep the blue flow rate guide between the arrows. 5. Hold your breath as long as possible. Then exhale slowly and allow the piston to fall to the bottom of the column. 6. Rest for a few seconds and repeat steps one through five at least 10 times. PAT Tidal Volume____1800__________  x______3__________  Date_____11/19/19_____________    Cathalene Mercury THE INCENTIVE SPIROMETER WITH YOU TO THE HOSPITAL ON THE DAY OF YOUR SURGERY. Opportunity given to ask and answer questions as well as to observe return demonstration.     Patient signature_____________________________    Witness____________________________

## 2019-11-19 NOTE — PROGRESS NOTES
Preventing Infections Before and After  Your Surgery    IMPORTANT INSTRUCTIONS    Please read and follow these instructions carefully. If you are unable to comply with the below instructions your procedure will be cancelled. Every Night for Three (3) nights before your surgery:  1. Shower with an antibacterial soap, such as Dial, or the soap provided at your preassessment appointment. A shower is better than a bath for cleaning your skin. 2. If needed, ask someone to help you reach all areas of your body. Dont forget to clean your belly button with every shower. The night before your surgery: If you lose your Hibiclens please contact surgery center or you can purchase it at a local pharmacy  1. On the night before your surgery, shower with an antibacterial soap, such as Dial, or the soap provided at your preassessment appointment. 2. With one packet of Hibiclens in hand, turn water off.  3. Apply Hibiclens antiseptic skin cleanser with a clean, freshly washed washcloth. ? Gently apply to your body from chin to toes (except the genital area) and especially the area(s) where your incision(s) will be. ? Leave Hibiclens on your skin for at least 20 seconds. CAUTION: If needed, Hibiclens may be used to clean the folds of skin of the legs (such as in the area of the groin) and on your buttocks and hips. However, do not use Hibiclens above the neck or in the genital area (your bottom) or put inside any area of your body. 4. Turn the water back on and rinse. 5. Dry gently with a clean, freshly washed towel. 6. After your shower, do not use any powder, deodorant, perfumes or lotion. 7. Use clean, freshly washed towels and washcloths every time you shower. 8. Wear clean, freshly washed pajamas to bed the night before surgery. 9. Sleep on clean, freshly washed sheets. 10. Do not allow pets to sleep in your bed with you. The Morning of your surgery:  1.  Shower again thoroughly with an antibacterial soap, such as Dial or the soap provided at your preassessment appointment. If needed, ask someone for help to reach all areas of your body. Dont forget to clean your belly button! Rinse. 2. Dry gently with a clean, freshly washed towel. 3. After your shower, do not use any powder, deodorant, perfumes or lotion prior to surgery. 4. Put on clean, freshly washed clothing. Tips to help prevent infections after your surgery:  1. Protect your surgical wound from germs:  ? Hand washing is the most important thing you and your caregivers can do to prevent infections. ? Keep your bandage clean and dry! ? Do not touch your surgical wound. 2. Use clean, freshly washed towels and washcloths every time you shower; do not share bath linens with others. 3. Until your surgical wound is healed, wear clothing and sleep on bed linens each day that are clean and freshly washed. 4. Do not allow pets to sleep in your bed with you or touch your surgical wound. 5. Do not smoke  smoking delays wound healing. This may be a good time to stop smoking. 6. If you have diabetes, it is important for you to manage your blood sugar levels properly before your surgery as well as after your surgery. Poorly managed blood sugar levels slow down wound healing and prevent you from healing completely. If you lose your Hibiclens, please call the [facility], or it is available for purchase at your pharmacy.                ___________________      ___________________      ________________  (Signature of Patient)          (Witness)                   (Date and Time)

## 2019-11-19 NOTE — ADVANCED PRACTICE NURSE
PAT Nurse Practitioner   Pre-Operative Chart Review/Assessment:-ORTHOPEDIC/NEUROSURGICAL SPINE                Patient Name:  Heri Rodas                                                         Age:   78 y.o.    :  1940     Today's Date:  2019     Date of PAT:   19      Date of Surgery:    12/10/19     Procedure(s):  Left total knee replacement      Surgeon:   Cherise Nelson     Medical Clearance:  Dr. Nettles Render:      1)  Cardiac Clearance:  Not requested       2)  Diabetic Treatment Consult:  Not indicated-A1C 6.0      3)  Sleep Apnea evaluation:    Not indicated-NANDINI 1      4) Treatment for MRSA/Staph Aureus:  Negative       5) Additional Concerns:  Depression, GERD, hospital admission 19 for septic arthritis L knee                 Vital Signs:         Vitals:    19 1117   BP: 154/56   Pulse: (!) 58   Resp: 18   Temp: 97.7 °F (36.5 °C)   SpO2: 97%   Weight: 68.2 kg (150 lb 5.7 oz)   Height: 5' 6\" (1.676 m)            ____________________________________________  PAST MEDICAL HISTORY  Past Medical History:   Diagnosis Date    Arrhythmia     ? Josepha Bump    Colon polyps     Dr Crystal Sam Diverticulitis     GERD (gastroesophageal reflux disease)     OCCASIONALLY    Hypertension     Ill-defined condition     stomach ulcer    Nausea & vomiting     Other ill-defined conditions(799.89)     HI CHOLESTEROL    Other ill-defined conditions(799.89)     DIVERTICULOSIS (HX: DIVERTICULITIS)    Psychiatric disorder     DEPRESSION      ____________________________________________  PAST SURGICAL HISTORY  Past Surgical History:   Procedure Laterality Date    ABDOMEN SURGERY PROC UNLISTED      LYSIS OF ADHESIONS    COLONOSCOPY N/A 2019    COLONOSCOPY performed by [facility]ine Chavez [facility] at [facility]    HX APPENDECTOMY      HX CATARACT REMOVAL      BILAT WITH IOL    HX HYSTERECTOMY      HX KNEE ARTHROSCOPY      RIGHT KNEE     HX MOHS PROCEDURES      RIGHT SHOULDER--ROTATOR CUFF    HX OOPHORECTOMY      HX ORTHOPAEDIC  6/2009    RIGHT TKR    HX TUBAL LIGATION      HX UROLOGICAL      bladder sling    [facility] COLONOSCOPY FLX DX W/COLLJ SPEC WHEN PFRMD  6/25/2012           ____________________________________________  HOME MEDICATIONS    Current Outpatient Medications   Medication Sig    vit C/E/Zn/coppr/lutein/zeaxan (PRESERVISION AREDS-2 PO) Take  by mouth daily.  linaclotide (LINZESS PO) Take  by mouth.  Omeprazole delayed release (PRILOSEC D/R) 20 mg tablet Take 1 Tab by mouth daily. Indications: a stomach ulcer    ondansetron (ZOFRAN ODT) 4 mg disintegrating tablet Take 1 Tab by mouth every eight (8) hours as needed for Nausea.  acetaminophen (TYLENOL EXTRA STRENGTH) 500 mg tablet Take 1,000 mg by mouth every six (6) hours as needed for Pain.  aspirin 81 mg chewable tablet Take 81 mg by mouth daily.  omega-3 fatty acids-vitamin e (FISH OIL) 1,000 mg cap Take 1 Cap by mouth.  Calcium-Cholecalciferol, D3, (CALCIUM 600 WITH VITAMIN D3) 600 mg(1,500mg) -400 unit chew Take 600 mg by mouth.  timolol maleate 0.5 % DrpD ophthalmic solution Administer 1 Drop to left eye nightly.  FLUOROMETHOLONE (FML FORTE OP) Apply 1 Drop to eye daily (before breakfast).  lisinopril (PRINIVIL, ZESTRIL) 20 mg tablet Take  by mouth daily.  lovastatin (MEVACOR) 20 mg tablet Take 20 mg by mouth nightly.  citalopram (CELEXA) 20 mg tablet Take 10 mg by mouth nightly.      No current facility-administered medications for this encounter.       ____________________________________________  ALLERGIES  Allergies   Allergen Reactions    Codeine Nausea and Vomiting    Nsaids (Non-Steroidal Anti-Inflammatory Drug) Other (comments)     Pt has stomach ulcer      ____________________________________________  SOCIAL HISTORY  Social History     Tobacco Use    Smoking status: Never Smoker    Smokeless tobacco: Never Used   Substance Use Topics    Alcohol use: Yes     Comment: VERY RARE      ____________________________________________        Labs:     Results for Art Leonides (MRN [id number]) as of 11/20/2019 08:32   Ref.  Range 11/19/2019 09:57   WBC Latest Ref Range: 3.6 - 11.0 K/uL 10.5   NRBC Latest Ref Range: 0  WBC 0.0   RBC Latest Ref Range: 3.80 - 5.20 M/uL 4.11   HGB Latest Ref Range: 11.5 - 16.0 g/dL 11.6   HCT Latest Ref Range: 35.0 - 47.0 % 36.6   MCV Latest Ref Range: 80.0 - 99.0 FL 89.1   MCH Latest Ref Range: 26.0 - 34.0 PG 28.2   MCHC Latest Ref Range: 30.0 - 36.5 g/dL 31.7   RDW Latest Ref Range: 11.5 - 14.5 % 15.3 (H)   PLATELET Latest Ref Range: 150 - 400 K/uL 289   MPV Latest Ref Range: 8.9 - 12.9 FL 11.4   ABSOLUTE NRBC Latest Ref Range: 0.00 - 0.01 K/uL 0.00   Color Latest Units:   YELLOW/STRAW   Appearance Latest Ref Range: CLEAR   CLEAR   Specific gravity Latest Ref Range: 1.003 - 1.030   1.016   pH (UA) Latest Ref Range: 5.0 - 8.0   6.0   Protein Latest Ref Range: NEG mg/dL NEGATIVE   Glucose Latest Ref Range: NEG mg/dL NEGATIVE   Ketone Latest Ref Range: NEG mg/dL NEGATIVE   Blood Latest Ref Range: NEG   NEGATIVE   Bilirubin Latest Ref Range: NEG   NEGATIVE   Urobilinogen Latest Ref Range: 0.2 - 1.0 EU/dL 0.2   Nitrites Latest Ref Range: NEG   NEGATIVE   Leukocyte Esterase Latest Ref Range: NEG   MODERATE (A)   Epithelial cells Latest Ref Range: FEW /lpf FEW   WBC Latest Ref Range: 0 - 4 /hpf 5-10   RBC Latest Ref Range: 0 - 5 /hpf 0-5   Bacteria Latest Ref Range: NEG /hpf NEGATIVE   Hyaline cast Latest Ref Range: 0 - 5 /lpf 0-2   INR Latest Ref Range: 0.9 - 1.1   1.0   Prothrombin time Latest Ref Range: 9.0 - 11.1 sec 10.1   Sodium Latest Ref Range: 136 - 145 mmol/L 138   Potassium Latest Ref Range: 3.5 - 5.1 mmol/L 4.2   Chloride Latest Ref Range: 97 - 108 mmol/L 105   CO2 Latest Ref Range: 21 - 32 mmol/L 30   Anion gap Latest Ref Range: 5 - 15 mmol/L 3 (L)   Glucose Latest Ref Range: 65 - 100 mg/dL 87   BUN Latest Ref Range: 6 - 20 MG/DL 20   Creatinine Latest Ref Range: 0.55 - 1.02 MG/DL 0.90   BUN/Creatinine ratio Latest Ref Range: 12 - 20   22 (H)   Calcium Latest Ref Range: 8.5 - 10.1 MG/DL 9.1   GFR est non-AA Latest Ref Range: >60 ml/min/1.73m2 >60   GFR est AA Latest Ref Range: >60 ml/min/1.73m2 >60   Bilirubin, total Latest Ref Range: 0.2 - 1.0 MG/DL 0.9   Protein, total Latest Ref Range: 6.4 - 8.2 g/dL 7.8   Albumin Latest Ref Range: 3.5 - 5.0 g/dL 3.8   Globulin Latest Ref Range: 2.0 - 4.0 g/dL 4.0   A-G Ratio Latest Ref Range: 1.1 - 2.2   1.0 (L)   ALT (SGPT) Latest Ref Range: 12 - 78 U/L 17   AST Latest Ref Range: 15 - 37 U/L 12 (L)   Alk.  phosphatase Latest Ref Range: 45 - 117 U/L 56   Hemoglobin A1c, (calculated) Latest Ref Range: 4.0 - 5.6 % 6.0 (H)   Est. average glucose Latest Units: mg/dL 126   CULTURE, URINE Unknown Rpt   CULTURE, MRSA Unknown Rpt   CULTURE, URINE [[id number]] (Order [id number])   Microbiology   Date: 11/19/2019 Department: Mrm Or Preadmit Tsting Released By:  (auto-released) Authorizing: Yordan Gilbert MD   Specimen Information: Urine        Component Value Flag Ref Range Units Status   Special Requests:      Final   NO SPECIAL REQUESTS   Reflexed from [id number]    [place] Count [id number]   COLONIES/mL      Final   Culture result:      Final   MIXED UROGENITAL ALPA ISOLATED    Lab and Collection     CULTURE, URINE (Order: [id number]) - 11/19/2019   Result History     CULTURE, URINE (Order #[id number]) on 11/21/2019 - Order Result History Report   11/21/2019  8:39 AM - [place], Lab In [facility]     Specimen Information     Urine        Collected: 11/19/2019 0957       Final Report Date/time     Reported date Reported time   Nov 21, 2019 08:39 EST         Skin:     Denies open wounds, cuts, sores, rashes or other areas of concern in PAT assessment      Darius Bourgeois NP

## 2019-11-19 NOTE — PERIOP NOTES
[facility]  Joint/Spine Preoperative Instructions        Surgery Date 12/10/19          Time of Arrival 0800  [phone]    1. On the day of your surgery, please report to the Surgical Services Registration Desk and sign in at your designated time. The Surgery Center is located to the right of the Emergency Room. 2. You must have someone with you to drive you home. You should not drive a car for 24 hours following surgery. Please make arrangements for a friend or family member to stay with you for the first 24 hours after your surgery. 3. No food after midnight 12/09/19. Medications morning of surgery should be taken with a sip of water. Please follow pre-surgery drink instructions that were given at your Pre Admission Testing appointment. 4. We recommend you do not drink any alcoholic beverages for 24 hours before and after your surgery. 5. Contact your surgeons office for instructions on the following medications: non-steroidal anti-inflammatory drugs (i.e. Advil, Aleve), vitamins, and supplements. (Some surgeons will want you to stop these medications prior to surgery and others may allow you to take them)  **If you are currently taking Plavix, Coumadin, Aspirin and/or other blood-thinning agents, contact your surgeon for instructions. ** Your surgeon will partner with the physician prescribing these medications to determine if it is safe to stop or if you need to continue taking. Please do not stop taking these medications without instructions from your surgeon    6. Wear comfortable clothes. Wear glasses instead of contacts. Do not bring any money or jewelry. Please bring picture ID, insurance card, and any prearranged co-payment or hospital payment. Do not wear make-up, particularly mascara the morning of your surgery. Do not wear nail polish, particularly if you are having foot /hand surgery.   Wear your hair loose or down, no ponytails, buns, hilario pins or clips. All body piercings must be removed. Please shower with antibacterial soap for three consecutive days before and on the morning of surgery, but do not apply any lotions, powders or deodorants after the shower on the day of surgery. Please use a fresh towels after each shower. Please sleep in clean clothes and change bed linens the night before surgery. Please do not shave for 48 hours prior to surgery. Shaving of the face is acceptable. 7. You should understand that if you do not follow these instructions your surgery may be cancelled. If your physical condition changes (I.e. fever, cold or flu) please contact your surgeon as soon as possible. 8. It is important that you be on time. If a situation occurs where you may be late, please call [phone] (OR Holding Area). 9. If you have any questions and or problems, please call [phone] (Pre-admission Testing). 10. Your surgery time may be subject to change. You will receive a phone call the evening prior if your time changes. 11.  If having outpatient surgery, you must have someone to drive you here, stay with you during the duration of your stay, and to drive you home at time of discharge. 12.   In an effort to improve the efficiency, privacy, and safety for all of our Pre-op patients visitors are not allowed in the Holding area. Once you arrive and are registered your family/visitors will be asked to remain in the waiting room. The Pre-op staff will get you from the Surgical Waiting Area and will explain to you and your family/visitors that the Pre-op phase is beginning. The staff will answer any questions and provide instructions for tracking of the patient, by use of the existing tracking number and color-coded status board in the waiting room.   At this time the staff will also ask for your designated spokesperson information in the event that the physician or staff need to provide an update or obtain any pertinent information. The designated spokesperson will be notified if the physician needs to speak to family during the pre-operative phase. If at any time your family/visitors has questions or concerns they may approach the volunteer desk in the waiting area for assistance. Special Instructions: Bring Incentive Spirometer back to hospital with you. MEDICATIONS TO TAKE THE MORNING OF SURGERY WITH A SIP OF WATER:Omeprazole, eye drops and tylenol if needed       I understand a pre-operative phone call will be made to verify my surgery time. In the event that I am not available, I give permission for a message to be left on my answering service and/or with another person?   yes         ___________________      __________   _________    (Signature of Patient)             (Witness)                (Date and Time)

## 2019-11-20 LAB
BACTERIA SPEC CULT: NORMAL
BACTERIA SPEC CULT: NORMAL
SERVICE CMNT-IMP: NORMAL

## 2019-11-21 LAB
BACTERIA SPEC CULT: NORMAL
CC UR VC: NORMAL
SERVICE CMNT-IMP: NORMAL

## 2019-12-03 ENCOUNTER — HOSPITAL ENCOUNTER (OUTPATIENT)
Dept: PREADMISSION TESTING | Age: 79
Discharge: HOME | End: 2019-12-03
Payer: MEDICARE

## 2019-12-03 LAB
ABO + RH BLD: NORMAL
BLOOD GROUP ANTIBODIES SERPL: NORMAL
SPECIMEN EXP DATE BLD: NORMAL

## 2019-12-03 PROCEDURE — 86900 BLOOD TYPING SEROLOGIC ABO: CPT

## 2019-12-03 PROCEDURE — 36415 COLL VENOUS BLD VENIPUNCTURE: CPT

## 2019-12-10 ENCOUNTER — ANESTHESIA (OUTPATIENT)
Dept: SURGERY | Age: 79
DRG: 470 | End: 2019-12-10
Payer: MEDICARE

## 2019-12-10 ENCOUNTER — HOSPITAL ENCOUNTER (INPATIENT)
Age: 79
LOS: 1 days | Discharge: HOME HEALTH | DRG: 470 | End: 2019-12-11
Attending: ORTHOPAEDIC SURGERY | Admitting: ORTHOPAEDIC SURGERY
Payer: MEDICARE

## 2019-12-10 ENCOUNTER — ANESTHESIA EVENT (OUTPATIENT)
Dept: SURGERY | Age: 79
DRG: 470 | End: 2019-12-10
Payer: MEDICARE

## 2019-12-10 ENCOUNTER — HOME HEALTH ADMISSION (OUTPATIENT)
Dept: HOME HEALTH SERVICES | Facility: HOME HEALTH | Age: 79
End: 2019-12-10
Payer: MEDICARE

## 2019-12-10 DIAGNOSIS — Z96.652 STATUS POST TOTAL LEFT KNEE REPLACEMENT: Primary | ICD-10-CM

## 2019-12-10 PROBLEM — M17.12 OSTEOARTHROSIS, LOCALIZED, PRIMARY, KNEE, LEFT: Status: ACTIVE | Noted: 2019-12-10

## 2019-12-10 PROCEDURE — 77030008684 HC TU ET CUF COVD -B: Performed by: NURSE ANESTHETIST, CERTIFIED REGISTERED

## 2019-12-10 PROCEDURE — 77030011640 HC PAD GRND REM COVD -A: Performed by: ORTHOPAEDIC SURGERY

## 2019-12-10 PROCEDURE — 74011250636 HC RX REV CODE- 250/636: Performed by: NURSE ANESTHETIST, CERTIFIED REGISTERED

## 2019-12-10 PROCEDURE — 77030018846 HC SOL IRR STRL H20 ICUM -A: Performed by: ORTHOPAEDIC SURGERY

## 2019-12-10 PROCEDURE — 77030010785: Performed by: ORTHOPAEDIC SURGERY

## 2019-12-10 PROCEDURE — 0SRD0J9 REPLACEMENT OF LEFT KNEE JOINT WITH SYNTHETIC SUBSTITUTE, CEMENTED, OPEN APPROACH: ICD-10-PCS | Performed by: ORTHOPAEDIC SURGERY

## 2019-12-10 PROCEDURE — 77030002912 HC SUT ETHBND J&J -A: Performed by: ORTHOPAEDIC SURGERY

## 2019-12-10 PROCEDURE — 77030038552 HC DRN WND MDII -A: Performed by: ORTHOPAEDIC SURGERY

## 2019-12-10 PROCEDURE — 77030019908 HC STETH ESOPH SIMS -A: Performed by: NURSE ANESTHETIST, CERTIFIED REGISTERED

## 2019-12-10 PROCEDURE — 74011250636 HC RX REV CODE- 250/636: Performed by: ANESTHESIOLOGY

## 2019-12-10 PROCEDURE — 77030039497 HC CST PAD STERILE CHCS -A: Performed by: ORTHOPAEDIC SURGERY

## 2019-12-10 PROCEDURE — 77030031139 HC SUT VCRL2 J&J -A: Performed by: ORTHOPAEDIC SURGERY

## 2019-12-10 PROCEDURE — 97110 THERAPEUTIC EXERCISES: CPT

## 2019-12-10 PROCEDURE — 94760 N-INVAS EAR/PLS OXIMETRY 1: CPT

## 2019-12-10 PROCEDURE — 74011000250 HC RX REV CODE- 250: Performed by: NURSE ANESTHETIST, CERTIFIED REGISTERED

## 2019-12-10 PROCEDURE — 97530 THERAPEUTIC ACTIVITIES: CPT

## 2019-12-10 PROCEDURE — 77030013708 HC HNDPC SUC IRR PULS STRY –B: Performed by: ORTHOPAEDIC SURGERY

## 2019-12-10 PROCEDURE — 77030018836 HC SOL IRR NACL ICUM -A: Performed by: ORTHOPAEDIC SURGERY

## 2019-12-10 PROCEDURE — 77030040361 HC SLV COMPR DVT MDII -B: Performed by: ORTHOPAEDIC SURGERY

## 2019-12-10 PROCEDURE — 76210000006 HC OR PH I REC 0.5 TO 1 HR: Performed by: ORTHOPAEDIC SURGERY

## 2019-12-10 PROCEDURE — 74011000250 HC RX REV CODE- 250: Performed by: ORTHOPAEDIC SURGERY

## 2019-12-10 PROCEDURE — 77030028907 HC WRP KNEE WO BGS SOLM -B

## 2019-12-10 PROCEDURE — 74011000272 HC RX REV CODE- 272: Performed by: ORTHOPAEDIC SURGERY

## 2019-12-10 PROCEDURE — 74011000250 HC RX REV CODE- 250

## 2019-12-10 PROCEDURE — 77030006835 HC BLD SAW SAG STRY -B: Performed by: ORTHOPAEDIC SURGERY

## 2019-12-10 PROCEDURE — 76060000034 HC ANESTHESIA 1.5 TO 2 HR: Performed by: ORTHOPAEDIC SURGERY

## 2019-12-10 PROCEDURE — 65270000029 HC RM PRIVATE

## 2019-12-10 PROCEDURE — 77030020268 HC MISC GENERAL SUPPLY: Performed by: ORTHOPAEDIC SURGERY

## 2019-12-10 PROCEDURE — 77030008462 HC STPLR SKN PROX J&J -A: Performed by: ORTHOPAEDIC SURGERY

## 2019-12-10 PROCEDURE — 77030018673: Performed by: ORTHOPAEDIC SURGERY

## 2019-12-10 PROCEDURE — 97162 PT EVAL MOD COMPLEX 30 MIN: CPT

## 2019-12-10 PROCEDURE — 74011250636 HC RX REV CODE- 250/636: Performed by: ORTHOPAEDIC SURGERY

## 2019-12-10 PROCEDURE — 77030040922 HC BLNKT HYPOTHRM STRY -A

## 2019-12-10 PROCEDURE — C1776 JOINT DEVICE (IMPLANTABLE): HCPCS | Performed by: ORTHOPAEDIC SURGERY

## 2019-12-10 PROCEDURE — 74011000258 HC RX REV CODE- 258: Performed by: ORTHOPAEDIC SURGERY

## 2019-12-10 PROCEDURE — C1713 ANCHOR/SCREW BN/BN,TIS/BN: HCPCS | Performed by: ORTHOPAEDIC SURGERY

## 2019-12-10 PROCEDURE — 77030020061 HC IV BLD WRMR ADMIN SET 3M -B: Performed by: NURSE ANESTHETIST, CERTIFIED REGISTERED

## 2019-12-10 PROCEDURE — 74011250637 HC RX REV CODE- 250/637: Performed by: PHYSICIAN ASSISTANT

## 2019-12-10 PROCEDURE — 77030000032 HC CUF TRNQT ZIMM -B: Performed by: ORTHOPAEDIC SURGERY

## 2019-12-10 PROCEDURE — 76010000162 HC OR TIME 1.5 TO 2 HR INTENSV-TIER 1: Performed by: ORTHOPAEDIC SURGERY

## 2019-12-10 PROCEDURE — 74011250637 HC RX REV CODE- 250/637: Performed by: ORTHOPAEDIC SURGERY

## 2019-12-10 DEVICE — IMPLANTABLE DEVICE
Type: IMPLANTABLE DEVICE | Site: KNEE | Status: FUNCTIONAL
Brand: VANGUARD® KNEE SYSTEM

## 2019-12-10 DEVICE — KNEE CEM FEM/TIB ARC/PAT -- VANGUARD K1: Type: IMPLANTABLE DEVICE | Status: FUNCTIONAL

## 2019-12-10 DEVICE — IMPLANTABLE DEVICE
Type: IMPLANTABLE DEVICE | Site: KNEE | Status: FUNCTIONAL
Brand: BIOMET® KNEE SYSTEM

## 2019-12-10 DEVICE — IMPLANTABLE DEVICE
Type: IMPLANTABLE DEVICE | Site: KNEE | Status: FUNCTIONAL
Brand: BIOMET KNEE SYSTEM

## 2019-12-10 DEVICE — CEMENT BNE BIOMET HV R1X40GM --: Type: IMPLANTABLE DEVICE | Site: KNEE | Status: FUNCTIONAL

## 2019-12-10 RX ORDER — FENTANYL CITRATE 50 UG/ML
25 INJECTION, SOLUTION INTRAMUSCULAR; INTRAVENOUS
Status: DISCONTINUED | OUTPATIENT
Start: 2019-12-10 | End: 2019-12-10 | Stop reason: HOSPADM

## 2019-12-10 RX ORDER — ONDANSETRON 4 MG/1
4 TABLET, ORALLY DISINTEGRATING ORAL
Status: DISCONTINUED | OUTPATIENT
Start: 2019-12-12 | End: 2019-12-11 | Stop reason: HOSPADM

## 2019-12-10 RX ORDER — CEFAZOLIN SODIUM 1 G/3ML
2 INJECTION, POWDER, FOR SOLUTION INTRAMUSCULAR; INTRAVENOUS ONCE
Status: DISCONTINUED | OUTPATIENT
Start: 2019-12-10 | End: 2019-12-10 | Stop reason: HOSPADM

## 2019-12-10 RX ORDER — TIMOLOL MALEATE 5 MG/ML
1 SOLUTION/ DROPS OPHTHALMIC
Status: DISCONTINUED | OUTPATIENT
Start: 2019-12-10 | End: 2019-12-11 | Stop reason: HOSPADM

## 2019-12-10 RX ORDER — ASPIRIN 325 MG
325 TABLET ORAL 2 TIMES DAILY
Status: DISCONTINUED | OUTPATIENT
Start: 2019-12-11 | End: 2019-12-10

## 2019-12-10 RX ORDER — CELECOXIB 200 MG/1
200 CAPSULE ORAL ONCE
Status: DISCONTINUED | OUTPATIENT
Start: 2019-12-10 | End: 2019-12-10 | Stop reason: SDUPTHER

## 2019-12-10 RX ORDER — PROPOFOL 10 MG/ML
INJECTION, EMULSION INTRAVENOUS AS NEEDED
Status: DISCONTINUED | OUTPATIENT
Start: 2019-12-10 | End: 2019-12-10 | Stop reason: HOSPADM

## 2019-12-10 RX ORDER — HEPARIN SODIUM 1000 [USP'U]/ML
INJECTION, SOLUTION INTRAVENOUS; SUBCUTANEOUS AS NEEDED
Status: DISCONTINUED | OUTPATIENT
Start: 2019-12-10 | End: 2019-12-10 | Stop reason: HOSPADM

## 2019-12-10 RX ORDER — LOVASTATIN 20 MG/1
20 TABLET ORAL
Status: DISCONTINUED | OUTPATIENT
Start: 2019-12-10 | End: 2019-12-10 | Stop reason: CLARIF

## 2019-12-10 RX ORDER — EPHEDRINE SULFATE/0.9% NACL/PF 50 MG/5 ML
SYRINGE (ML) INTRAVENOUS AS NEEDED
Status: DISCONTINUED | OUTPATIENT
Start: 2019-12-10 | End: 2019-12-10 | Stop reason: HOSPADM

## 2019-12-10 RX ORDER — KETOTIFEN FUMARATE 0.35 MG/ML
1 SOLUTION/ DROPS OPHTHALMIC DAILY
Status: DISCONTINUED | OUTPATIENT
Start: 2019-12-11 | End: 2019-12-11 | Stop reason: HOSPADM

## 2019-12-10 RX ORDER — SODIUM CHLORIDE 0.9 % (FLUSH) 0.9 %
5-40 SYRINGE (ML) INJECTION EVERY 8 HOURS
Status: DISCONTINUED | OUTPATIENT
Start: 2019-12-10 | End: 2019-12-11 | Stop reason: HOSPADM

## 2019-12-10 RX ORDER — LIDOCAINE HYDROCHLORIDE 20 MG/ML
INJECTION, SOLUTION EPIDURAL; INFILTRATION; INTRACAUDAL; PERINEURAL AS NEEDED
Status: DISCONTINUED | OUTPATIENT
Start: 2019-12-10 | End: 2019-12-10 | Stop reason: HOSPADM

## 2019-12-10 RX ORDER — MORPHINE SULFATE 10 MG/ML
2 INJECTION, SOLUTION INTRAMUSCULAR; INTRAVENOUS
Status: DISCONTINUED | OUTPATIENT
Start: 2019-12-10 | End: 2019-12-10 | Stop reason: HOSPADM

## 2019-12-10 RX ORDER — ADHESIVE BANDAGE
30 BANDAGE TOPICAL DAILY PRN
Status: DISCONTINUED | OUTPATIENT
Start: 2019-12-11 | End: 2019-12-11 | Stop reason: HOSPADM

## 2019-12-10 RX ORDER — SODIUM CHLORIDE 0.9 % (FLUSH) 0.9 %
5-40 SYRINGE (ML) INJECTION AS NEEDED
Status: DISCONTINUED | OUTPATIENT
Start: 2019-12-10 | End: 2019-12-10 | Stop reason: HOSPADM

## 2019-12-10 RX ORDER — NALOXONE HYDROCHLORIDE 0.4 MG/ML
0.4 INJECTION, SOLUTION INTRAMUSCULAR; INTRAVENOUS; SUBCUTANEOUS AS NEEDED
Status: DISCONTINUED | OUTPATIENT
Start: 2019-12-10 | End: 2019-12-11 | Stop reason: HOSPADM

## 2019-12-10 RX ORDER — SODIUM CHLORIDE 9 MG/ML
INJECTION, SOLUTION INTRAVENOUS
Status: DISPENSED
Start: 2019-12-10 | End: 2019-12-10

## 2019-12-10 RX ORDER — PREGABALIN 75 MG/1
75 CAPSULE ORAL ONCE
Status: COMPLETED | OUTPATIENT
Start: 2019-12-10 | End: 2019-12-10

## 2019-12-10 RX ORDER — KETAMINE HYDROCHLORIDE 100 MG/ML
INJECTION, SOLUTION INTRAMUSCULAR; INTRAVENOUS AS NEEDED
Status: DISCONTINUED | OUTPATIENT
Start: 2019-12-10 | End: 2019-12-10 | Stop reason: HOSPADM

## 2019-12-10 RX ORDER — SODIUM CHLORIDE 0.9 % (FLUSH) 0.9 %
5-40 SYRINGE (ML) INJECTION EVERY 8 HOURS
Status: DISCONTINUED | OUTPATIENT
Start: 2019-12-10 | End: 2019-12-10 | Stop reason: HOSPADM

## 2019-12-10 RX ORDER — ROCURONIUM BROMIDE 10 MG/ML
INJECTION, SOLUTION INTRAVENOUS AS NEEDED
Status: DISCONTINUED | OUTPATIENT
Start: 2019-12-10 | End: 2019-12-10 | Stop reason: HOSPADM

## 2019-12-10 RX ORDER — DIPHENHYDRAMINE HCL 12.5MG/5ML
12.5 LIQUID (ML) ORAL
Status: DISCONTINUED | OUTPATIENT
Start: 2019-12-10 | End: 2019-12-11 | Stop reason: HOSPADM

## 2019-12-10 RX ORDER — ACETAMINOPHEN 500 MG
1000 TABLET ORAL ONCE
Status: COMPLETED | OUTPATIENT
Start: 2019-12-10 | End: 2019-12-10

## 2019-12-10 RX ORDER — PRAVASTATIN SODIUM 10 MG/1
20 TABLET ORAL
Status: DISCONTINUED | OUTPATIENT
Start: 2019-12-10 | End: 2019-12-11 | Stop reason: HOSPADM

## 2019-12-10 RX ORDER — ASPIRIN 81 MG/1
81 TABLET ORAL 2 TIMES DAILY
Status: DISCONTINUED | OUTPATIENT
Start: 2019-12-10 | End: 2019-12-11 | Stop reason: HOSPADM

## 2019-12-10 RX ORDER — SODIUM CHLORIDE 9 MG/ML
125 INJECTION, SOLUTION INTRAVENOUS CONTINUOUS
Status: DISCONTINUED | OUTPATIENT
Start: 2019-12-10 | End: 2019-12-11 | Stop reason: HOSPADM

## 2019-12-10 RX ORDER — DEXAMETHASONE SODIUM PHOSPHATE 4 MG/ML
10 INJECTION, SOLUTION INTRA-ARTICULAR; INTRALESIONAL; INTRAMUSCULAR; INTRAVENOUS; SOFT TISSUE ONCE
Status: COMPLETED | OUTPATIENT
Start: 2019-12-11 | End: 2019-12-11

## 2019-12-10 RX ORDER — SODIUM CHLORIDE, SODIUM LACTATE, POTASSIUM CHLORIDE, CALCIUM CHLORIDE 600; 310; 30; 20 MG/100ML; MG/100ML; MG/100ML; MG/100ML
25 INJECTION, SOLUTION INTRAVENOUS CONTINUOUS
Status: DISCONTINUED | OUTPATIENT
Start: 2019-12-10 | End: 2019-12-10 | Stop reason: HOSPADM

## 2019-12-10 RX ORDER — CELECOXIB 200 MG/1
200 CAPSULE ORAL ONCE
Status: DISCONTINUED | OUTPATIENT
Start: 2019-12-10 | End: 2019-12-10 | Stop reason: HOSPADM

## 2019-12-10 RX ORDER — ONDANSETRON 2 MG/ML
4 INJECTION INTRAMUSCULAR; INTRAVENOUS
Status: DISCONTINUED | OUTPATIENT
Start: 2019-12-10 | End: 2019-12-11 | Stop reason: HOSPADM

## 2019-12-10 RX ORDER — TRANEXAMIC ACID 100 MG/ML
INJECTION, SOLUTION INTRAVENOUS
Status: COMPLETED
Start: 2019-12-10 | End: 2019-12-10

## 2019-12-10 RX ORDER — OXYCODONE HYDROCHLORIDE 5 MG/1
5 TABLET ORAL
Status: DISCONTINUED | OUTPATIENT
Start: 2019-12-10 | End: 2019-12-11 | Stop reason: HOSPADM

## 2019-12-10 RX ORDER — ACETAMINOPHEN 325 MG/1
650 TABLET ORAL EVERY 6 HOURS
Status: DISCONTINUED | OUTPATIENT
Start: 2019-12-10 | End: 2019-12-11 | Stop reason: HOSPADM

## 2019-12-10 RX ORDER — KETOROLAC TROMETHAMINE 30 MG/ML
15 INJECTION, SOLUTION INTRAMUSCULAR; INTRAVENOUS EVERY 6 HOURS
Status: DISCONTINUED | OUTPATIENT
Start: 2019-12-10 | End: 2019-12-10

## 2019-12-10 RX ORDER — SODIUM CHLORIDE, SODIUM LACTATE, POTASSIUM CHLORIDE, CALCIUM CHLORIDE 600; 310; 30; 20 MG/100ML; MG/100ML; MG/100ML; MG/100ML
25 INJECTION, SOLUTION INTRAVENOUS CONTINUOUS
Status: DISCONTINUED | OUTPATIENT
Start: 2019-12-10 | End: 2019-12-10

## 2019-12-10 RX ORDER — FENTANYL CITRATE 50 UG/ML
INJECTION, SOLUTION INTRAMUSCULAR; INTRAVENOUS AS NEEDED
Status: DISCONTINUED | OUTPATIENT
Start: 2019-12-10 | End: 2019-12-10 | Stop reason: HOSPADM

## 2019-12-10 RX ORDER — DEXAMETHASONE SODIUM PHOSPHATE 4 MG/ML
INJECTION, SOLUTION INTRA-ARTICULAR; INTRALESIONAL; INTRAMUSCULAR; INTRAVENOUS; SOFT TISSUE AS NEEDED
Status: DISCONTINUED | OUTPATIENT
Start: 2019-12-10 | End: 2019-12-10 | Stop reason: HOSPADM

## 2019-12-10 RX ORDER — OXYCODONE HYDROCHLORIDE 5 MG/1
10 TABLET ORAL
Status: DISCONTINUED | OUTPATIENT
Start: 2019-12-10 | End: 2019-12-11 | Stop reason: HOSPADM

## 2019-12-10 RX ORDER — ONDANSETRON 2 MG/ML
INJECTION INTRAMUSCULAR; INTRAVENOUS AS NEEDED
Status: DISCONTINUED | OUTPATIENT
Start: 2019-12-10 | End: 2019-12-10 | Stop reason: HOSPADM

## 2019-12-10 RX ORDER — ESMOLOL HYDROCHLORIDE 10 MG/ML
INJECTION INTRAVENOUS AS NEEDED
Status: DISCONTINUED | OUTPATIENT
Start: 2019-12-10 | End: 2019-12-10 | Stop reason: HOSPADM

## 2019-12-10 RX ORDER — SODIUM CHLORIDE 0.9 % (FLUSH) 0.9 %
5-40 SYRINGE (ML) INJECTION AS NEEDED
Status: DISCONTINUED | OUTPATIENT
Start: 2019-12-10 | End: 2019-12-11 | Stop reason: HOSPADM

## 2019-12-10 RX ORDER — PREGABALIN 75 MG/1
75 CAPSULE ORAL ONCE
Status: DISCONTINUED | OUTPATIENT
Start: 2019-12-10 | End: 2019-12-10 | Stop reason: SDUPTHER

## 2019-12-10 RX ORDER — SUCCINYLCHOLINE CHLORIDE 20 MG/ML
INJECTION INTRAMUSCULAR; INTRAVENOUS AS NEEDED
Status: DISCONTINUED | OUTPATIENT
Start: 2019-12-10 | End: 2019-12-10 | Stop reason: HOSPADM

## 2019-12-10 RX ORDER — CITALOPRAM 20 MG/1
10 TABLET, FILM COATED ORAL
Status: DISCONTINUED | OUTPATIENT
Start: 2019-12-10 | End: 2019-12-11 | Stop reason: HOSPADM

## 2019-12-10 RX ORDER — PROPOFOL 10 MG/ML
INJECTION, EMULSION INTRAVENOUS
Status: DISCONTINUED | OUTPATIENT
Start: 2019-12-10 | End: 2019-12-10 | Stop reason: HOSPADM

## 2019-12-10 RX ORDER — MORPHINE SULFATE 2 MG/ML
1 INJECTION, SOLUTION INTRAMUSCULAR; INTRAVENOUS
Status: DISCONTINUED | OUTPATIENT
Start: 2019-12-10 | End: 2019-12-11 | Stop reason: HOSPADM

## 2019-12-10 RX ORDER — HEPARIN SODIUM 1000 [USP'U]/ML
1000 INJECTION, SOLUTION INTRAVENOUS; SUBCUTANEOUS ONCE
Status: DISCONTINUED | OUTPATIENT
Start: 2019-12-10 | End: 2019-12-10 | Stop reason: HOSPADM

## 2019-12-10 RX ORDER — ACETAMINOPHEN 500 MG
1000 TABLET ORAL ONCE
Status: DISCONTINUED | OUTPATIENT
Start: 2019-12-10 | End: 2019-12-10 | Stop reason: SDUPTHER

## 2019-12-10 RX ORDER — LIDOCAINE HYDROCHLORIDE 10 MG/ML
0.1 INJECTION, SOLUTION EPIDURAL; INFILTRATION; INTRACAUDAL; PERINEURAL AS NEEDED
Status: DISCONTINUED | OUTPATIENT
Start: 2019-12-10 | End: 2019-12-10 | Stop reason: HOSPADM

## 2019-12-10 RX ORDER — ONDANSETRON 2 MG/ML
4 INJECTION INTRAMUSCULAR; INTRAVENOUS AS NEEDED
Status: DISCONTINUED | OUTPATIENT
Start: 2019-12-10 | End: 2019-12-10 | Stop reason: HOSPADM

## 2019-12-10 RX ORDER — PHENOL/SODIUM PHENOLATE
20 AEROSOL, SPRAY (ML) MUCOUS MEMBRANE DAILY
Status: DISCONTINUED | OUTPATIENT
Start: 2019-12-11 | End: 2019-12-10 | Stop reason: CLARIF

## 2019-12-10 RX ORDER — AMOXICILLIN 250 MG
1 CAPSULE ORAL 2 TIMES DAILY
Status: DISCONTINUED | OUTPATIENT
Start: 2019-12-10 | End: 2019-12-11 | Stop reason: HOSPADM

## 2019-12-10 RX ORDER — MIDAZOLAM HYDROCHLORIDE 1 MG/ML
INJECTION, SOLUTION INTRAMUSCULAR; INTRAVENOUS AS NEEDED
Status: DISCONTINUED | OUTPATIENT
Start: 2019-12-10 | End: 2019-12-10 | Stop reason: HOSPADM

## 2019-12-10 RX ORDER — FACIAL-BODY WIPES
10 EACH TOPICAL DAILY PRN
Status: DISCONTINUED | OUTPATIENT
Start: 2019-12-12 | End: 2019-12-11 | Stop reason: HOSPADM

## 2019-12-10 RX ORDER — POLYETHYLENE GLYCOL 3350 17 G/17G
17 POWDER, FOR SOLUTION ORAL DAILY
Status: DISCONTINUED | OUTPATIENT
Start: 2019-12-11 | End: 2019-12-11 | Stop reason: HOSPADM

## 2019-12-10 RX ORDER — PANTOPRAZOLE SODIUM 40 MG/1
40 TABLET, DELAYED RELEASE ORAL
Status: DISCONTINUED | OUTPATIENT
Start: 2019-12-11 | End: 2019-12-11 | Stop reason: HOSPADM

## 2019-12-10 RX ORDER — DIPHENHYDRAMINE HYDROCHLORIDE 50 MG/ML
12.5 INJECTION, SOLUTION INTRAMUSCULAR; INTRAVENOUS
Status: DISCONTINUED | OUTPATIENT
Start: 2019-12-10 | End: 2019-12-10 | Stop reason: HOSPADM

## 2019-12-10 RX ADMIN — KETAMINE HYDROCHLORIDE 25 MG: 100 INJECTION, SOLUTION, CONCENTRATE INTRAMUSCULAR; INTRAVENOUS at 10:12

## 2019-12-10 RX ADMIN — SUCCINYLCHOLINE CHLORIDE 120 MG: 20 INJECTION, SOLUTION INTRAMUSCULAR; INTRAVENOUS at 09:52

## 2019-12-10 RX ADMIN — OXYCODONE HYDROCHLORIDE 10 MG: 5 TABLET ORAL at 19:37

## 2019-12-10 RX ADMIN — MIDAZOLAM HYDROCHLORIDE 2 MG: 1 INJECTION INTRAMUSCULAR; INTRAVENOUS at 09:44

## 2019-12-10 RX ADMIN — SODIUM CHLORIDE, POTASSIUM CHLORIDE, SODIUM LACTATE AND CALCIUM CHLORIDE: 600; 310; 30; 20 INJECTION, SOLUTION INTRAVENOUS at 09:30

## 2019-12-10 RX ADMIN — WATER 2 G: 1 INJECTION INTRAMUSCULAR; INTRAVENOUS; SUBCUTANEOUS at 09:55

## 2019-12-10 RX ADMIN — CEFAZOLIN 2 G: 1 INJECTION, POWDER, FOR SOLUTION INTRAMUSCULAR; INTRAVENOUS at 18:14

## 2019-12-10 RX ADMIN — DEXAMETHASONE SODIUM PHOSPHATE 8 MG: 4 INJECTION, SOLUTION INTRAMUSCULAR; INTRAVENOUS at 09:56

## 2019-12-10 RX ADMIN — ROCURONIUM BROMIDE 5 MG: 10 INJECTION INTRAVENOUS at 09:52

## 2019-12-10 RX ADMIN — TRANEXAMIC ACID 1 G: 100 INJECTION, SOLUTION INTRAVENOUS at 10:01

## 2019-12-10 RX ADMIN — ESMOLOL HYDROCHLORIDE 20 MG: 10 INJECTION, SOLUTION INTRAVENOUS at 11:24

## 2019-12-10 RX ADMIN — Medication 10 MG: at 09:57

## 2019-12-10 RX ADMIN — DOCUSATE SODIUM - SENNOSIDES 1 TABLET: 50; 8.6 TABLET, FILM COATED ORAL at 13:02

## 2019-12-10 RX ADMIN — OXYCODONE HYDROCHLORIDE 10 MG: 5 TABLET ORAL at 23:15

## 2019-12-10 RX ADMIN — SODIUM CHLORIDE 125 ML/HR: 900 INJECTION, SOLUTION INTRAVENOUS at 11:34

## 2019-12-10 RX ADMIN — Medication 3 AMPULE: at 08:44

## 2019-12-10 RX ADMIN — HEPARIN SODIUM 1000 UNITS: 1000 INJECTION, SOLUTION INTRAVENOUS; SUBCUTANEOUS at 10:04

## 2019-12-10 RX ADMIN — ACETAMINOPHEN 1000 MG: 500 TABLET ORAL at 08:43

## 2019-12-10 RX ADMIN — SODIUM CHLORIDE, POTASSIUM CHLORIDE, SODIUM LACTATE AND CALCIUM CHLORIDE: 600; 310; 30; 20 INJECTION, SOLUTION INTRAVENOUS at 10:53

## 2019-12-10 RX ADMIN — PROPOFOL 40 MG: 10 INJECTION, EMULSION INTRAVENOUS at 10:13

## 2019-12-10 RX ADMIN — OXYCODONE HYDROCHLORIDE 10 MG: 5 TABLET ORAL at 16:04

## 2019-12-10 RX ADMIN — Medication 10 ML: at 13:03

## 2019-12-10 RX ADMIN — PROPOFOL 120 MG: 10 INJECTION, EMULSION INTRAVENOUS at 09:52

## 2019-12-10 RX ADMIN — ONDANSETRON HYDROCHLORIDE 4 MG: 2 INJECTION, SOLUTION INTRAMUSCULAR; INTRAVENOUS at 10:06

## 2019-12-10 RX ADMIN — ACETAMINOPHEN 650 MG: 325 TABLET ORAL at 13:03

## 2019-12-10 RX ADMIN — PREGABALIN 75 MG: 75 CAPSULE ORAL at 08:43

## 2019-12-10 RX ADMIN — ACETAMINOPHEN 650 MG: 325 TABLET ORAL at 23:15

## 2019-12-10 RX ADMIN — CITALOPRAM HYDROBROMIDE 10 MG: 20 TABLET ORAL at 23:16

## 2019-12-10 RX ADMIN — FENTANYL CITRATE 100 MCG: 50 INJECTION, SOLUTION INTRAMUSCULAR; INTRAVENOUS at 09:52

## 2019-12-10 RX ADMIN — PROPOFOL 75 MCG/KG/MIN: 10 INJECTION, EMULSION INTRAVENOUS at 09:54

## 2019-12-10 RX ADMIN — PRAVASTATIN SODIUM 20 MG: 10 TABLET ORAL at 23:15

## 2019-12-10 RX ADMIN — DOCUSATE SODIUM - SENNOSIDES 1 TABLET: 50; 8.6 TABLET, FILM COATED ORAL at 18:13

## 2019-12-10 RX ADMIN — OXYCODONE HYDROCHLORIDE 10 MG: 5 TABLET ORAL at 13:02

## 2019-12-10 RX ADMIN — TRANEXAMIC ACID 1000 MG: 100 INJECTION, SOLUTION INTRAVENOUS at 11:51

## 2019-12-10 RX ADMIN — Medication 10 MG: at 10:50

## 2019-12-10 RX ADMIN — LIDOCAINE HYDROCHLORIDE 60 MG: 20 INJECTION, SOLUTION EPIDURAL; INFILTRATION; INTRACAUDAL; PERINEURAL at 09:52

## 2019-12-10 RX ADMIN — ASPIRIN 81 MG: 81 TABLET, COATED ORAL at 19:37

## 2019-12-10 RX ADMIN — ACETAMINOPHEN 650 MG: 325 TABLET ORAL at 18:13

## 2019-12-10 RX ADMIN — Medication 10 ML: at 23:16

## 2019-12-10 RX ADMIN — SODIUM CHLORIDE 125 ML/HR: 900 INJECTION, SOLUTION INTRAVENOUS at 19:57

## 2019-12-10 NOTE — PROGRESS NOTES
Problem: Mobility Impaired (Adult and Pediatric)  Goal: *Acute Goals and Plan of Care (Insert Text)  Description  FUNCTIONAL STATUS PRIOR TO ADMISSION: Patient was independent and active without use of DME.    HOME SUPPORT PRIOR TO ADMISSION: The patient lived with spouse but did not require assist.    Physical Therapy Goals  Initiated 12/10/2019    1. Patient will move from supine to sit and sit to supine , scoot up and down and roll side to side in bed with independence within 4 days. 2. Patient will perform sit to stand with modified independence within 4 days. 3. Patient will ambulate with modified independence for 350 feet with the least restrictive device within 4 days. 4. Patient will ascend/descend 4 stairs with Left handrail(s) with supervision/set-up within 4 days. 5. Patient will perform home exercise program per protocol with supervision/set-up within 4 days. 6. Patient will demonstrate AROM 0-90 degrees in operative joint within 4 days. Outcome: Not Met   PHYSICAL THERAPY EVALUATION  Patient: Sarthak Rowan (20 y.o. female)  Date: 12/10/2019  Primary Diagnosis: Osteoarthrosis, localized, primary, knee, left [M17.12]  Procedure(s) (LRB):  LEFT TOTAL KNEE REPLACEMENT (Left) Day of Surgery   Precautions:  Fall, WBAT(LLE)      ASSESSMENT  Based on the objective data described below, the patient presents with expected increased L knee pain, decreased strength/rom and decreased mobility skills following L TKR today. She did well with the TKR exercises improving her overall ROM from -20 to -8 extension to 75 degrees flexion by the end of the exercises. She was only able to mobilize to standing briefly due to hypotension change from supine to sitting with increased lightheadedness reported. Once supine her BP returned to baseline and lightheadedness stopped. Per the patient, she was unable to fully extend knee prior to surgery.  PT and patient will need to work hard to regain full extension - patient verbalized understanding. Placed towel roll under L ankle to increase L knee extension and locked knee control on bed to maintain full extension. Ice pack to L knee. Vitals:    12/10/19 1430 12/10/19 1445 12/10/19 1451 12/10/19 1530   BP: 131/57 (!) 87/47 131/57 123/64   BP 1 Location: Right arm Right arm Right arm    BP Patient Position: At rest Sitting Post activity    Pulse: 63 63 66 75   Resp: 16   18   Temp: 98.5 °F (36.9 °C)   97.6 °F (36.4 °C)   SpO2: 96% 96% 97% 97%   Weight:       Height:            Current Level of Function Impacting Discharge (mobility/balance): min a x 2    Functional Outcome Measure: The patient scored 50/100 on the Barthel outcome measure which is indicative of 50% functional impairment. Other factors to consider for discharge: independent PLOF; good home support; post op orthostatic hypotension     Patient will benefit from skilled therapy intervention to address the above noted impairments. PLAN :  Recommendations and Planned Interventions: bed mobility training, transfer training, gait training, therapeutic exercises, modalities, edema management/control, patient and family training/education and therapeutic activities      Frequency/Duration: Patient will be followed by physical therapy:  twice daily to address goals. Recommendation for discharge: (in order for the patient to meet his/her long term goals)  Physical therapy at least 2 days/week in the home     This discharge recommendation:  Has been made in collaboration with the attending provider and/or case management    IF patient discharges home will need the following DME: patient owns DME required for discharge         SUBJECTIVE:   Patient stated  I'll do better tomorrow.     OBJECTIVE DATA SUMMARY:   HISTORY:    Past Medical History:   Diagnosis Date    Arrhythmia     ? Palak Ek    Colon polyps     Dr Ryan Brady    Diverticulitis     GERD (gastroesophageal reflux disease)     OCCASIONALLY    Hypertension     Ill-defined condition     stomach ulcer    Nausea & vomiting     Other ill-defined conditions(799.89)     HI CHOLESTEROL    Other ill-defined conditions(799.89)     DIVERTICULOSIS (HX: DIVERTICULITIS)    Psychiatric disorder     DEPRESSION     Past Surgical History:   Procedure Laterality Date    ABDOMEN SURGERY PROC UNLISTED      LYSIS OF ADHESIONS    COLONOSCOPY N/A 11/13/2019    COLONOSCOPY performed by Maia Gibbons MD at [facility] ENDOSCOPY    HX APPENDECTOMY      HX CATARACT REMOVAL      BILAT WITH IOL with bilateral lens implants    HX HYSTERECTOMY      HX KNEE ARTHROSCOPY      RIGHT KNEE     HX MOHS PROCEDURES      RIGHT SHOULDER--ROTATOR CUFF    HX OOPHORECTOMY      HX ORTHOPAEDIC  6/2009    RIGHT TKR    HX ORTHOPAEDIC Right     shoulder - torn rotatory cuff and bone spur    HX TUBAL LIGATION      HX UROLOGICAL      bladder sling    DE COLONOSCOPY FLX DX W/COLLJ SPEC WHEN PFRMD  6/25/2012            Personal factors and/or comorbidities impacting plan of care: none    Home Situation  Home Environment: Private residence  # Steps to Enter: 2  Rails to Enter: Yes  Hand Rails : Left  Wheelchair Ramp: No  One/Two Story Residence: One story  Living Alone: No  Support Systems: Spouse/Significant Other/Partner, Family member(s), Child(jalyn)  Patient Expects to be Discharged to[de-identified] Private residence  Current DME Used/Available at Home: Cane, straight, Blood pressure cuff, Walker, rolling  Tub or Shower Type: Tub/Shower combination    EXAMINATION/PRESENTATION/DECISION MAKING:   Critical Behavior:  Neurologic State: Drowsy  Orientation Level: Oriented X4  Cognition: Decreased attention/concentration, Follows commands  Safety/Judgement: Awareness of environment, Good awareness of safety precautions, Fall prevention(POD#0)  Hearing:     Skin:    Edema: moderate L knee edema  Range Of Motion:  AROM: Generally decreased, functional           PROM: Generally decreased, functional(-6 to 75 degrees)           Strength:    Strength: Generally decreased, functional(L ankle 3/5; L knee 3-/5; L hip 3/5)                    Tone & Sensation:   Tone: Normal              Sensation: Intact               Coordination:  Coordination: Within functional limits  Vision:   Acuity: Within Defined Limits  Functional Mobility:  Bed Mobility:  Rolling: Contact guard assistance  Supine to Sit: Minimum assistance  Sit to Supine: Minimum assistance  Scooting: Stand-by assistance  Transfers:  Sit to Stand: Minimum assistance;Assist x2  Stand to Sit: Minimum assistance;Assist x1                       Balance:   Sitting: Intact  Standing: Impaired  Standing - Static: Good;Constant support  Standing - Dynamic : Fair;Constant support  Ambulation/Gait Training:              Gait Description (WDL): (deferred due to hypotension)     Right Side Weight Bearing: Full  Left Side Weight Bearing: As tolerated                                Therapeutic Exercises: Ankle pumps, quad sets, heel slides. All x 10 reps each side. Functional Measure:  Barthel Index:    Bathin  Bladder: 10(has not voided yet)  Bowels: 10  Groomin  Dressin  Feeding: 10  Mobility: 0  Stairs: 0  Toilet Use: 5  Transfer (Bed to Chair and Back): 5  Total: 50/100       The Barthel ADL Index: Guidelines  1. The index should be used as a record of what a patient does, not as a record of what a patient could do. 2. The main aim is to establish degree of independence from any help, physical or verbal, however minor and for whatever reason. 3. The need for supervision renders the patient not independent. 4. A patient's performance should be established using the best available evidence. Asking the patient, friends/relatives and nurses are the usual sources, but direct observation and common sense are also important. However direct testing is not needed.   5. Usually the patient's performance over the preceding 24-48 hours is important, but occasionally longer periods will be relevant. 6. Middle categories imply that the patient supplies over 50 per cent of the effort. 7. Use of aids to be independent is allowed. Shady Greer., Barthel, D.W. (0841). Functional evaluation: the Barthel Index. [facility] (14)2. MADAN Jeronimo, Benson Stevenbrandi., Rheta People., [place], [address] [place] [address] (). Measuring the change indisability after inpatient rehabilitation; comparison of the responsiveness of the Barthel Index and Functional Green Measure. Journal of Neurology, Neurosurgery, and Psychiatry, 66(4), 547-289. Nirmal Browning, N.J.A, DHARMESH Leahy, & Monroe Hameed MJOE. (2004.) Assessment of post-stroke quality of life in cost-effectiveness studies: The usefulness of the Barthel Index and the EuroQoL-5D. Quality of Life Research, 15, 802-58            Physical Therapy Evaluation Charge Determination   History Examination Presentation Decision-Making   MEDIUM  Complexity : 1-2 comorbidities / personal factors will impact the outcome/ POC  HIGH Complexity : 4+ Standardized tests and measures addressing body structure, function, activity limitation and / or participation in recreation  HIGH Complexity : Unstable and unpredictable characteristics  Other outcome measures Barthel  MEDIUM      Based on the above components, the patient evaluation is determined to be of the following complexity level: MEDIUM    Pain Ratin/10 L knee post PT session    Activity Tolerance:   Fair, SpO2 stable on RA and signs and symptoms of orthostatic hypotension  Please refer to the flowsheet for vital signs taken during this treatment. After treatment patient left in no apparent distress:   Supine in bed, Heels elevated for pressure relief, Call bell within reach and Caregiver / family present    COMMUNICATION/EDUCATION:   The patients plan of care was discussed with: Registered Nurse, Rehabilitation Attendant and NP.     Fall prevention education was provided and the patient/caregiver indicated understanding., Patient/family have participated as able in goal setting and plan of care. and Patient/family agree to work toward stated goals and plan of care.     Thank you for this referral.  Lupillo Clement, PT   Time Calculation: 31 mins

## 2019-12-10 NOTE — PROGRESS NOTES
ASHLEY: home with home health and family assistance. Family to provide transportation at d/c.  1) Patient cleared for d/c from CM standpoint    Reason for Admission: left total knee replacement                      RRAT Score: 11                  Plan for utilizing home health:  [facility]                        Current Advanced Directive/Advance Care Plan: not at this time                          Transition of Care Plan:                      Patient is a 77 y/o female who was admitted to [facility] for a planned left total knee replacement. CM made room visit with patient who was alert and oriented with  and daughter at bedside. Patient confirmed demographics, insurance, and emergency contact on file. Patient's PCP is Dr. Ricky Menchaca and was last seen 11/26/19. Patient uses [address] in [place]. Patient and spouse reside in a single level home with 2 entry steps. Patient reported that her son lives next door and her daughter lives 3 miles down the road. Patient  also added that they have a lot of additional family members nearby as well. Patient has DME including RW and cane. Prior to admission patient was independent with ALDs, IADLs (painful) and driving. Patient has used [facility] in the past but unsure of agency used. Patient denied any history of SNF or IPR. Patient's plan is to return home with home health. Patient stated that she has no preference for home health. CM encouraged patient to choose a [place] agency and patient consistently stated that she has no preference. Patient's daughter and  in room also would not choose a home health agency and requested a referral just be sent out to who ever goes to their area. Patient agreeable for a referral to be sent to [facility] and at home care. FOC signed and on bedside chart. Referral sent to [facility] and they accepted. AVS updated. Care Management Interventions  PCP Verified by CM: Yes  Last Visit to PCP: 11/26/19  Mode of Transport at Discharge:  Other (see comment)  Transition of Care Consult (CM Consult): Discharge Planning, [address]: Yes  Discharge Durable Medical Equipment: No  Physical Therapy Consult: Yes  Occupational Therapy Consult: No  Speech Therapy Consult: No  Current Support Network: Lives with Spouse, Own Home, Family Lives Nearby  Confirm Follow Up Transport: Family  Plan discussed with Pt/Family/Caregiver: Yes  Freedom of Choice Offered: Yes  Discharge Location  Discharge Placement: Home with home health    Mauricio Anderson, [address]

## 2019-12-10 NOTE — PERIOP NOTES
Handoff Report from Operating Room to PACU    Report received from Santosh Swenson RN and Joanne Mota CRNA regarding Author Getting. Surgeon(s):  Holly Stoner MD  And Procedure(s) (LRB):  LEFT TOTAL KNEE REPLACEMENT (Left)  confirmed   with dressings discussed. Anesthesia type, drugs, patient history, complications, estimated blood loss, vital signs, intake and output, and last pain medication, lines and  were reviewed.

## 2019-12-10 NOTE — PERIOP NOTES
TRANSFER - OUT REPORT:    Verbal report given to Lionel RN(name) on [employer]  being transferred to [facility](unit) for routine post - op       Report consisted of patients Situation, Background, Assessment and   Recommendations(SBAR). Information from the following report(s) OR Summary, Procedure Summary, Intake/Output and MAR was reviewed with the receiving nurse. Opportunity for questions and clarification was provided.       Patient transported with:   O2 @ 2 liters  Tech

## 2019-12-10 NOTE — ANESTHESIA POSTPROCEDURE EVALUATION
Procedure(s):  LEFT TOTAL KNEE REPLACEMENT. general    Anesthesia Post Evaluation        Patient location during evaluation: PACU  Note status: Adequate. Level of consciousness: responsive to verbal stimuli and sleepy but conscious  Pain management: satisfactory to patient  Airway patency: patent  Anesthetic complications: no  Cardiovascular status: acceptable  Respiratory status: acceptable  Hydration status: acceptable  Comments: +Post-Anesthesia Evaluation and Assessment    Patient: Cari Reyes MRN: [id number]  SSN: [id number]   YOB: 1940  Age: 78 y.o. Sex: female      Cardiovascular Function/Vital Signs    /55   Pulse 92   Temp 37.2 °C (99 °F)   Resp 20   Ht 5' 6\" (1.676 m)   Wt 68.3 kg (150 lb 9.2 oz)   SpO2 97%   BMI 24.30 kg/m²     Patient is status post Procedure(s):  LEFT TOTAL KNEE REPLACEMENT. Nausea/Vomiting: Controlled. Postoperative hydration reviewed and adequate. Pain:  Pain Scale 1: Numeric (0 - 10) (12/10/19 1145)  Pain Intensity 1: 3 (12/10/19 1145)   Managed. Neurological Status:   Neuro (WDL): Exceptions to WDL (12/10/19 1129)   At baseline. Mental Status and Level of Consciousness: Arousable. Pulmonary Status:   O2 Device: Nasal cannula (12/10/19 1129)   Adequate oxygenation and airway patent. Complications related to anesthesia: None    Post-anesthesia assessment completed. No concerns. Signed By: Madyson Wolfe MD    12/10/2019  Post anesthesia nausea and vomiting:  controlled      Vitals Value Taken Time   /52 12/10/2019 12:00 PM   Temp 37.2 °C (99 °F) 12/10/2019 11:29 AM   Pulse 89 12/10/2019 12:01 PM   Resp 17 12/10/2019 12:01 PM   SpO2 98 % 12/10/2019 12:01 PM   Vitals shown include unvalidated device data.

## 2019-12-10 NOTE — BRIEF OP NOTE
BRIEF OPERATIVE NOTE    Date of Procedure: 12/10/2019   Preoperative Diagnosis: LEFT KNEE OA  Postoperative Diagnosis: * No post-op diagnosis entered *    Procedure(s):  LEFT TOTAL KNEE REPLACEMENT (surg pain sol)  Surgeon(s) and Role:     Hortencia Ceballos MD - Primary         Surgical Assistant: 0    Surgical Staff:  Circ-1: Ama Jeffers RN  Scrub Tech-1: Gene Marcano  Surg Asst-1: Reynaldo Beatty  No case tracking events are documented in the log.   Anesthesia: General   Estimated Blood Loss: 100  Specimens: * No specimens in log *   Findings: 0   Complications: 0  Implants: * No implants in log *

## 2019-12-10 NOTE — ANESTHESIA PREPROCEDURE EVALUATION
Relevant Problems   No relevant active problems       Anesthetic History     PONV          Review of Systems / Medical History  Patient summary reviewed, nursing notes reviewed and pertinent labs reviewed    Pulmonary  Within defined limits                 Neuro/Psych         Psychiatric history    Comments: Depression Cardiovascular    Hypertension: well controlled        Dysrhythmias : PVC  Hyperlipidemia    Exercise tolerance: >4 METS     GI/Hepatic/Renal     GERD: well controlled      PUD     Endo/Other        Arthritis     Other Findings            Physical Exam    Airway  Mallampati: II  TM Distance: 4 - 6 cm  Neck ROM: normal range of motion   Mouth opening: Normal     Cardiovascular  Regular rate and rhythm,  S1 and S2 normal,  no murmur, click, rub, or gallop  Rhythm: regular  Rate: normal         Dental    Dentition: Full upper dentures and Full lower dentures     Pulmonary  Breath sounds clear to auscultation               Abdominal  GI exam deferred       Other Findings            Anesthetic Plan    ASA: 2  Anesthesia type: general    Monitoring Plan: BIS      Induction: Intravenous  Anesthetic plan and risks discussed with: Patient      Previous conventional grade 2 view here.     Consider TIVA

## 2019-12-10 NOTE — ROUTINE PROCESS
Patient: Author Bernard MRN: [id number]  SSN: [id number] YOB: 1940  Age: 78 y.o. Sex: female Patient is status post Procedure(s): LEFT TOTAL KNEE REPLACEMENT (surg pain sol). Surgeon(s) and Role: Jeb Panchal MD - Primary Local/Dose/Irrigation:  100mL of surgical pain solution. Peripheral IV 12/10/19 Left Wrist (Active) Site Assessment Ecchymotic (bruised); Clean, dry, & intact 12/10/2019  8:42 AM  
Phlebitis Assessment 0 12/10/2019  8:42 AM  
Infiltration Assessment 0 12/10/2019  8:42 AM  
Dressing Status Clean, dry, & intact 12/10/2019  8:42 AM  
Dressing Type Tape;Transparent 12/10/2019  8:42 AM  
Hub Color/Line Status Green; Infusing;Patent 12/10/2019  8:42 AM  
        
Airway - Endotracheal Tube 12/10/19 Oral (Active) Dressing/Packing:      
Splint/Cast:  ] Other:  1 site Left Kneww with Staples, adaptic, 4x4's, abd pad, cast padding, and Ace

## 2019-12-11 VITALS
HEIGHT: 66 IN | SYSTOLIC BLOOD PRESSURE: 127 MMHG | RESPIRATION RATE: 16 BRPM | TEMPERATURE: 98.3 F | DIASTOLIC BLOOD PRESSURE: 47 MMHG | OXYGEN SATURATION: 93 % | HEART RATE: 65 BPM | WEIGHT: 150.57 LBS | BODY MASS INDEX: 24.2 KG/M2

## 2019-12-11 LAB
ANION GAP SERPL CALC-SCNC: 5 MMOL/L (ref 5–15)
BUN SERPL-MCNC: 15 MG/DL (ref 6–20)
BUN/CREAT SERPL: 18 (ref 12–20)
CALCIUM SERPL-MCNC: 8.2 MG/DL (ref 8.5–10.1)
CHLORIDE SERPL-SCNC: 108 MMOL/L (ref 97–108)
CO2 SERPL-SCNC: 27 MMOL/L (ref 21–32)
CREAT SERPL-MCNC: 0.83 MG/DL (ref 0.55–1.02)
GLUCOSE SERPL-MCNC: 95 MG/DL (ref 65–100)
HGB BLD-MCNC: 7.9 G/DL (ref 11.5–16)
POTASSIUM SERPL-SCNC: 4 MMOL/L (ref 3.5–5.1)
SODIUM SERPL-SCNC: 140 MMOL/L (ref 136–145)

## 2019-12-11 PROCEDURE — 36415 COLL VENOUS BLD VENIPUNCTURE: CPT

## 2019-12-11 PROCEDURE — 97530 THERAPEUTIC ACTIVITIES: CPT

## 2019-12-11 PROCEDURE — 74011250637 HC RX REV CODE- 250/637: Performed by: ORTHOPAEDIC SURGERY

## 2019-12-11 PROCEDURE — 85018 HEMOGLOBIN: CPT

## 2019-12-11 PROCEDURE — 74011250636 HC RX REV CODE- 250/636: Performed by: ORTHOPAEDIC SURGERY

## 2019-12-11 PROCEDURE — 74011000250 HC RX REV CODE- 250: Performed by: ORTHOPAEDIC SURGERY

## 2019-12-11 PROCEDURE — 97116 GAIT TRAINING THERAPY: CPT

## 2019-12-11 PROCEDURE — 74011250637 HC RX REV CODE- 250/637: Performed by: PHYSICIAN ASSISTANT

## 2019-12-11 PROCEDURE — 80048 BASIC METABOLIC PNL TOTAL CA: CPT

## 2019-12-11 PROCEDURE — 97110 THERAPEUTIC EXERCISES: CPT

## 2019-12-11 PROCEDURE — 94760 N-INVAS EAR/PLS OXIMETRY 1: CPT

## 2019-12-11 RX ORDER — AMOXICILLIN 250 MG
1 CAPSULE ORAL DAILY
Qty: 30 TAB | Refills: 0 | Status: SHIPPED | OUTPATIENT
Start: 2019-12-11

## 2019-12-11 RX ORDER — POLYETHYLENE GLYCOL 3350 17 G/17G
17 POWDER, FOR SOLUTION ORAL
Qty: 15 PACKET | Refills: 0 | Status: SHIPPED | OUTPATIENT
Start: 2019-12-11 | End: 2019-12-26

## 2019-12-11 RX ORDER — OXYCODONE HYDROCHLORIDE 5 MG/1
5-10 TABLET ORAL
Qty: 60 TAB | Refills: 0 | Status: SHIPPED | OUTPATIENT
Start: 2019-12-11 | End: 2019-12-25

## 2019-12-11 RX ORDER — ACETAMINOPHEN 325 MG/1
650 TABLET ORAL EVERY 6 HOURS
Qty: 112 TAB | Refills: 0 | Status: SHIPPED
Start: 2019-12-11 | End: 2019-12-25

## 2019-12-11 RX ORDER — ASPIRIN 81 MG/1
81 TABLET ORAL 2 TIMES DAILY
Qty: 60 TAB | Refills: 0 | Status: SHIPPED | OUTPATIENT
Start: 2019-12-11 | End: 2020-01-10

## 2019-12-11 RX ADMIN — DOCUSATE SODIUM - SENNOSIDES 1 TABLET: 50; 8.6 TABLET, FILM COATED ORAL at 09:05

## 2019-12-11 RX ADMIN — CEFAZOLIN 2 G: 1 INJECTION, POWDER, FOR SOLUTION INTRAMUSCULAR; INTRAVENOUS at 01:30

## 2019-12-11 RX ADMIN — DEXAMETHASONE SODIUM PHOSPHATE 10 MG: 4 INJECTION, SOLUTION INTRAMUSCULAR; INTRAVENOUS at 09:05

## 2019-12-11 RX ADMIN — POLYETHYLENE GLYCOL (3350) 17 G: 17 POWDER, FOR SOLUTION ORAL at 09:04

## 2019-12-11 RX ADMIN — ACETAMINOPHEN 650 MG: 325 TABLET ORAL at 06:25

## 2019-12-11 RX ADMIN — ASPIRIN 81 MG: 81 TABLET, COATED ORAL at 09:05

## 2019-12-11 RX ADMIN — OXYCODONE HYDROCHLORIDE 10 MG: 5 TABLET ORAL at 02:13

## 2019-12-11 RX ADMIN — MORPHINE SULFATE 1 MG: 2 INJECTION, SOLUTION INTRAMUSCULAR; INTRAVENOUS at 04:07

## 2019-12-11 RX ADMIN — OXYCODONE HYDROCHLORIDE 10 MG: 5 TABLET ORAL at 10:01

## 2019-12-11 RX ADMIN — OXYCODONE HYDROCHLORIDE 10 MG: 5 TABLET ORAL at 06:26

## 2019-12-11 RX ADMIN — Medication 10 ML: at 06:28

## 2019-12-11 RX ADMIN — PANTOPRAZOLE SODIUM 40 MG: 40 TABLET, DELAYED RELEASE ORAL at 06:26

## 2019-12-11 NOTE — PROGRESS NOTES
Problem: Mobility Impaired (Adult and Pediatric)  Goal: *Acute Goals and Plan of Care (Insert Text)  Description  FUNCTIONAL STATUS PRIOR TO ADMISSION: Patient was independent and active without use of DME.    HOME SUPPORT PRIOR TO ADMISSION: The patient lived with spouse but did not require assist.    Physical Therapy Goals  Initiated 12/10/2019    1. Patient will move from supine to sit and sit to supine , scoot up and down and roll side to side in bed with independence within 4 days. 2. Patient will perform sit to stand with modified independence within 4 days. 3. Patient will ambulate with modified independence for 350 feet with the least restrictive device within 4 days. 4. Patient will ascend/descend 4 stairs with Left handrail(s) with supervision/set-up within 4 days. 5. Patient will perform home exercise program per protocol with supervision/set-up within 4 days. 6. Patient will demonstrate AROM 0-90 degrees in operative joint within 4 days. Note:   PHYSICAL THERAPY TREATMENT  Patient: Julia Dickens (60 y.o. female)  Date: 12/11/2019  Diagnosis: Osteoarthrosis, localized, primary, knee, left [M17.12]     Procedure(s) (LRB):  LEFT TOTAL KNEE REPLACEMENT (Left) 1 Day Post-Op    Precautions: Fall, WBAT(LLE)  Chart, physical therapy assessment, plan of care and goals were reviewed. ASSESSMENT  Based on the objective data described below, pt tolerated tx well, no LOB or SOB, did well with stair trng and car transfer, good motivation, did well with bed mob and transfers, vc's for safety and proper RW use, from PT standpoint pt is ready for d/c. Current Level of Function Impacting Discharge (mobility/balance): Stand-by assistance/CGA x1         PLAN :  Patient continues to benefit from skilled intervention to address the above impairments. Continue treatment per established plan of care. to address goals.     Recommendation for discharge: (in order for the patient to meet his/her long term goals)  Physical therapy at least 2 days/week in the home     This discharge recommendation:  Has been made in collaboration with the attending provider and/or case management    IF patient discharges home will need the following DME: has rolling walker     OBJECTIVE DATA SUMMARY:     Critical Behavior:  Neurologic State: Alert, Appropriate for age  Orientation Level: Oriented X4  Cognition: Appropriate decision making, Follows commands  Safety/Judgement: Awareness of environment, Good awareness of safety precautions, Fall prevention(POD#0)    Functional Mobility Training:  Bed Mobility:  Rolling: Stand-by assistance  Supine to Sit: Stand-by assistance  Scooting: Stand-by assistance  Level of Assistance: Stand-by assistance  Interventions: Verbal cues    Transfers:  Sit to Stand: Stand-by assistance  Stand to Sit: Stand-by assistance  Bed to Chair: Stand-by assistance;Contact guard assistance  Interventions: Verbal cues; Tactile cues  Level of Assistance: Stand-by assistance;Contact guard assistance    Balance:  Sitting: Intact; Without support  Standing: Intact; With support  Standing - Static: Good;Constant support  Standing - Dynamic : Good;Constant support    Ambulation/Gait Training:  Distance (ft): 250 Feet (ft)  Assistive Device: Walker, rolling;Gait belt  Ambulation - Level of Assistance: Stand-by assistance;Contact guard assistance  Gait Abnormalities: Decreased step clearance; Antalgic  Right Side Weight Bearing: Full  Left Side Weight Bearing: Full  Base of Support: Widened  Speed/Renata: Pace decreased (<100 feet/min)  Step Length: Left shortened;Right shortened  Interventions: Verbal cues    Stairs:  Number of Stairs Trained: 4  Stairs - Level of Assistance: Supervision   Rail Use: Both    Therapeutic Exercises:   sitting  EXERCISE   Sets   Reps   Active Active Assist   Passive   Comments   Ankle pumps 1 10 [x] [] [] bilat   Heel raises 1 10 [x] [] [] \"   Toe tap 1 10 [x] [] [] \"   Knee ext 1 10 [x] [] [] \"   Hip flex 1 10 [x] [] [] \"     Pain Rating: see flow sheet    Activity Tolerance: Fair    After treatment patient left in no apparent distress: Sitting in chair and Call bell within reach    COMMUNICATION/COLLABORATION:   The patients plan of care was discussed with: Registered Nurse    Elan Saini PTA   Time Calculation: 30 mins

## 2019-12-11 NOTE — PROGRESS NOTES
Problem: Falls - Risk of  Goal: *Absence of Falls  Description  Document Iram Ospina Fall Risk and appropriate interventions in the flowsheet.   Outcome: Progressing Towards Goal  Note: Fall Risk Interventions:  Mobility Interventions: Patient to call before getting OOB, OT consult for ADLs, PT Consult for mobility concerns, PT Consult for assist device competence         Medication Interventions: Patient to call before getting OOB    Elimination Interventions: Call light in reach, Patient to call for help with toileting needs

## 2019-12-11 NOTE — PROGRESS NOTES
Orthopedic End of Shift Note    Bedside and Verbal shift change report given to VALDEMAR Nayak (oncoming nurse) by Rafy Salazar (offgoing nurse). Report included the following information SBAR, Kardex, OR Summary, Procedure Summary, Intake/Output, MAR, Recent Results and Med Rec Status. POD#  1    Significant issues during shift:     Issues for Physician to address:      Activity This Shift  (check all that apply) [] chair  [] dangle   [] bathroom  [x] bedside commode [] hallway  [] bedrest   Nausea/Vomiting [] yes [x] no     Voiding Status [x] void [] Martins [] I&O Cath   Bowel Movements [] yes [x] no     Foot Pumps or SCD [x] yes [] no    Ice Pack [x] yes    [] no    Incentive Spirometer [x] yes [] no Volume:      Telemetry Monitoring   [] yes [x] no Rhythm:   Supplemental O2 [] yes [x] no Sat off O2:   %

## 2019-12-11 NOTE — DISCHARGE SUMMARY
Ortho Discharge Summary    Patient ID:  Adiel Cardona  [id number]  female  78 y.o.  1940    Admit date: 12/10/2019    Discharge date: 12/11/2019    Admitting Physician: Harinder Edouard MD     Consulting Physician(s):   Treatment Team: Attending Provider: Kareen Solano MD; Care Manager: Tom Carr; Utilization Review: Maria Eugenia Neville Primary Nurse: Carito Sosa    Date of Surgery:   12/10/2019     Preoperative Diagnosis:  LEFT KNEE OA    Postoperative Diagnosis:   LEFT KNEE OA    Procedure(s):   LEFT TOTAL KNEE REPLACEMENT     Anesthesia Type:   General     Surgeon: Kareen Solano MD                            HPI:  Pt is a 78 y.o. female who has a history of LEFT KNEE OA  with pain and limitations of activities of daily living who presents at this time for a left TKA following the failure of conservative management. PMH:   Past Medical History:   Diagnosis Date    Arrhythmia     ? Hazeline English    Colon polyps     Dr Boyd Montes Diverticulitis     GERD (gastroesophageal reflux disease)     OCCASIONALLY    Hypertension     Ill-defined condition     stomach ulcer    Nausea & vomiting     Other ill-defined conditions(799.89)     HI CHOLESTEROL    Other ill-defined conditions(799.89)     DIVERTICULOSIS (HX: DIVERTICULITIS)    Psychiatric disorder     DEPRESSION       Body mass index is 24.3 kg/m². : A BMI > 30 is classified as obesity and > 40 is classified as morbid obesity. Medications upon admission :   Prior to Admission Medications   Prescriptions Last Dose Informant Patient Reported? Taking? Calcium-Cholecalciferol, D3, (CALCIUM 600 WITH VITAMIN D3) 600 mg(1,500mg) -400 unit chew 12/9/2019 at Unknown time  Yes Yes   Sig: Take 600 mg by mouth. FLUOROMETHOLONE (FML FORTE OP) 12/10/2019 at 0600  Yes Yes   Sig: Apply 1 Drop to eye daily (before breakfast).    Omeprazole delayed release (PRILOSEC D/R) 20 mg tablet 12/10/2019 at 0630  No Yes   Sig: Take 1 Tab by mouth daily. Indications: a stomach ulcer   acetaminophen (TYLENOL EXTRA STRENGTH) 500 mg tablet 12/8/2019  Yes No   Sig: Take 1,000 mg by mouth every six (6) hours as needed for Pain. aspirin 81 mg chewable tablet 11/10/2019 at Unknown time  Yes Yes   Sig: Take 81 mg by mouth daily. citalopram (CELEXA) 20 mg tablet 12/9/2019 at Unknown time  Yes Yes   Sig: Take 10 mg by mouth nightly. linaclotide (LINZESS PO) Unknown at Unknown time  Yes No   Sig: Take  by mouth.   lisinopril (PRINIVIL, ZESTRIL) 20 mg tablet 12/9/2019 at Unknown time  Yes Yes   Sig: Take  by mouth daily. lovastatin (MEVACOR) 20 mg tablet 12/9/2019 at Unknown time  Yes Yes   Sig: Take 20 mg by mouth nightly. omega-3 fatty acids-vitamin e (FISH OIL) 1,000 mg cap Not Taking at Unknown time  Yes No   Sig: Take 1 Cap by mouth. ondansetron (ZOFRAN ODT) 4 mg disintegrating tablet Unknown at Unknown time  No No   Sig: Take 1 Tab by mouth every eight (8) hours as needed for Nausea. timolol maleate 0.5 % DrpD ophthalmic solution 12/10/2019 at 0630  Yes Yes   Sig: Administer 1 Drop to left eye nightly. vit C/E/Zn/coppr/lutein/zeaxan (PRESERVISION AREDS-2 PO) 12/9/2019 at Unknown time  Yes Yes   Sig: Take  by mouth daily. Facility-Administered Medications: None        Allergies: Allergies   Allergen Reactions    Codeine Nausea and Vomiting    Nsaids (Non-Steroidal Anti-Inflammatory Drug) Other (comments)     Pt has stomach ulcer        Hospital Course: The patient underwent surgery. Complications:  None; patient tolerated the procedure well. Was taken to the PACU in stable condition and then transferred to the ortho floor.       Perioperative Antibiotics:  Ancef     Postoperative Pain Management:  Oxycodone & Tylenol     DVT Prophylaxis: Aspirin 81 BID    Postoperative transfusions:    Number of units banked PRBCs =   none     Post Op complications: none    Hemoglobin at discharge:    Lab Results   Component Value Date/Time    HGB 7.9 (L) 12/11/2019 04:00 AM    INR 1.0 11/19/2019 09:57 AM       Dressing remained clean, dry and intact. No significant erythema or swelling. Neurovascular exam found to be within normal limits. Wound appears to be healing without any evidence of infection. Pt had a HVAC drain that was removed on POD# 1. Physical Therapy started following surgery and participated in bed mobility, transfers and ambulation. Gait:  Gait  Base of Support: Widened  Speed/Renata: Pace decreased (<100 feet/min)  Step Length: Left shortened, Right shortened  Gait Abnormalities: Decreased step clearance, Antalgic  Ambulation - Level of Assistance: Stand-by assistance, Contact guard assistance  Distance (ft): 250 Feet (ft)  Assistive Device: Walker, rolling, Gait belt  Rail Use: Both  Stairs - Level of Assistance: Supervision  Number of Stairs Trained: 4  Interventions: Verbal cues            Interventions: Verbal cues      Discharged to: Home. Condition on Discharge:   stable    Discharge instructions:  - Anticoagulate with Aspirin 81 BID  - Take pain medications as prescribed  - Resume pre hospital diet      - Discharge activity: activity as tolerated  - Ambulate with assistive device as needed. - Weight bearing status - WBAT  - Wound Care Keep wound clean and dry. See discharge instruction sheet. -DISCHARGE MEDICATION LIST     Current Discharge Medication List      START taking these medications    Details   aspirin delayed-release 81 mg tablet Take 1 Tab by mouth two (2) times a day for 30 days. Qty: 60 Tab, Refills: 0      oxyCODONE IR (ROXICODONE) 5 mg immediate release tablet Take 1-2 Tabs by mouth every four (4) hours as needed for Pain for up to 14 days. Max Daily Amount: 60 mg.  One tab for mild to moderate pain level 1-6, or 2 tabs for severe pain level 7-10  Qty: 60 Tab, Refills: 0    Associated Diagnoses: Status post total left knee replacement      polyethylene glycol (MIRALAX) 17 gram packet Take 1 Packet by mouth daily as needed (constipation) for up to 15 days. Qty: 15 Packet, Refills: 0      senna-docusate (PERICOLACE) 8.6-50 mg per tablet Take 1 Tab by mouth daily. Qty: 30 Tab, Refills: 0         CONTINUE these medications which have CHANGED    Details   acetaminophen (TYLENOL) 325 mg tablet Take 2 Tabs by mouth every six (6) hours for 14 days. Qty: 112 Tab, Refills: 0         CONTINUE these medications which have NOT CHANGED    Details   vit C/E/Zn/coppr/lutein/zeaxan (PRESERVISION AREDS-2 PO) Take  by mouth daily. Omeprazole delayed release (PRILOSEC D/R) 20 mg tablet Take 1 Tab by mouth daily. Indications: a stomach ulcer  Qty: 30 Tab, Refills: 2      Calcium-Cholecalciferol, D3, (CALCIUM 600 WITH VITAMIN D3) 600 mg(1,500mg) -400 unit chew Take 600 mg by mouth. timolol maleate 0.5 % DrpD ophthalmic solution Administer 1 Drop to left eye nightly. FLUOROMETHOLONE (FML FORTE OP) Apply 1 Drop to eye daily (before breakfast). lisinopril (PRINIVIL, ZESTRIL) 20 mg tablet Take  by mouth daily. lovastatin (MEVACOR) 20 mg tablet Take 20 mg by mouth nightly. citalopram (CELEXA) 20 mg tablet Take 10 mg by mouth nightly. linaclotide (LINZESS PO) Take  by mouth. ondansetron (ZOFRAN ODT) 4 mg disintegrating tablet Take 1 Tab by mouth every eight (8) hours as needed for Nausea. Qty: 16 Tab, Refills: 0      omega-3 fatty acids-vitamin e (FISH OIL) 1,000 mg cap Take 1 Cap by mouth. STOP taking these medications       aspirin 81 mg chewable tablet Comments:   Reason for Stopping:            per medical continuation form      -Follow up in office in 2 weeks      Signed:  Ajit Veloz.  Sean Mares, JOSEPHINE-BC, ONP-C  Orthopaedic Nurse Practitioner    12/11/2019  11:18 AM

## 2019-12-11 NOTE — PHYSICIAN ADVISORY
Short Stay Review Pt Name:  Shania Stephens MR#  [id number] [facility]#   [id number] Room and Hospital  3207/01  @ [facility] Hospitalization date  12/10/2019  7:34 AM 
No discharge date for patient encounter. Current Attending Physician  Saeid Allred MD  
 
A discharge order has been placed for this episode of hospital care for Ms. Shania Stephens; since this hospital stay is less than two midnights, I reviewed Ms. Caro Dinero's chart. Ms. Aisha Dinero's healthcare insurance/benefit include: 
Payor: [place] MEDICARE / Plan: [place] MEDICARE PART A & B / Product Type: Medicare /  
 
Utilization Review related case summary:  
Age  78 y.o.  
BMI  Body mass index is 24.3 kg/m². Functional status ASA Class  2 PMHx includes  HTN, Arrhythmia, Hyperlipidemia, OA, PUD, GERD, depression EKG Echo Hospital course  Uncomplicated TKA PT OT evaluation Other Social/logistical issues Risk of deterioration at the time this patient was hospitalized     Moderate On the basis of chart review, this patient's hospitalization status     
is appropriate for INPATIENT The information in this document is a recommendation to be used for utilization review and utilization management purposes only. This recommendation is not an order. The recommendation is made based on the information reviewed at the time of the referral, is pursuant to the [facility] Conditions of Participation (42 CFR Part 482), and is neither a judgment nor an assessment with regard to the appropriateness or quality of clinical care. For all Managed Care patients:  The Criteria are intended solely for use as screening guidelines with respect to the medical appropriateness of healthcare services and not for final clinical or payment determinations concerning the type or level of medical care provided, or proposed to be provided, to a patient. They help the reviewers determine whether a patient is appropriate for observation or inpatient admission at the time a decision to admit the patient is being made. All efforts are made to apply the pertinent payor criteria ([employer] or [employer]) as well as the clinical judgements based on the information reviewed at the time of the referral.\" Nothing in this document may be used to limit, alter, or affect clinical services provided to the patient named below. Julianne Montalvo MD MPH FACP Cell: [phone] Physician Advisor Phoebe  [address]  
Utilization Review, Care Management CSN:  [id number] YOSEF:   [id number] Admitted on :  12/10/2019 Discharge order

## 2019-12-11 NOTE — PROGRESS NOTES
Ortho / Neurosurgery NP Note    POD# 1  s/p LEFT TOTAL KNEE REPLACEMENT   Pt seen with Dr. Damian Pandey    Pt resting in bed. No complaints. VSS Afebrile. Hypotension from last night improved. Will continue to monitor  Tolerating breakfast    Voiding status: + void  Output (mL)  Urine Voided: 500 ml (12/11/19 0136)  Last Bowel Movement Date: 12/10/19 (12/10/19 1932)      Labs  Lab Results   Component Value Date/Time    HGB 7.9 (L) 12/11/2019 04:00 AM      Lab Results   Component Value Date/Time    INR 1.0 11/19/2019 09:57 AM        Recent Glucose Results:   Lab Results   Component Value Date/Time    GLU 95 12/11/2019 04:00 AM         Body mass index is 24.3 kg/m². : A BMI > 30 is classified as obesity and > 40 is classified as morbid obesity. Optifoam Dressing c.d.i  Cryotherapy in place over incision  Drain removed this morning  Calves soft and supple;  No pain with passive stretch  Sensation and motor intact  SCDs for mechanical DVT proph while in bed     PLAN:  1) PT BID  2) Aspirin 81 mg PO BID for DVT Prophylaxis   3) GI Prophylaxis - Pepcid  4) Readniess for discharge:     [x] Vital Signs stable    [x] Hgb stable    [x] + Voiding    [x] Wound intact, drainage minimal    [x] Tolerating PO intake     [] Cleared by PT (OT if applicable)     [] Stair training completed (if applicable)    [] Independent / Contact Guard Assist (household distance)     [] Bed mobility     [] Car transfers     [] ADLs    [] Adequate pain control on oral medication alone     Plan home with family if clears PT today    [place] Leader, NP  DNP, ACNP-BC, ONP-C  t

## 2019-12-11 NOTE — OP NOTES
[address]  OPERATIVE REPORT    Name:  Gume Rosas  MR#:  [id number]  :  1940  ACCOUNT #:  [de-identified]  DATE OF SERVICE:  12/10/2019    PREOPERATIVE DIAGNOSIS:  Degenerative joint disease of left knee. POSTOPERATIVE DIAGNOSIS:  Degenerative joint disease of left knee. PROCEDURE PERFORMED:  Left total knee replacement. SURGEON:  Velasquez Lara MD    ASSISTANT:  None. ANESTHESIA:  General.    COMPLICATIONS:  None. SPECIMENS REMOVED:  None. IMPLANTS:  Yes. ESTIMATED BLOOD LOSS:  100 mL. CLOSURE:  Staples. PROCEDURE:  The patient was given smooth induction of general anesthesia in the supine position on operating table. The left lower extremity was prepped and draped with a thigh tourniquet. An anterior midline incision was made then a medial parapatellar retinacular incision. The patella was everted and resected to restore 23 mm of thickness. Three drill holes were made for the 34 patellar button. The knee was then flexed and a drill hole made in the distal femur for the IM guide talia, 12 mm of distal femur were resected at 5 degrees of valgus. Remaining cuts were made to accommodate a 65 Biomet Vanguard cruciate retaining femoral prosthesis in 7 degrees of external rotation. The proximal tibia was resected perpendicular and sized at 75. The tibial stem preparation was completed in the appropriate rotation. The bone surfaces were copiously irrigated with antibiotic solution and pulsatile lavage. The 3 components were cemented simultaneously with Beau cement. Trial tibial bearings revealed that a 12 standard would be ideal.  It was placed followed by the locking clip. The knee had excellent range of motion, stability and patellar tracking after a lateral release was performed. The wound was copiously irrigated. Tourniquet deflated and hemostasis obtained. The soft tissues were injected with Marcaine solution.   The fascia was then closed with #1 Ethibond and Vicryl over a medium Hemovac drain. Subcutaneous tissues were closed with 2-0 Vicryl and skin with staples. Sterile dressings were applied. The patient was taken to the recovery room in stable extubated condition with a correct count, good pulse to her foot.       Joe Parra MD      [id number]  D:  12/10/2019 11:34  T:  12/10/2019 19:10  JOB #:  [id number]

## 2019-12-11 NOTE — DISCHARGE INSTRUCTIONS
Discharge Instructions: How to [address]  Surgery: Total Knee Replacement  Surgeon:   Josey Fitch MD  Surgery Date:  12/10/2019     To relieve pain:   Use ice/gel packs.  Put the ice pack directly over the wound, or anywhere you are hurting or swollen.  To control pain and swelling, keep ice on regularly, especially after physical activity.  The packs should stay cold for 3-4 hours. When it is not cold anymore, rotate with the packs in the freezer.  Elevate your leg. This will also keep swelling down.  Rest for at least 20 minutes between activity or exercises.  To keep track of your pain medications, write down what you take and when you take it.  The last dose of pain medication you got in the hospital was:       Medication    Dose    Date & Time           Choose your medications based on the pain scale below:     To keep your pain under control, take Tylenol every 6 hours for 14 days - even if you feel like you dont need it.  For mild to moderate pain (4-6 on pain scale), take one pain pill every 3-4 hours as needed.  For severe pain (7-10 on pain scale), take two pain pills every 3-4 hours as needed.  To prevent nausea, take your pain medications with food. Pain Scale                 As your pain lessens:     Slowly start taking less pain medication. You may do this by waiting longer between doses or by taking smaller doses.  Stop using the pain medications as soon as you no longer need it, usually in 2-3 weeks.  Aspirin   To prevent blood clots, you will need to take Aspirin 81 mg twice a day for 30 days.                To prevent stomach upset or bleeding:   Do not take non-steroidal anti-inflammatory medications (Ibuprofen, Advil, Motrin, Naproxen, etc.)    Take Pepcid 20 mg twice a day, or a similar home medication, while you are taking a blood thinner. OPSITE (Honeycomb dressing)     Keep your clear, waterproof dressing in place for 5-7 days after your leave the hospital.     If you are still having drainage, you will need to change your dressing in 5-7 days. You will be given one extra dressing to use at home.  If there is no more drainage from the wound, you may leave it open to the air.  You will have some swelling, warmth, and bruising around the knee and up and down the leg after surgery. This will may get worse in the first few days you are home and will slowly get better over the next few weeks. OPSITE DRESSING INSTRUCTIONS                                              DO NOTs:   Do not rub your surgical wound   Do not put lotion or oils on your wound.  Do not take a tub bath or go swimming until your doctor says it is ok.  You may shower with this dressing over your wound.  After showering pat the dressing dry.  If you have staples a home health nurse will remove them in about 10 days.  To increase and promote healing:   Stop Smoking (or at least cut back on       Smoking).  Eat a well-balanced diet (high in protein       and vitamin C).  If you have a poor appetite, drink Ensure, Glucerna, or         [place] Instant Breakfast for the next 30 days.  If you are diabetic, you should control your blood         sugars to prevent infection and help your wound         to heal.                     f you have a poor appetite, drink Ensure, Glucerna, or [place] Instant Breakfast for the next 30 days.  If you are diabetic, you should control your blood                                                sugars to prevent infection and help your wound                                                to heal.     Prevent Infection    1. Wash your hands.  This is the most important thing you or your caregiver can do.    Wash your hands with soap and water (or use the hand  we gave you) before you touch any wounds. 2. Shower.  Use the antibacterial soap we gave you when you take a shower.  Shower with this soap until your wounds are healed. 3.  Use clean sheets.  Put freshly cleaned sheets on your bed after surgery.  To keep the surgery site clean, do not allow pets to sleep with you while your wound is still healing.  To prevent constipation, stay active and drink plenty of fluid.  While using pain medications, you should also take stool softeners and laxatives, such as Pericolace       and Miralax.  If you are having too many bowel       Movements, then you may need       to stop taking the laxatives.  You should have a bowel movement 3-4 days        after surgery and then at least every other day while       on pain medication.  To improve your recovery, you must be active!  Use your walker and take short walks (in your home)         about every 2 hours during the day.  Try to increase how far you walk each day.  You can put as much weight on your leg as you can tolerate while walking.  To avoid getting a stiff knee, work on getting your knee bent and straight as soon as possible.  Home health physical therapy will come to your       home the day after you leave the hospital.  The       therapist will visit about 4 times over the next        couple of weeks to teach you how to get out of        bed, to safely walk in your home, and to do your        exercises.  NO DRIVING until your surgeon tells you it is ok.  You can return to work when cleared by a physician.  Please call your physician immediately if you have:     Constant bleeding from your wound.  Increasing redness or swelling around your wound (Some warmth, bruising and swelling is normal).  Change in wound drainage (increase in amount, color, or bad smell).      Change in mental status (unusual behavior   Temperature over 101.5 degrees Fahrenheit      Pain or redness in the calf (back of your lower leg - see picture)     Increased swelling of the thigh, ankle, calf, or foot.  Emergency: CALL 911 if you have:     Shortness of breath     Chest pain when you cough or taking a deep breath     Please call your surgeons office at [phone]  for a follow up appointment. _   You should call as soon as you get home or the next day after discharge. Ask to make an appointment in 2 weeks.  If you have questions or concerns during normal business hours, you may reach Dr. Debby Le' Team at [phone].

## 2019-12-11 NOTE — PROGRESS NOTES
Orthopedic End of Shift Note    Bedside shift change report given to Giovanny Martínez RN (oncoming nurse) by Nayeli Anguiano RN (offgoing nurse). Report included the following information SBAR, Kardex, OR Summary, Procedure Summary, Intake/Output, MAR, Accordion, Recent Results and Med Rec Status.      POD#     Significant issues during shift: none    Issues for Physician to address: none    Activity This Shift  (check all that apply) [] chair  [] dangle   [] bathroom  [x] bedside commode [] hallway  [] bedrest   Nausea/Vomiting [] yes [x] no     Voiding Status [x] void [] Martins [] I&O Cath   Bowel Movements [] yes [x] no     Foot Pumps or SCD [x] yes [] no    Ice Pack [x] yes    [] no    Incentive Spirometer [x] yes [] no Volume:   2000   Telemetry Monitoring   [] yes [x] no Rhythm:   Supplemental O2 [] yes [x] no Sat off O2:   97%

## 2019-12-12 ENCOUNTER — APPOINTMENT (OUTPATIENT)
Dept: GENERAL RADIOLOGY | Age: 79
End: 2019-12-12
Attending: STUDENT IN AN ORGANIZED HEALTH CARE EDUCATION/TRAINING PROGRAM
Payer: MEDICARE

## 2019-12-12 ENCOUNTER — HOME CARE VISIT (OUTPATIENT)
Dept: SCHEDULING | Facility: HOME HEALTH | Age: 79
End: 2019-12-12
Payer: MEDICARE

## 2019-12-12 ENCOUNTER — HOSPITAL ENCOUNTER (EMERGENCY)
Age: 79
Discharge: HOME | End: 2019-12-12
Attending: EMERGENCY MEDICINE
Payer: MEDICARE

## 2019-12-12 VITALS
DIASTOLIC BLOOD PRESSURE: 53 MMHG | BODY MASS INDEX: 23.54 KG/M2 | OXYGEN SATURATION: 93 % | RESPIRATION RATE: 17 BRPM | TEMPERATURE: 99.3 F | WEIGHT: 150 LBS | SYSTOLIC BLOOD PRESSURE: 123 MMHG | HEIGHT: 67 IN | HEART RATE: 90 BPM

## 2019-12-12 VITALS
HEART RATE: 60 BPM | OXYGEN SATURATION: 95 % | SYSTOLIC BLOOD PRESSURE: 120 MMHG | DIASTOLIC BLOOD PRESSURE: 60 MMHG | RESPIRATION RATE: 18 BRPM | TEMPERATURE: 97.8 F

## 2019-12-12 DIAGNOSIS — G89.18 POST-OPERATIVE PAIN: Primary | ICD-10-CM

## 2019-12-12 DIAGNOSIS — Z98.890 S/P LEFT KNEE ARTHROSCOPY: ICD-10-CM

## 2019-12-12 DIAGNOSIS — M25.562 ACUTE PAIN OF LEFT KNEE: ICD-10-CM

## 2019-12-12 DIAGNOSIS — D72.829 LEUKOCYTOSIS, UNSPECIFIED TYPE: ICD-10-CM

## 2019-12-12 LAB
ALBUMIN SERPL-MCNC: 3.1 G/DL (ref 3.5–5)
ALBUMIN/GLOB SERPL: 0.8 {RATIO} (ref 1.1–2.2)
ALP SERPL-CCNC: 46 U/L (ref 45–117)
ALT SERPL-CCNC: 18 U/L (ref 12–78)
ANION GAP SERPL CALC-SCNC: 7 MMOL/L (ref 5–15)
AST SERPL-CCNC: 41 U/L (ref 15–37)
BASOPHILS # BLD: 0 K/UL (ref 0–0.1)
BASOPHILS NFR BLD: 0 % (ref 0–1)
BILIRUB SERPL-MCNC: 0.5 MG/DL (ref 0.2–1)
BUN SERPL-MCNC: 19 MG/DL (ref 6–20)
BUN/CREAT SERPL: 24 (ref 12–20)
CALCIUM SERPL-MCNC: 8.5 MG/DL (ref 8.5–10.1)
CHLORIDE SERPL-SCNC: 103 MMOL/L (ref 97–108)
CO2 SERPL-SCNC: 26 MMOL/L (ref 21–32)
CREAT SERPL-MCNC: 0.8 MG/DL (ref 0.55–1.02)
DIFFERENTIAL METHOD BLD: ABNORMAL
EOSINOPHIL # BLD: 0.2 K/UL (ref 0–0.4)
EOSINOPHIL NFR BLD: 1 % (ref 0–7)
ERYTHROCYTE [DISTWIDTH] IN BLOOD BY AUTOMATED COUNT: 15.6 % (ref 11.5–14.5)
GLOBULIN SER CALC-MCNC: 3.7 G/DL (ref 2–4)
GLUCOSE SERPL-MCNC: 103 MG/DL (ref 65–100)
HCT VFR BLD AUTO: 25.9 % (ref 35–47)
HGB BLD-MCNC: 8.4 G/DL (ref 11.5–16)
IMM GRANULOCYTES # BLD AUTO: 0.1 K/UL (ref 0–0.04)
IMM GRANULOCYTES NFR BLD AUTO: 0 % (ref 0–0.5)
LACTATE BLD-SCNC: 1.55 MMOL/L (ref 0.4–2)
LYMPHOCYTES # BLD: 2.4 K/UL (ref 0.8–3.5)
LYMPHOCYTES NFR BLD: 18 % (ref 12–49)
MCH RBC QN AUTO: 28.3 PG (ref 26–34)
MCHC RBC AUTO-ENTMCNC: 32.4 G/DL (ref 30–36.5)
MCV RBC AUTO: 87.2 FL (ref 80–99)
MONOCYTES # BLD: 1.6 K/UL (ref 0–1)
MONOCYTES NFR BLD: 12 % (ref 5–13)
NEUTS SEG # BLD: 9.2 K/UL (ref 1.8–8)
NEUTS SEG NFR BLD: 69 % (ref 32–75)
NRBC # BLD: 0 K/UL (ref 0–0.01)
NRBC BLD-RTO: 0 PER 100 WBC
PLATELET # BLD AUTO: 187 K/UL (ref 150–400)
PMV BLD AUTO: 11.8 FL (ref 8.9–12.9)
POTASSIUM SERPL-SCNC: 3.9 MMOL/L (ref 3.5–5.1)
PROT SERPL-MCNC: 6.8 G/DL (ref 6.4–8.2)
RBC # BLD AUTO: 2.97 M/UL (ref 3.8–5.2)
SODIUM SERPL-SCNC: 136 MMOL/L (ref 136–145)
WBC # BLD AUTO: 13.4 K/UL (ref 3.6–11)

## 2019-12-12 PROCEDURE — 83605 ASSAY OF LACTIC ACID: CPT

## 2019-12-12 PROCEDURE — 96365 THER/PROPH/DIAG IV INF INIT: CPT

## 2019-12-12 PROCEDURE — 3331090002 HH PPS REVENUE DEBIT

## 2019-12-12 PROCEDURE — 36415 COLL VENOUS BLD VENIPUNCTURE: CPT

## 2019-12-12 PROCEDURE — 3331090001 HH PPS REVENUE CREDIT

## 2019-12-12 PROCEDURE — 73562 X-RAY EXAM OF KNEE 3: CPT

## 2019-12-12 PROCEDURE — 400013 HH SOC

## 2019-12-12 PROCEDURE — 74011250636 HC RX REV CODE- 250/636: Performed by: STUDENT IN AN ORGANIZED HEALTH CARE EDUCATION/TRAINING PROGRAM

## 2019-12-12 PROCEDURE — 74011000258 HC RX REV CODE- 258: Performed by: STUDENT IN AN ORGANIZED HEALTH CARE EDUCATION/TRAINING PROGRAM

## 2019-12-12 PROCEDURE — 74011250637 HC RX REV CODE- 250/637: Performed by: EMERGENCY MEDICINE

## 2019-12-12 PROCEDURE — G0151 HHCP-SERV OF PT,EA 15 MIN: HCPCS

## 2019-12-12 PROCEDURE — 96375 TX/PRO/DX INJ NEW DRUG ADDON: CPT

## 2019-12-12 PROCEDURE — 85025 COMPLETE CBC W/AUTO DIFF WBC: CPT

## 2019-12-12 PROCEDURE — 74011250636 HC RX REV CODE- 250/636: Performed by: EMERGENCY MEDICINE

## 2019-12-12 PROCEDURE — 87040 BLOOD CULTURE FOR BACTERIA: CPT

## 2019-12-12 PROCEDURE — 99285 EMERGENCY DEPT VISIT HI MDM: CPT

## 2019-12-12 PROCEDURE — 80053 COMPREHEN METABOLIC PANEL: CPT

## 2019-12-12 RX ORDER — SODIUM CHLORIDE 0.9 % (FLUSH) 0.9 %
5-10 SYRINGE (ML) INJECTION AS NEEDED
Status: DISCONTINUED | OUTPATIENT
Start: 2019-12-12 | End: 2019-12-13 | Stop reason: HOSPADM

## 2019-12-12 RX ORDER — HYDROCODONE BITARTRATE AND ACETAMINOPHEN 5; 325 MG/1; MG/1
2 TABLET ORAL
Status: DISCONTINUED | OUTPATIENT
Start: 2019-12-12 | End: 2019-12-13 | Stop reason: HOSPADM

## 2019-12-12 RX ADMIN — PIPERACILLIN AND TAZOBACTAM 3.38 G: 3; .375 INJECTION, POWDER, LYOPHILIZED, FOR SOLUTION INTRAVENOUS at 21:17

## 2019-12-12 RX ADMIN — VANCOMYCIN HYDROCHLORIDE 1500 MG: 10 INJECTION, POWDER, LYOPHILIZED, FOR SOLUTION INTRAVENOUS at 21:51

## 2019-12-12 RX ADMIN — SODIUM CHLORIDE 1000 ML: 900 INJECTION, SOLUTION INTRAVENOUS at 22:09

## 2019-12-12 RX ADMIN — HYDROCODONE BITARTRATE AND ACETAMINOPHEN 2 TABLET: 5; 325 TABLET ORAL at 21:57

## 2019-12-12 NOTE — PROGRESS NOTES
Patient given discharge instructions. Patient given the opportunity to ask questions regarding the discharge instructions. Patient given prescriptions to be filled at the pharmacy of choice. Patient IV removed with cath tip intact. Patient given extra dressing and incision kit. Patient to be discharged home with home health.

## 2019-12-13 PROCEDURE — 3331090002 HH PPS REVENUE DEBIT

## 2019-12-13 PROCEDURE — 3331090001 HH PPS REVENUE CREDIT

## 2019-12-13 NOTE — ED NOTES
Denton Pfeiffer MD reviewed discharge instructions with the patient. The patient verbalized understanding. All questions and concerns were addressed. The patient is discharged by wheelchair with instructions and prescriptions in hand. Pt is alert and oriented x 4. Respirations are clear and unlabored.

## 2019-12-13 NOTE — CONSULTS
ORTHOPAEDIC CONSULT NOTE    Subjective:     Date of Consultation:  December 12, 2019      Beau Tan is a 78 y.o. female who is being seen for left knee pain and swelling, elevated temp(101.4). Pt is S/P L TKA 12/10 by Dr Amara Schultz. Pt states she is taking her Asprin and using pain meds as needed. Patient Active Problem List    Diagnosis Date Noted    Osteoarthrosis, localized, primary, knee, left 12/10/2019    Septic arthritis ([address] 08/30/2019    Rectocele 03/29/2011     Family History   Problem Relation Age of Onset    Cancer Other         colon cancer    Post-op Nausea/Vomiting Sister     Breast Cancer Sister         over 48    Post-op Nausea/Vomiting Brother       Social History     Tobacco Use    Smoking status: Never Smoker    Smokeless tobacco: Never Used   Substance Use Topics    Alcohol use: Yes     Comment: VERY RARE     Past Medical History:   Diagnosis Date    Arrhythmia     ? Fausto Hard    Colon polyps     Dr Hemant Whyte Diverticulitis     GERD (gastroesophageal reflux disease)     OCCASIONALLY    Hypertension     Ill-defined condition     stomach ulcer    Nausea & vomiting     Other ill-defined conditions(799.89)     HI CHOLESTEROL    Other ill-defined conditions(799.89)     DIVERTICULOSIS (HX: DIVERTICULITIS)    Psychiatric disorder     DEPRESSION      Past Surgical History:   Procedure Laterality Date    ABDOMEN SURGERY PROC UNLISTED      LYSIS OF ADHESIONS    COLONOSCOPY N/A 11/13/2019    COLONOSCOPY performed by Ajay Robert [facility] at [facility] ENDOSCOPY    HX APPENDECTOMY      HX CATARACT REMOVAL      BILAT WITH IOL with bilateral lens implants    HX HYSTERECTOMY      HX KNEE ARTHROSCOPY      RIGHT KNEE     HX MOHS PROCEDURES      RIGHT SHOULDER--ROTATOR CUFF    HX OOPHORECTOMY      HX ORTHOPAEDIC  6/2009    RIGHT TKR    HX ORTHOPAEDIC Right     shoulder - torn rotatory cuff and bone spur    HX TUBAL LIGATION      HX UROLOGICAL bladder sling    DE COLONOSCOPY FLX DX W/COLLJ SPEC WHEN PFRMD  6/25/2012           Prior to Admission medications    Medication Sig Start Date End Date Taking? Authorizing Provider   acetaminophen (TYLENOL) 325 mg tablet Take 2 Tabs by mouth every six (6) hours for 14 days. 12/11/19 12/25/19  Jerrio , NP   aspirin delayed-release 81 mg tablet Take 1 Tab by mouth two (2) times a day for 30 days. 12/11/19 1/10/20  Sade Carcamo, NP   oxyCODONE IR (ROXICODONE) 5 mg immediate release tablet Take 1-2 Tabs by mouth every four (4) hours as needed for Pain for up to 14 days. Max Daily Amount: 60 mg. One tab for mild to moderate pain level 1-6, or 2 tabs for severe pain level 7-10 12/11/19 12/25/19  Sade Carcamo NP   polyethylene glycol (MIRALAX) 17 gram packet Take 1 Packet by mouth daily as needed (constipation) for up to 15 days. 12/11/19 12/26/19  Sade Carcamo NP   senna-docusate (PERICOLACE) 8.6-50 mg per tablet Take 1 Tab by mouth daily. 12/11/19   Sade Carcamo, CATIE   vit C/E/Zn/coppr/lutein/zeaxan (PRESERVISION AREDS-2 PO) Take  by mouth daily. Provider, Historical   linaclotide (LINZESS PO) Take  by mouth. Provider, Historical   Omeprazole delayed release (PRILOSEC D/R) 20 mg tablet Take 1 Tab by mouth daily. Indications: a stomach ulcer 11/13/19   Ferdinand Chang MD   ondansetron (ZOFRAN ODT) 4 mg disintegrating tablet Take 1 Tab by mouth every eight (8) hours as needed for Nausea. 5/15/19   Bean Oates, DO   omega-3 fatty acids-vitamin e (FISH OIL) 1,000 mg cap Take 1 Cap by mouth. Elba, MD Declan   Calcium-Cholecalciferol, D3, (CALCIUM 600 WITH VITAMIN D3) 600 mg(1,500mg) -400 unit chew Take 600 mg by mouth. Elba, MD Declan   timolol maleate 0.5 % DrpD ophthalmic solution Administer 1 Drop to left eye nightly. 3/18/11   Provider, Historical   FLUOROMETHOLONE (FML FORTE OP) Apply 1 Drop to eye daily (before breakfast).  3/18/11   Provider, Historical   lisinopril (PRINIVIL, ZESTRIL) 20 mg tablet Take  by mouth daily. Provider, Historical   lovastatin (MEVACOR) 20 mg tablet Take 20 mg by mouth nightly. Provider, Historical   citalopram (CELEXA) 20 mg tablet Take 10 mg by mouth nightly. 3/18/11   Provider, Historical     Current Facility-Administered Medications   Medication Dose Route Frequency    sodium chloride (NS) flush 5-10 mL  5-10 mL IntraVENous PRN    HYDROcodone-acetaminophen (NORCO) 5-325 mg per tablet 2 Tab  2 Tab Oral Q4H PRN    sodium chloride 0.9 % bolus infusion 1,000 mL  1,000 mL IntraVENous ONCE     Current Outpatient Medications   Medication Sig    acetaminophen (TYLENOL) 325 mg tablet Take 2 Tabs by mouth every six (6) hours for 14 days.  aspirin delayed-release 81 mg tablet Take 1 Tab by mouth two (2) times a day for 30 days.  oxyCODONE IR (ROXICODONE) 5 mg immediate release tablet Take 1-2 Tabs by mouth every four (4) hours as needed for Pain for up to 14 days. Max Daily Amount: 60 mg. One tab for mild to moderate pain level 1-6, or 2 tabs for severe pain level 7-10    polyethylene glycol (MIRALAX) 17 gram packet Take 1 Packet by mouth daily as needed (constipation) for up to 15 days.  senna-docusate (PERICOLACE) 8.6-50 mg per tablet Take 1 Tab by mouth daily.  vit C/E/Zn/coppr/lutein/zeaxan (PRESERVISION AREDS-2 PO) Take  by mouth daily.  linaclotide (LINZESS PO) Take  by mouth.  Omeprazole delayed release (PRILOSEC D/R) 20 mg tablet Take 1 Tab by mouth daily. Indications: a stomach ulcer    ondansetron (ZOFRAN ODT) 4 mg disintegrating tablet Take 1 Tab by mouth every eight (8) hours as needed for Nausea.  omega-3 fatty acids-vitamin e (FISH OIL) 1,000 mg cap Take 1 Cap by mouth.  Calcium-Cholecalciferol, D3, (CALCIUM 600 WITH VITAMIN D3) 600 mg(1,500mg) -400 unit chew Take 600 mg by mouth.  timolol maleate 0.5 % DrpD ophthalmic solution Administer 1 Drop to left eye nightly.     FLUOROMETHOLONE (FML FORTE OP) Apply 1 Drop to eye daily (before breakfast).  lisinopril (PRINIVIL, ZESTRIL) 20 mg tablet Take  by mouth daily.  lovastatin (MEVACOR) 20 mg tablet Take 20 mg by mouth nightly.  citalopram (CELEXA) 20 mg tablet Take 10 mg by mouth nightly. Allergies   Allergen Reactions    Codeine Nausea and Vomiting    Nsaids (Non-Steroidal Anti-Inflammatory Drug) Other (comments)     Pt has stomach ulcer        Review of Systems:  A comprehensive review of systems was negative except for that written in the HPI. Mental Status: no dementia    Objective:     Patient Vitals for the past 8 hrs:   BP Temp Pulse Resp SpO2 Height Weight   19 2300 123/53    93 %     19 2230 114/42    93 %     19 2200 114/44    94 %     19 2130 118/48    92 %     19 146/59 99.3 °F (37.4 °C) 90 17 96 % 5' 6.5\" (1.689 m) 68 kg (150 lb)     Temp (24hrs), Av.6 °F (37 °C), Min:97.8 °F (36.6 °C), Max:99.3 °F (37.4 °C)      Gen: Well-developed,  in no acute distress   HEENT: Pink conjunctivae, hearing intact to voice, moist mucous membranes   Neck: Supple  Resp: No respiratory distress   Card: RRR, palpable distal pulse-equal bilaterally, birsk cap refill all distal digits   Abd:  non-distended  Musc: left knee with intact surgical incision- NO erythema, NO drainage. 1+ effusion. NO sign of LE VTE  Neuro: Cranial nerves are grossly intact, no focal motor weakness, follows commands appropriately   Psych: Good insight, oriented to person, place and time, alert    Imaging Review: XR KNEE LT 3 V  INDICATION: post-op fever and pain, total knee arthroplasty on 12-10-19. COMPARISON: 2019 radiographs.   FINDINGS: Three views of the left knee demonstrate interval long 3 component  left knee total arthroplasty. An anterior row of vertical skin staples is shown. A small knee effusion is noted. Nonspecific subcutaneous edema-like attenuation  is noted laterally and anteriorly at the knee.  There is normal bone  mineralization. There is no evidence for device loosening or fracture-dislocation. IMPRESSION: 3 component total knee arthroplasty. Small knee effusion. .    Labs:   Recent Results (from the past 24 hour(s))   POC LACTIC ACID    Collection Time: 12/12/19  8:54 PM   Result Value Ref Range    Lactic Acid (POC) 1.55 0.40 - 3.93 mmol/L   METABOLIC PANEL, COMPREHENSIVE    Collection Time: 12/12/19  9:01 PM   Result Value Ref Range    Sodium 136 136 - 145 mmol/L    Potassium 3.9 3.5 - 5.1 mmol/L    Chloride 103 97 - 108 mmol/L    CO2 26 21 - 32 mmol/L    Anion gap 7 5 - 15 mmol/L    Glucose 103 (H) 65 - 100 mg/dL    BUN 19 6 - 20 MG/DL    Creatinine 0.80 0.55 - 1.02 MG/DL    BUN/Creatinine ratio 24 (H) 12 - 20      GFR est AA >60 >60 ml/min/1.73m2    GFR est non-AA >60 >60 ml/min/1.73m2    Calcium 8.5 8.5 - 10.1 MG/DL    Bilirubin, total 0.5 0.2 - 1.0 MG/DL    ALT (SGPT) 18 12 - 78 U/L    AST (SGOT) 41 (H) 15 - 37 U/L    Alk. phosphatase 46 45 - 117 U/L    Protein, total 6.8 6.4 - 8.2 g/dL    Albumin 3.1 (L) 3.5 - 5.0 g/dL    Globulin 3.7 2.0 - 4.0 g/dL    A-G Ratio 0.8 (L) 1.1 - 2.2     CBC WITH AUTOMATED DIFF    Collection Time: 12/12/19  9:01 PM   Result Value Ref Range    WBC 13.4 (H) 3.6 - 11.0 K/uL    RBC 2.97 (L) 3.80 - 5.20 M/uL    HGB 8.4 (L) 11.5 - 16.0 g/dL    HCT 25.9 (L) 35.0 - 47.0 %    MCV 87.2 80.0 - 99.0 FL    MCH 28.3 26.0 - 34.0 PG    MCHC 32.4 30.0 - 36.5 g/dL    RDW 15.6 (H) 11.5 - 14.5 %    PLATELET 555 802 - 076 K/uL    MPV 11.8 8.9 - 12.9 FL    NRBC 0.0 0  WBC    ABSOLUTE NRBC 0.00 0.00 - 0.01 K/uL    NEUTROPHILS 69 32 - 75 %    LYMPHOCYTES 18 12 - 49 %    MONOCYTES 12 5 - 13 %    EOSINOPHILS 1 0 - 7 %    BASOPHILS 0 0 - 1 %    IMMATURE GRANULOCYTES 0 0.0 - 0.5 %    ABS. NEUTROPHILS 9.2 (H) 1.8 - 8.0 K/UL    ABS. LYMPHOCYTES 2.4 0.8 - 3.5 K/UL    ABS. MONOCYTES 1.6 (H) 0.0 - 1.0 K/UL    ABS. EOSINOPHILS 0.2 0.0 - 0.4 K/UL    ABS. BASOPHILS 0.0 0.0 - 0.1 K/UL    ABS. IMM. GRANS. 0.1 (H) 0.00 - 0.04 K/UL    DF AUTOMATED           Impression:     Patient Active Problem List    Diagnosis Date Noted    Osteoarthrosis, localized, primary, knee, left 12/10/2019    Septic arthritis ([facility] [address] 08/30/2019    Rectocele 03/29/2011     Active Problems:    * No active hospital problems. *      Plan:     Post op Left TKA without concerning features  No aspiration at this time  Continue mobility, cryotherapy as instructed at DC from hospital   Reviewed with pt and family Signs/symptoms/indications for FU, return to office/ER  Call office in the morning     Dr. Camilo Coronel aware and agrees with plan as above.         Elton Boggs PA-C  [employer]

## 2019-12-13 NOTE — ED PROVIDER NOTES
EMERGENCY DEPARTMENT HISTORY AND PHYSICAL EXAM      Please note that this dictation was completed with [employer], the computer voice recognition software. Quite often unanticipated grammatical, syntax, homophones, and other interpretive errors are inadvertently transcribed by the computer software. Please disregard these errors and any errors that have escaped final proofreading. Thank you. Date: 12/12/2019  Patient Name: Karen Cuevas  Patient Age and Sex: 78 y.o. female    History of Presenting Illness     Chief Complaint   Patient presents with    Fever    Post OP Complication       History Provided By: Patient    HPI: Karen Cuevas, 78 y.o. female with past medical history as documented below and now s/p total left knee arthroplasty on 12/10/2019. She presents to the ED with c/o of fevers at home to 102 by oral thermometer and worsening left knee pain. No chest pain, abdominal pain, nausea, vomiting, or numbness/tingling in the left leg or foot. Pt denies any other alleviating or exacerbating factors. Additionally, pt specifically denies any recent fever, chills, headache, nausea, vomiting, abdominal pain, CP, SOB, lightheadedness, dizziness, numbness, weakness, BLE swelling, heart palpitations, urinary sxs, diarrhea, constipation, melena, hematochezia, cough, or congestion. There are no other complaints, changes or physical findings at this time. PCP: Kaitlyn Katz MD    Past History   Past Medical History:  Past Medical History:   Diagnosis Date    Arrhythmia     ? Rosa Goldberg    Colon polyps     Dr Bridget Hawley Diverticulitis     GERD (gastroesophageal reflux disease)     OCCASIONALLY    Hypertension     Ill-defined condition     stomach ulcer    Nausea & vomiting     Other ill-defined conditions(799.89)     HI CHOLESTEROL    Other ill-defined conditions(799.89)     DIVERTICULOSIS (HX: DIVERTICULITIS)    Psychiatric disorder     DEPRESSION Past Surgical History:  Past Surgical History:   Procedure Laterality Date    ABDOMEN SURGERY PROC UNLISTED      LYSIS OF ADHESIONS    COLONOSCOPY N/A 11/13/2019    COLONOSCOPY performed by [facility]ade [facility] at [facility] ENDOSCOPY    HX APPENDECTOMY      HX CATARACT REMOVAL      BILAT WITH IOL with bilateral lens implants    HX HYSTERECTOMY      HX KNEE ARTHROSCOPY      RIGHT KNEE     HX MOHS PROCEDURES      RIGHT SHOULDER--ROTATOR CUFF    HX OOPHORECTOMY      HX ORTHOPAEDIC  6/2009    RIGHT TKR    HX ORTHOPAEDIC Right     shoulder - torn rotatory cuff and bone spur    HX TUBAL LIGATION      HX UROLOGICAL      bladder sling    LA COLONOSCOPY FLX DX W/COLLJ SPEC WHEN PFRMD  6/25/2012            Family History:  Family History   Problem Relation Age of Onset    Cancer Other         colon cancer    Post-op Nausea/Vomiting Sister     Breast Cancer Sister         over 48    Post-op Nausea/Vomiting Brother        Social History:  Social History     Tobacco Use    Smoking status: Never Smoker    Smokeless tobacco: Never Used   Substance Use Topics    Alcohol use: Yes     Comment: VERY RARE    Drug use: No       Allergies: Allergies   Allergen Reactions    Codeine Nausea and Vomiting    Nsaids (Non-Steroidal Anti-Inflammatory Drug) Other (comments)     Pt has stomach ulcer       Current Medications:  No current facility-administered medications on file prior to encounter. Current Outpatient Medications on File Prior to Encounter   Medication Sig Dispense Refill    acetaminophen (TYLENOL) 325 mg tablet Take 2 Tabs by mouth every six (6) hours for 14 days. 112 Tab 0    aspirin delayed-release 81 mg tablet Take 1 Tab by mouth two (2) times a day for 30 days. 60 Tab 0    oxyCODONE IR (ROXICODONE) 5 mg immediate release tablet Take 1-2 Tabs by mouth every four (4) hours as needed for Pain for up to 14 days. Max Daily Amount: 60 mg.  One tab for mild to moderate pain level 1-6, or 2 tabs for severe pain level 7-10 60 Tab 0    polyethylene glycol (MIRALAX) 17 gram packet Take 1 Packet by mouth daily as needed (constipation) for up to 15 days. 15 Packet 0    senna-docusate (PERICOLACE) 8.6-50 mg per tablet Take 1 Tab by mouth daily. 30 Tab 0    vit C/E/Zn/coppr/lutein/zeaxan (PRESERVISION AREDS-2 PO) Take  by mouth daily.  linaclotide (LINZESS PO) Take  by mouth.  Omeprazole delayed release (PRILOSEC D/R) 20 mg tablet Take 1 Tab by mouth daily. Indications: a stomach ulcer 30 Tab 2    ondansetron (ZOFRAN ODT) 4 mg disintegrating tablet Take 1 Tab by mouth every eight (8) hours as needed for Nausea. 16 Tab 0    omega-3 fatty acids-vitamin e (FISH OIL) 1,000 mg cap Take 1 Cap by mouth.  Calcium-Cholecalciferol, D3, (CALCIUM 600 WITH VITAMIN D3) 600 mg(1,500mg) -400 unit chew Take 600 mg by mouth.  timolol maleate 0.5 % DrpD ophthalmic solution Administer 1 Drop to left eye nightly.  FLUOROMETHOLONE (FML FORTE OP) Apply 1 Drop to eye daily (before breakfast).  lisinopril (PRINIVIL, ZESTRIL) 20 mg tablet Take  by mouth daily.  lovastatin (MEVACOR) 20 mg tablet Take 20 mg by mouth nightly.  citalopram (CELEXA) 20 mg tablet Take 10 mg by mouth nightly. Review of Systems   Review of Systems   Constitutional: Positive for chills and fever. HENT: Negative for sore throat and trouble swallowing. Eyes: Negative for visual disturbance. Respiratory: Negative for cough and shortness of breath. Cardiovascular: Negative for chest pain and palpitations. Gastrointestinal: Negative for abdominal pain, diarrhea, nausea and vomiting. Genitourinary: Negative for dysuria and hematuria. Musculoskeletal:        Left knee pain and swelling   Skin: Negative for rash. Neurological: Negative for dizziness, light-headedness and headaches. Physical Exam   Physical Exam  Vitals signs and nursing note reviewed. Exam conducted with a chaperone present. Constitutional:       General: She is not in acute distress. Appearance: Normal appearance. She is not ill-appearing. HENT:      Head: Normocephalic. Eyes:      Extraocular Movements: Extraocular movements intact. Pupils: Pupils are equal, round, and reactive to light. Neck:      Musculoskeletal: Normal range of motion. Cardiovascular:      Rate and Rhythm: Normal rate and regular rhythm. Pulses: Normal pulses. Pulmonary:      Effort: Pulmonary effort is normal.   Abdominal:      General: There is no distension. Palpations: Abdomen is soft. Tenderness: There is no tenderness. Musculoskeletal:      Comments: Left knee is edematous with patchy erythema but no streaking. The surgical incision on the anterior aspect of the knee is intact and appears to be in a good condition. Patient can actively range the knee minimally. The ankles and feet ROM are intact and appear edematous but are nontender. Skin:     General: Skin is warm and dry. Neurological:      General: No focal deficit present. Mental Status: She is alert and oriented to person, place, and time. Sensory: No sensory deficit. Psychiatric:         Mood and Affect: Mood normal.         Behavior: Behavior normal.         Diagnostic Study Results     Labs -  No results found for this or any previous visit (from the past 24 hour(s)). Radiologic Studies -   No orders to display     CT Results  (Last 48 hours)    None        CXR Results  (Last 48 hours)    None          Medical Decision Making   I am the first provider for this patient. I reviewed the vital signs, available nursing notes, past medical history, past surgical history, family history and social history. Vital Signs-Reviewed the patient's vital signs.   Patient Vitals for the past 24 hrs:   Temp Pulse Resp BP SpO2   12/12/19 2029 99.3 °F (37.4 °C) 90 17 146/59 96 %       Pulse Oximetry Analysis - 99% on RA    Cardiac Monitor:   Rate: 80 bpm  Rhythm: Normal Sinus Rhythm     Records Reviewed: Nursing Notes, Old Medical Records, Previous electrocardiograms, Previous Radiology Studies and Previous Laboratory Studies    Provider Notes (Medical Decision Making):     DDX: post-op complication, cellulitis, septic joint    Patient is afebrile here despite subjective fevers at home. Plain film ordered of the knee which was reviewed by the Ortho team and there was no concern for acute complications. With fevers at home to 102 zosyn was given empirically. Vancomycin was begun but was stopped after it was determined that it was unlikely the patient's joint was infected. No gross signs of septic joint. Ortho team saw the patient at bedside and recommended outpatient follow-up. Patient was discharged home. ED Course:   Initial assessment performed. The patients presenting problems have been discussed, and they are in agreement with the care plan formulated and outlined with them. I have encouraged them to ask questions as they arise throughout their visit. I reviewed our electronic medical record system for any past medical records that were available that may contribute to the patient's current condition, the nursing notes and vital signs from today's visit. Ev Mcintosh MD    ED Orders Placed :  Orders Placed This Encounter    CULTURE, BLOOD    CULTURE, BLOOD    METABOLIC PANEL, COMPREHENSIVE    CBC WITH AUTOMATED DIFF    POC  LACTIC ACID    VITAL SIGNS - PER UNIT ROUTINE    INSERT PERIPHERAL IV ONE TIME STAT    sodium chloride (NS) flush 5-10 mL     ED Medications Administered:  Medications   sodium chloride (NS) flush 5-10 mL (has no administration in time range)        Progress Note:  Patient has been reassessed and reports feeling better and symptoms have improved significantly after ED treatment. Patient feels comfortable going home with close follow-up.  Caro Dinero's final labs and imaging have been reviewed with her and available family and/or caregiver. They have been counseled regarding her diagnosis. She verbally conveys understanding and agreement of the signs, symptoms, diagnosis, treatment and prognosis and additionally agrees to follow up as recommended with Dr. Vanetta Gosselin, MD and/or specialist in 24 - 48 hours. She also agrees with the care-plan we created together and conveys that all of her questions have been answered. I have also put together some discharge instructions for her that include: 1) educational information regarding their diagnosis, 2) how to care for their diagnosis at home, as well a 3) list of reasons why they would want to return to the ED prior to their follow-up appointment should the patient's condition change or symptoms worsen. I have answered all questions to the patient's satisfaction. Strict return precautions given. She both understood and agreed with plan as discussed. Vital signs stable for discharge. Disposition: DISCHARGE  The pt is ready for discharge. The pt's signs, symptoms, diagnosis, and discharge instructions have been discussed and pt has conveyed their understanding. The pt is to follow up as recommended or return to ER should their symptoms worsen. Plan has been discussed and pt is in agreement. PLAN:  1. Return precautions as discussed. 2.   Discharge Medication List as of 12/12/2019 11:12 PM      No current facility-administered medications for this encounter. Current Outpatient Medications:     acetaminophen (TYLENOL) 325 mg tablet, Take 2 Tabs by mouth every six (6) hours for 14 days. , Disp: 112 Tab, Rfl: 0    aspirin delayed-release 81 mg tablet, Take 1 Tab by mouth two (2) times a day for 30 days. , Disp: 60 Tab, Rfl: 0    oxyCODONE IR (ROXICODONE) 5 mg immediate release tablet, Take 1-2 Tabs by mouth every four (4) hours as needed for Pain for up to 14 days. Max Daily Amount: 60 mg.  One tab for mild to moderate pain level 1-6, or 2 tabs for severe pain level 7-10, Disp: 60 Tab, Rfl: 0    polyethylene glycol (MIRALAX) 17 gram packet, Take 1 Packet by mouth daily as needed (constipation) for up to 15 days. , Disp: 15 Packet, Rfl: 0    senna-docusate (PERICOLACE) 8.6-50 mg per tablet, Take 1 Tab by mouth daily. , Disp: 30 Tab, Rfl: 0    vit C/E/Zn/coppr/lutein/zeaxan (PRESERVISION AREDS-2 PO), Take  by mouth daily. , Disp: , Rfl:     linaclotide (LINZESS PO), Take  by mouth., Disp: , Rfl:     Omeprazole delayed release (PRILOSEC D/R) 20 mg tablet, Take 1 Tab by mouth daily. Indications: a stomach ulcer, Disp: 30 Tab, Rfl: 2    ondansetron (ZOFRAN ODT) 4 mg disintegrating tablet, Take 1 Tab by mouth every eight (8) hours as needed for Nausea., Disp: 16 Tab, Rfl: 0    omega-3 fatty acids-vitamin e (FISH OIL) 1,000 mg cap, Take 1 Cap by mouth., Disp: , Rfl:     Calcium-Cholecalciferol, D3, (CALCIUM 600 WITH VITAMIN D3) 600 mg(1,500mg) -400 unit chew, Take 600 mg by mouth., Disp: , Rfl:     timolol maleate 0.5 % DrpD ophthalmic solution, Administer 1 Drop to left eye nightly., Disp: , Rfl:     FLUOROMETHOLONE (FML FORTE OP), Apply 1 Drop to eye daily (before breakfast). , Disp: , Rfl:     lisinopril (PRINIVIL, ZESTRIL) 20 mg tablet, Take  by mouth daily. , Disp: , Rfl:     lovastatin (MEVACOR) 20 mg tablet, Take 20 mg by mouth nightly., Disp: , Rfl:     citalopram (CELEXA) 20 mg tablet, Take 10 mg by mouth nightly., Disp: , Rfl:     3. Follow-up Information     Follow up With Specialties Details Why Contact Info    [facility] EMERGENCY DEPT Emergency Medicine   [address] [facility] [address]    Amberly Vera MD Family Practice  If symptoms worsen, As needed Zillah [phone]            Return to ED if worse  Diagnosis     Clinical Impression:   1. Post-operative pain    2. S/P left knee arthroscopy    3. Acute pain of left knee    4.  Leukocytosis, unspecified type        Attestation:  Balbina Zamudio, MD        ===================================================================  Attending Sharron Howard, 78 y.o. female with PMHx significant for left TKA 12/10 by Dr. Debby Le, reports mild to moderate left knee pain and swelling and low grade fevers for 2 days. General: NAD  HEENT: EOMI, non-icteric sclera  Chest: RRR, no m/r/g  Lungs: CTAB  Abd: soft, nondistended, nontender, no CVAT, +bs. No guarding or rebound. Ext: no peripheral edema, no cyanosis, +2 peripheral pulses  Skin: no rashes or lesions  Neuro: no focal deficits    Impression/Plan:  78 y.o. female presenting with post-op pain/swelling, no evidence of septic joint, seen by Ortho, no aspiration needed, will f/u with Ortho in clinic. HYPERTENSION COUNSELING  Education was provided to the patient today regarding their hypertension. Patient is made aware of their elevated blood pressure and is instructed to follow up this week with their Primary Care for a recheck. Patient is counseled regarding consequences of chronic, uncontrolled hypertension including kidney disease, heart disease, stroke or even death. Patient states their understanding and agrees to follow up this week. Additionally, during their visit, I discussed sodium restriction, maintaining ideal body weight and regular exercise program as physiologic means to achieve blood pressure control. The patient will strive towards this. I have personally seen and examined the patient; I have reviewed the resident's note and agree with findings and plan. I was present during the key portions of separately billed procedures and involved in all portions of critical care management. Chuck Espinal MD    This note will not be viewable in [address]

## 2019-12-13 NOTE — DISCHARGE INSTRUCTIONS
Thank you for allowing us to take care of you today! We hope we addressed all of your concerns and needs. We strive to provide excellent quality care in the Emergency Department. You will receive a survey after your visit to evaluate the care you were provided. Should you receive a survey from us, we invite you to share your experience and tell us what made it excellent. It was a pleasure serving you, we invite you to share your experience with us, in our pursuit for excellence, should you be selected to receive a survey. The exam and treatment you received in the Emergency Department were for an urgent problem and are not intended as complete care. It is important that you follow up with a doctor, nurse practitioner, or physician assistant for ongoing care. If your symptoms become worse or you do not improve as expected and you are unable to reach your usual health care provider, you should return to the Emergency Department. We are available 24 hours a day. Please take your discharge instructions with you when you go to your follow-up appointment. If you have any problem arranging a follow-up appointment, contact the Emergency Department immediately. If a prescription has been provided, please have it filled as soon as possible to prevent a delay in treatment. Read the entire medication instruction sheet provided to you by the pharmacy. If you have any questions or reservations about taking the medication due to side effects or interactions with other medications, please call your primary care physician or contact the ER to speak with the charge nurse. Make an appointment with your family doctor or the physician you were referred to for follow-up of this visit as instructed on your discharge paperwork, as this is mandatory follow-up. Return to the ER if you are unable to be seen or if you are unable to be seen in a timely manner.     If you have any problem arranging the follow-up visit, contact the Emergency Department immediately. I hope you feel better and thank you again for allow us to provide you with excellent care today at [facility]! Warmest regards,    Balbina Zamudio MD  Emergency Medicine Physician  [facility]        _____________________________________________________________________________________________________________    Vitals:    12/12/19 2029 12/12/19 2130 12/12/19 2200 12/12/19 2230   BP: 146/59 118/48 114/44 114/42   BP 1 Location: Left arm      BP Patient Position: Sitting      Pulse: 90      Resp: 17      Temp: 99.3 °F (37.4 °C)      SpO2: 96% 92% 94% 93%   Weight: 68 kg (150 lb)      Height: 5' 6.5\" (1.689 m)          Recent Results (from the past 12 hour(s))   POC LACTIC ACID    Collection Time: 12/12/19  8:54 PM   Result Value Ref Range    Lactic Acid (POC) 1.55 0.40 - 4.94 mmol/L   METABOLIC PANEL, COMPREHENSIVE    Collection Time: 12/12/19  9:01 PM   Result Value Ref Range    Sodium 136 136 - 145 mmol/L    Potassium 3.9 3.5 - 5.1 mmol/L    Chloride 103 97 - 108 mmol/L    CO2 26 21 - 32 mmol/L    Anion gap 7 5 - 15 mmol/L    Glucose 103 (H) 65 - 100 mg/dL    BUN 19 6 - 20 MG/DL    Creatinine 0.80 0.55 - 1.02 MG/DL    BUN/Creatinine ratio 24 (H) 12 - 20      GFR est AA >60 >60 ml/min/1.73m2    GFR est non-AA >60 >60 ml/min/1.73m2    Calcium 8.5 8.5 - 10.1 MG/DL    Bilirubin, total 0.5 0.2 - 1.0 MG/DL    ALT (SGPT) 18 12 - 78 U/L    AST (SGOT) 41 (H) 15 - 37 U/L    Alk.  phosphatase 46 45 - 117 U/L    Protein, total 6.8 6.4 - 8.2 g/dL    Albumin 3.1 (L) 3.5 - 5.0 g/dL    Globulin 3.7 2.0 - 4.0 g/dL    A-G Ratio 0.8 (L) 1.1 - 2.2     CBC WITH AUTOMATED DIFF    Collection Time: 12/12/19  9:01 PM   Result Value Ref Range    WBC 13.4 (H) 3.6 - 11.0 K/uL    RBC 2.97 (L) 3.80 - 5.20 M/uL    HGB 8.4 (L) 11.5 - 16.0 g/dL    HCT 25.9 (L) 35.0 - 47.0 %    MCV 87.2 80.0 - 99.0 FL    MCH 28.3 26.0 - 34.0 PG    MCHC 32.4 30.0 - 36.5 g/dL    RDW 15.6 (H) 11.5 - 14.5 %    PLATELET 963 864 - 848 K/uL    MPV 11.8 8.9 - 12.9 FL    NRBC 0.0 0  WBC    ABSOLUTE NRBC 0.00 0.00 - 0.01 K/uL    NEUTROPHILS 69 32 - 75 %    LYMPHOCYTES 18 12 - 49 %    MONOCYTES 12 5 - 13 %    EOSINOPHILS 1 0 - 7 %    BASOPHILS 0 0 - 1 %    IMMATURE GRANULOCYTES 0 0.0 - 0.5 %    ABS. NEUTROPHILS 9.2 (H) 1.8 - 8.0 K/UL    ABS. LYMPHOCYTES 2.4 0.8 - 3.5 K/UL    ABS. MONOCYTES 1.6 (H) 0.0 - 1.0 K/UL    ABS. EOSINOPHILS 0.2 0.0 - 0.4 K/UL    ABS. BASOPHILS 0.0 0.0 - 0.1 K/UL    ABS. IMM. GRANS. 0.1 (H) 0.00 - 0.04 K/UL    DF AUTOMATED         XR KNEE LT 3 V   Final Result   IMPRESSION: 3 component total knee arthroplasty. Small knee effusion. .        CT Results  (Last 48 hours)    None          Local Primary Care Physicians   [facility] [phone]  MD Suzi Farrell MD Viki Andrew, MD [facility] [phone]  Triston Christian, [facility]  MD Lori Liu MD Lundy East, MD [address] [facility] [address] [phone]  MD Teja Brice [place] [address] [facility] [phone]  MD Gurwinder Machado MD Loma Searles, MD Pauleen Diesel, MD   [facility] [phone]  [facility] MD Octavio DAVENPORT MD Foster Duncan, NP [address] [facility] [address] [phone]  MD Rory Richards MD Jacqueline Pimple, MD Iola Buhl, MD Margarie Keeling, MD Marcela Thomas MD   [facility]  Oly Neil MD [address] [facility] [phone]  Madeleine Bowers, MD Sandra Foss, CATIE Quinn, MD Vandana Taylor, MD Carri Johnson, MD Lorin Libman, MD Vergara Pi, MD   [address] [facility] [phone]  Mabel Parada, MD Holly Zhang, MARCP  Meagan Jacques, CATIE Anne, MD Ashley Contreras, MD Hurman Mcardle, MD Vamshi Cherry MD [facility] [address] [facility]  MD Hayde Parker MD Eliodoro Hoe, MD Jaime Rickers, MD Elfredia December, MD   [address] [phone]  MD Carlos Lowery MD [facility] [phone]  Garrison Palin, MD Jannette Essex, MD Geraldo Bair, MD   [address] [facility] [phone]  Alease Gilford, MD Luzmaria Johnston, MD Jeanine Pritchard MD Davida Hals, MD Gerri Conte, NP  Conner Duffy MD [address] [facility]   [phone]  MD Eliza Ray MD Gerardo Dy, MD     [address] [facility] [phone]  MD Theresa Santos, FNP  Norris Wheat, PAVipinC  Norris Wheat, MARCP  Estelle Simmonds, PA-C  MD Fawad Santana, CATIE Pennington DO   Miscellaneous:  Stephania Short MD [facility] Departments   For adult and child immunizations, family planning, TB screening, STD testing and women's health services. [facility] [place] [phone]     [facility] 25   [address] [facility]   [address]   [address] [facility]: [address] [phone]     [address] [place] [address]        [address] [address]  For primary care services, woman and child wellness, and some clinics providing specialty care. [facility] [address] [facility] [place] [address] [phone]   [address] [place] [address] [facility] [address] [facility] [address] [facility] [phone]   [address] [facility] [address] [place] [address] [phone]   [address]  [employer] [address] [place] [address] [address] [place] [address] Dr [phone]   [address] [place] [address] Road  [address] [place] [phone]   [address] [phone]   [facility] [address], 809 [facility]: [address] [facility] [address]     [place] [address] [phone]   [employer]  [address] ([email or web address])         Sexual Health/Woman Wellness Clinics   For STD/HIV testing and treatment, pregnancy testing and services, men's health, birth control services, LGBT services, and hepatitis/HPV vaccine services. [employer] [facility] [place] [employer] [address] [facility] 75 [facility] 15[address] [phone]   [facility] [address], 5th floor [phone]   Pregnancy [address] [facility] [address]  [address] [facility] [phone]        [address] [facility]   [phone]   [place]   [phone]   Women, Infant and Children's Services: [address] [phone]       [address] [facility] [address]   [phone]   [address] [facility]. 16.   1212 \[facility]\""

## 2019-12-14 PROCEDURE — 3331090002 HH PPS REVENUE DEBIT

## 2019-12-14 PROCEDURE — 3331090001 HH PPS REVENUE CREDIT

## 2019-12-15 PROCEDURE — 3331090002 HH PPS REVENUE DEBIT

## 2019-12-15 PROCEDURE — 3331090001 HH PPS REVENUE CREDIT

## 2019-12-16 ENCOUNTER — HOME CARE VISIT (OUTPATIENT)
Dept: SCHEDULING | Facility: HOME HEALTH | Age: 79
End: 2019-12-16
Payer: MEDICARE

## 2019-12-16 VITALS
SYSTOLIC BLOOD PRESSURE: 160 MMHG | OXYGEN SATURATION: 99 % | HEART RATE: 84 BPM | RESPIRATION RATE: 18 BRPM | TEMPERATURE: 98.2 F | DIASTOLIC BLOOD PRESSURE: 80 MMHG

## 2019-12-16 PROCEDURE — G0151 HHCP-SERV OF PT,EA 15 MIN: HCPCS

## 2019-12-16 PROCEDURE — 3331090002 HH PPS REVENUE DEBIT

## 2019-12-16 PROCEDURE — 3331090001 HH PPS REVENUE CREDIT

## 2019-12-17 PROCEDURE — 3331090001 HH PPS REVENUE CREDIT

## 2019-12-17 PROCEDURE — 3331090002 HH PPS REVENUE DEBIT

## 2019-12-18 ENCOUNTER — HOME CARE VISIT (OUTPATIENT)
Dept: SCHEDULING | Facility: HOME HEALTH | Age: 79
End: 2019-12-18
Payer: MEDICARE

## 2019-12-18 VITALS
OXYGEN SATURATION: 95 % | DIASTOLIC BLOOD PRESSURE: 80 MMHG | RESPIRATION RATE: 18 BRPM | HEART RATE: 90 BPM | SYSTOLIC BLOOD PRESSURE: 136 MMHG | TEMPERATURE: 98.7 F

## 2019-12-18 LAB
BACTERIA SPEC CULT: NORMAL
BACTERIA SPEC CULT: NORMAL
SERVICE CMNT-IMP: NORMAL
SERVICE CMNT-IMP: NORMAL

## 2019-12-18 PROCEDURE — G0151 HHCP-SERV OF PT,EA 15 MIN: HCPCS

## 2019-12-18 PROCEDURE — 3331090001 HH PPS REVENUE CREDIT

## 2019-12-18 PROCEDURE — 3331090002 HH PPS REVENUE DEBIT

## 2019-12-19 PROCEDURE — 3331090001 HH PPS REVENUE CREDIT

## 2019-12-19 PROCEDURE — 3331090002 HH PPS REVENUE DEBIT

## 2019-12-20 PROCEDURE — 3331090001 HH PPS REVENUE CREDIT

## 2019-12-20 PROCEDURE — 3331090002 HH PPS REVENUE DEBIT

## 2019-12-21 PROCEDURE — 3331090001 HH PPS REVENUE CREDIT

## 2019-12-21 PROCEDURE — 3331090002 HH PPS REVENUE DEBIT

## 2019-12-22 PROCEDURE — 3331090002 HH PPS REVENUE DEBIT

## 2019-12-22 PROCEDURE — 3331090001 HH PPS REVENUE CREDIT

## 2019-12-23 PROCEDURE — 3331090001 HH PPS REVENUE CREDIT

## 2019-12-23 PROCEDURE — 3331090002 HH PPS REVENUE DEBIT

## 2019-12-24 ENCOUNTER — HOME CARE VISIT (OUTPATIENT)
Dept: SCHEDULING | Facility: HOME HEALTH | Age: 79
End: 2019-12-24
Payer: MEDICARE

## 2019-12-24 VITALS
RESPIRATION RATE: 18 BRPM | DIASTOLIC BLOOD PRESSURE: 70 MMHG | HEART RATE: 88 BPM | SYSTOLIC BLOOD PRESSURE: 134 MMHG | TEMPERATURE: 98.4 F | OXYGEN SATURATION: 99 %

## 2019-12-24 PROCEDURE — 3331090001 HH PPS REVENUE CREDIT

## 2019-12-24 PROCEDURE — G0151 HHCP-SERV OF PT,EA 15 MIN: HCPCS

## 2019-12-24 PROCEDURE — 3331090003 HH PPS REVENUE ADJ

## 2019-12-24 PROCEDURE — 3331090002 HH PPS REVENUE DEBIT

## 2019-12-25 PROCEDURE — 3331090001 HH PPS REVENUE CREDIT

## 2019-12-25 PROCEDURE — 3331090002 HH PPS REVENUE DEBIT

## 2019-12-30 ENCOUNTER — HOSPITAL ENCOUNTER (EMERGENCY)
Age: 79
Discharge: HOME | End: 2019-12-30
Attending: EMERGENCY MEDICINE
Payer: MEDICARE

## 2019-12-30 VITALS
WEIGHT: 147.05 LBS | SYSTOLIC BLOOD PRESSURE: 132 MMHG | HEIGHT: 66 IN | HEART RATE: 69 BPM | DIASTOLIC BLOOD PRESSURE: 55 MMHG | BODY MASS INDEX: 23.63 KG/M2 | OXYGEN SATURATION: 100 % | TEMPERATURE: 98.8 F | RESPIRATION RATE: 18 BRPM

## 2019-12-30 DIAGNOSIS — R30.0 DYSURIA: ICD-10-CM

## 2019-12-30 DIAGNOSIS — R53.83 FATIGUE, UNSPECIFIED TYPE: Primary | ICD-10-CM

## 2019-12-30 LAB
ALBUMIN SERPL-MCNC: 3.7 G/DL (ref 3.5–5)
ALBUMIN/GLOB SERPL: 0.8 {RATIO} (ref 1.1–2.2)
ALP SERPL-CCNC: 71 U/L (ref 45–117)
ALT SERPL-CCNC: 22 U/L (ref 12–78)
ANION GAP SERPL CALC-SCNC: 7 MMOL/L (ref 5–15)
APPEARANCE UR: CLEAR
AST SERPL-CCNC: 16 U/L (ref 15–37)
ATRIAL RATE: 65 BPM
BACTERIA URNS QL MICRO: NEGATIVE /HPF
BASOPHILS # BLD: 0 K/UL (ref 0–0.1)
BASOPHILS NFR BLD: 0 % (ref 0–1)
BILIRUB SERPL-MCNC: 0.9 MG/DL (ref 0.2–1)
BILIRUB UR QL: NEGATIVE
BUN SERPL-MCNC: 28 MG/DL (ref 6–20)
BUN/CREAT SERPL: 28 (ref 12–20)
CALCIUM SERPL-MCNC: 9.2 MG/DL (ref 8.5–10.1)
CALCULATED P AXIS, ECG09: 38 DEGREES
CALCULATED R AXIS, ECG10: 69 DEGREES
CALCULATED T AXIS, ECG11: 71 DEGREES
CHLORIDE SERPL-SCNC: 103 MMOL/L (ref 97–108)
CO2 SERPL-SCNC: 27 MMOL/L (ref 21–32)
COLOR UR: ABNORMAL
CREAT SERPL-MCNC: 0.99 MG/DL (ref 0.55–1.02)
D DIMER PPP FEU-MCNC: 8.65 MG/L FEU (ref 0–0.65)
DIAGNOSIS, 93000: NORMAL
DIFFERENTIAL METHOD BLD: ABNORMAL
EOSINOPHIL # BLD: 0.3 K/UL (ref 0–0.4)
EOSINOPHIL NFR BLD: 3 % (ref 0–7)
EPITH CASTS URNS QL MICRO: ABNORMAL /LPF
ERYTHROCYTE [DISTWIDTH] IN BLOOD BY AUTOMATED COUNT: 16.5 % (ref 11.5–14.5)
FLUAV AG NPH QL IA: NEGATIVE
FLUBV AG NOSE QL IA: NEGATIVE
GLOBULIN SER CALC-MCNC: 4.5 G/DL (ref 2–4)
GLUCOSE SERPL-MCNC: 94 MG/DL (ref 65–100)
GLUCOSE UR STRIP.AUTO-MCNC: NEGATIVE MG/DL
HCT VFR BLD AUTO: 31.6 % (ref 35–47)
HGB BLD-MCNC: 9.8 G/DL (ref 11.5–16)
HGB UR QL STRIP: NEGATIVE
IMM GRANULOCYTES # BLD AUTO: 0.1 K/UL (ref 0–0.04)
IMM GRANULOCYTES NFR BLD AUTO: 1 % (ref 0–0.5)
KETONES UR QL STRIP.AUTO: NEGATIVE MG/DL
LEUKOCYTE ESTERASE UR QL STRIP.AUTO: ABNORMAL
LYMPHOCYTES # BLD: 2.1 K/UL (ref 0.8–3.5)
LYMPHOCYTES NFR BLD: 19 % (ref 12–49)
MCH RBC QN AUTO: 27.5 PG (ref 26–34)
MCHC RBC AUTO-ENTMCNC: 31 G/DL (ref 30–36.5)
MCV RBC AUTO: 88.8 FL (ref 80–99)
MONOCYTES # BLD: 1 K/UL (ref 0–1)
MONOCYTES NFR BLD: 9 % (ref 5–13)
NEUTS SEG # BLD: 7.6 K/UL (ref 1.8–8)
NEUTS SEG NFR BLD: 68 % (ref 32–75)
NITRITE UR QL STRIP.AUTO: NEGATIVE
NRBC # BLD: 0 K/UL (ref 0–0.01)
NRBC BLD-RTO: 0 PER 100 WBC
P-R INTERVAL, ECG05: 146 MS
PH UR STRIP: 5.5 [PH] (ref 5–8)
PLATELET # BLD AUTO: 584 K/UL (ref 150–400)
PMV BLD AUTO: 10.5 FL (ref 8.9–12.9)
POTASSIUM SERPL-SCNC: 4.4 MMOL/L (ref 3.5–5.1)
PROT SERPL-MCNC: 8.2 G/DL (ref 6.4–8.2)
PROT UR STRIP-MCNC: NEGATIVE MG/DL
Q-T INTERVAL, ECG07: 398 MS
QRS DURATION, ECG06: 82 MS
QTC CALCULATION (BEZET), ECG08: 413 MS
RBC # BLD AUTO: 3.56 M/UL (ref 3.8–5.2)
RBC #/AREA URNS HPF: ABNORMAL /HPF (ref 0–5)
SODIUM SERPL-SCNC: 137 MMOL/L (ref 136–145)
SP GR UR REFRACTOMETRY: 1.03 (ref 1–1.03)
UA: UC IF INDICATED,UAUC: ABNORMAL
UROBILINOGEN UR QL STRIP.AUTO: 0.2 EU/DL (ref 0.2–1)
VENTRICULAR RATE, ECG03: 65 BPM
WBC # BLD AUTO: 11.1 K/UL (ref 3.6–11)
WBC URNS QL MICRO: ABNORMAL /HPF (ref 0–4)

## 2019-12-30 PROCEDURE — 85025 COMPLETE CBC W/AUTO DIFF WBC: CPT

## 2019-12-30 PROCEDURE — 81001 URINALYSIS AUTO W/SCOPE: CPT

## 2019-12-30 PROCEDURE — 87077 CULTURE AEROBIC IDENTIFY: CPT

## 2019-12-30 PROCEDURE — 93005 ELECTROCARDIOGRAM TRACING: CPT

## 2019-12-30 PROCEDURE — 99284 EMERGENCY DEPT VISIT MOD MDM: CPT

## 2019-12-30 PROCEDURE — 87804 INFLUENZA ASSAY W/OPTIC: CPT

## 2019-12-30 PROCEDURE — 80053 COMPREHEN METABOLIC PANEL: CPT

## 2019-12-30 PROCEDURE — 87086 URINE CULTURE/COLONY COUNT: CPT

## 2019-12-30 PROCEDURE — 85379 FIBRIN DEGRADATION QUANT: CPT

## 2019-12-30 PROCEDURE — 36415 COLL VENOUS BLD VENIPUNCTURE: CPT

## 2019-12-30 PROCEDURE — 87186 SC STD MICRODIL/AGAR DIL: CPT

## 2019-12-30 RX ORDER — CEPHALEXIN 500 MG/1
500 CAPSULE ORAL 2 TIMES DAILY
Qty: 14 CAP | Refills: 0 | Status: SHIPPED | OUTPATIENT
Start: 2019-12-30 | End: 2020-01-06

## 2019-12-30 NOTE — ED PROVIDER NOTES
EMERGENCY DEPARTMENT HISTORY AND PHYSICAL EXAM      Date: 12/30/2019  Patient Name: Eddie Ryder    History of Presenting Illness     Chief Complaint   Patient presents with    Fatigue     c/o fatigue since left knee surgery on 12/10; also c/o associated fever; denies cough; c/o occasional burning with urination       History Provided By: Patient    HPI: Eddie Ryder, 78 y.o. female with PMHx significant for GERD, hyperlipidemia, status post total knee replacement on 12/10 who presents with a chief complaint of fatigue and low-grade fevers for the last 2 weeks. Also notes some mild dysuria. Patient denies any cough or cold symptoms, shortness of breath, chest pain, abdominal pain, nausea, vomiting, diarrhea. States that her knee is doing well and is not getting more painful or red. PCP: Ester Maldonado MD    There are no other complaints, changes, or physical findings at this time. Current Outpatient Medications   Medication Sig Dispense Refill    cephALEXin (KEFLEX) 500 mg capsule Take 1 Cap by mouth two (2) times a day for 7 days. 14 Cap 0    aspirin delayed-release 81 mg tablet Take 1 Tab by mouth two (2) times a day for 30 days. 60 Tab 0    senna-docusate (PERICOLACE) 8.6-50 mg per tablet Take 1 Tab by mouth daily. 30 Tab 0    vit C/E/Zn/coppr/lutein/zeaxan (PRESERVISION AREDS-2 PO) Take 1 Cap by mouth daily.  linaclotide (LINZESS PO) Take 290 mcg by mouth daily.  Omeprazole delayed release (PRILOSEC D/R) 20 mg tablet Take 1 Tab by mouth daily. Indications: a stomach ulcer 30 Tab 2    ondansetron (ZOFRAN ODT) 4 mg disintegrating tablet Take 1 Tab by mouth every eight (8) hours as needed for Nausea. 16 Tab 0    omega-3 fatty acids-vitamin e (FISH OIL) 1,000 mg cap Take 1 Cap by mouth.  Calcium-Cholecalciferol, D3, (CALCIUM 600 WITH VITAMIN D3) 600 mg(1,500mg) -400 unit chew Take 600 mg by mouth daily.       timolol maleate 0.5 % DrpD ophthalmic solution Administer 1 Drop to left eye nightly.  FLUOROMETHOLONE (FML FORTE OP) Apply 1 Drop to eye Daily (before breakfast). LEFT EYE      lisinopril (PRINIVIL, ZESTRIL) 20 mg tablet Take  by mouth daily.  lovastatin (MEVACOR) 20 mg tablet Take 20 mg by mouth nightly.  citalopram (CELEXA) 20 mg tablet Take 10 mg by mouth nightly. Past History     Past Medical History:  Past Medical History:   Diagnosis Date    Arrhythmia     ? Misty Nick    Colon polyps     Dr Karol Deras Diverticulitis     GERD (gastroesophageal reflux disease)     OCCASIONALLY    Hypertension     Ill-defined condition     stomach ulcer    Nausea & vomiting     Other ill-defined conditions(799.89)     HI CHOLESTEROL    Other ill-defined conditions(799.89)     DIVERTICULOSIS (HX: DIVERTICULITIS)    Psychiatric disorder     DEPRESSION     Past Surgical History:  Past Surgical History:   Procedure Laterality Date    ABDOMEN SURGERY PROC UNLISTED      LYSIS OF ADHESIONS    COLONOSCOPY N/A 11/13/2019    COLONOSCOPY performed by Margot Dailey [facility]    HX APPENDECTOMY      HX CATARACT REMOVAL      BILAT WITH IOL with bilateral lens implants    HX HYSTERECTOMY      HX KNEE ARTHROSCOPY      RIGHT KNEE     HX MOHS PROCEDURES      RIGHT SHOULDER--ROTATOR CUFF    HX OOPHORECTOMY      HX ORTHOPAEDIC  6/2009    RIGHT TKR    HX ORTHOPAEDIC Right     shoulder - torn rotatory cuff and bone spur    HX TUBAL LIGATION      HX UROLOGICAL      bladder sling    [place] COLONOSCOPY FLX DX W/COLLJ SPEC WHEN PFRMD  6/25/2012          Family History:  Family History   Problem Relation Age of Onset    Cancer Other         colon cancer    Post-op Nausea/Vomiting Sister     Breast Cancer Sister         over 48    Post-op Nausea/Vomiting Brother      Social History:  Social History     Tobacco Use    Smoking status: Never Smoker    Smokeless tobacco: Never Used   Substance Use Topics    Alcohol use:  Yes Comment: VERY RARE    Drug use: No     Allergies: Allergies   Allergen Reactions    Codeine Nausea and Vomiting    Nsaids (Non-Steroidal Anti-Inflammatory Drug) Other (comments)     Pt has stomach ulcer     Review of Systems   Review of Systems   Constitutional: Positive for fatigue. Negative for chills and fever. HENT: Negative for congestion, rhinorrhea and sore throat. Respiratory: Negative for cough and shortness of breath. Cardiovascular: Negative for chest pain. Gastrointestinal: Negative for abdominal pain, nausea and vomiting. Genitourinary: Negative for dysuria and urgency. Skin: Negative for rash. Neurological: Negative for dizziness, light-headedness and headaches. All other systems reviewed and are negative. Physical Exam   Physical Exam  Vitals signs and nursing note reviewed. Constitutional:       General: She is not in acute distress. Appearance: She is well-developed. HENT:      Head: Normocephalic and atraumatic. Eyes:      Conjunctiva/sclera: Conjunctivae normal.      Pupils: Pupils are equal, round, and reactive to light. Neck:      Musculoskeletal: Normal range of motion. Cardiovascular:      Rate and Rhythm: Normal rate and regular rhythm. Pulmonary:      Effort: Pulmonary effort is normal. No respiratory distress. Breath sounds: Normal breath sounds. No stridor. Abdominal:      General: There is no distension. Palpations: Abdomen is soft. Tenderness: There is no tenderness. Musculoskeletal: Normal range of motion. Comments: L knee incision c/d/i, no surrounding erythema, no pain with ROM   Skin:     General: Skin is warm and dry. Neurological:      Mental Status: She is alert and oriented to person, place, and time.        Diagnostic Study Results   Labs -     Recent Results (from the past 12 hour(s))   CBC WITH AUTOMATED DIFF    Collection Time: 12/30/19 11:35 AM   Result Value Ref Range    WBC 11.1 (H) 3.6 - 11.0 K/uL    RBC 3.56 (L) 3.80 - 5.20 M/uL    HGB 9.8 (L) 11.5 - 16.0 g/dL    HCT 31.6 (L) 35.0 - 47.0 %    MCV 88.8 80.0 - 99.0 FL    MCH 27.5 26.0 - 34.0 PG    MCHC 31.0 30.0 - 36.5 g/dL    RDW 16.5 (H) 11.5 - 14.5 %    PLATELET 973 (H) 287 - 400 K/uL    MPV 10.5 8.9 - 12.9 FL    NRBC 0.0 0  WBC    ABSOLUTE NRBC 0.00 0.00 - 0.01 K/uL    NEUTROPHILS 68 32 - 75 %    LYMPHOCYTES 19 12 - 49 %    MONOCYTES 9 5 - 13 %    EOSINOPHILS 3 0 - 7 %    BASOPHILS 0 0 - 1 %    IMMATURE GRANULOCYTES 1 (H) 0.0 - 0.5 %    ABS. NEUTROPHILS 7.6 1.8 - 8.0 K/UL    ABS. LYMPHOCYTES 2.1 0.8 - 3.5 K/UL    ABS. MONOCYTES 1.0 0.0 - 1.0 K/UL    ABS. EOSINOPHILS 0.3 0.0 - 0.4 K/UL    ABS. BASOPHILS 0.0 0.0 - 0.1 K/UL    ABS. IMM. GRANS. 0.1 (H) 0.00 - 0.04 K/UL    DF AUTOMATED     METABOLIC PANEL, COMPREHENSIVE    Collection Time: 12/30/19 11:35 AM   Result Value Ref Range    Sodium 137 136 - 145 mmol/L    Potassium 4.4 3.5 - 5.1 mmol/L    Chloride 103 97 - 108 mmol/L    CO2 27 21 - 32 mmol/L    Anion gap 7 5 - 15 mmol/L    Glucose 94 65 - 100 mg/dL    BUN 28 (H) 6 - 20 MG/DL    Creatinine 0.99 0.55 - 1.02 MG/DL    BUN/Creatinine ratio 28 (H) 12 - 20      GFR est AA >60 >60 ml/min/1.73m2    GFR est non-AA 54 (L) >60 ml/min/1.73m2    Calcium 9.2 8.5 - 10.1 MG/DL    Bilirubin, total 0.9 0.2 - 1.0 MG/DL    ALT (SGPT) 22 12 - 78 U/L    AST (SGOT) 16 15 - 37 U/L    Alk.  phosphatase 71 45 - 117 U/L    Protein, total 8.2 6.4 - 8.2 g/dL    Albumin 3.7 3.5 - 5.0 g/dL    Globulin 4.5 (H) 2.0 - 4.0 g/dL    A-G Ratio 0.8 (L) 1.1 - 2.2     URINALYSIS W/ REFLEX CULTURE    Collection Time: 12/30/19 11:36 AM   Result Value Ref Range    Color YELLOW/STRAW      Appearance CLEAR CLEAR      Specific gravity 1.027 1.003 - 1.030      pH (UA) 5.5 5.0 - 8.0      Protein NEGATIVE  NEG mg/dL    Glucose NEGATIVE  NEG mg/dL    Ketone NEGATIVE  NEG mg/dL    Bilirubin NEGATIVE  NEG      Blood NEGATIVE  NEG      Urobilinogen 0.2 0.2 - 1.0 EU/dL    Nitrites NEGATIVE  NEG Leukocyte Esterase SMALL (A) NEG      WBC 5-10 0 - 4 /hpf    RBC 5-10 0 - 5 /hpf    Epithelial cells FEW FEW /lpf    Bacteria NEGATIVE  NEG /hpf    UA:UC IF INDICATED CULTURE NOT INDICATED BY UA RESULT CNI     D DIMER    Collection Time: 12/30/19 12:12 PM   Result Value Ref Range    D-dimer 8.65 (H) 0.00 - 0.65 mg/L FEU   INFLUENZA A & B AG (RAPID TEST)    Collection Time: 12/30/19 12:12 PM   Result Value Ref Range    Influenza A Antigen NEGATIVE  NEG      Influenza B Antigen NEGATIVE  NEG     EKG, 12 LEAD, INITIAL    Collection Time: 12/30/19 12:13 PM   Result Value Ref Range    Ventricular Rate 65 BPM    Atrial Rate 65 BPM    P-R Interval 146 ms    QRS Duration 82 ms    Q-T Interval 398 ms    QTC Calculation (Bezet) 413 ms    Calculated P Axis 38 degrees    Calculated R Axis 69 degrees    Calculated T Axis 71 degrees    Diagnosis       Normal sinus rhythm  Normal ECG  Confirmed by Arlen Castellon ([id number]) on 12/30/2019 1:43:19 PM         Radiologic Studies -   No orders to display     No results found. Medical Decision Making   I am the first provider for this patient. I reviewed the vital signs, available nursing notes, past medical history, past surgical history, family history and social history. Vital Signs-Reviewed the patient's vital signs. Patient Vitals for the past 12 hrs:   Pulse BP SpO2   12/30/19 1400 69 132/55    12/30/19 1345 62  100 %   12/30/19 1330 63 117/51    12/30/19 1300 64 126/56        Pulse Oximetry Analysis - 100% on ra      Records Reviewed: Nursing Notes and Old Medical Records    Provider Notes (Medical Decision Making):   Patient presents with 2 weeks of fatigue as well as reported low-grade temps at home. Patient afebrile here. Prior to my evaluation patient had lab work ordered as well as a urinalysis and a d-dimer. Urinalysis does have a small amount of WBCs in it. Given the patient's fatigue and low-grade temps at home as well as dysuria, will treat for UTI. D-dimer was significantly elevated at 8, however the patient is recently postop and has no signs or symptoms concerning for DVT or PE. Discussed this with the patient who states she does not want to get a CT scan at this time. ED Course:   Initial assessment performed. The patients presenting problems have been discussed, and they are in agreement with the care plan formulated and outlined with them. I have encouraged them to ask questions as they arise throughout their visit. Critical Care:  none    Disposition:  Discharge Note:  2:08 PM  The patient has been re-evaluated and is ready for discharge. Reviewed available results with patient. Counseled patient on diagnosis and care plan. Patient has expressed understanding, and all questions have been answered. Patient agrees with plan and agrees to follow up as recommended, or to return to the ED if their symptoms worsen. Discharge instructions have been provided and explained to the patient, along with reasons to return to the ED. PLAN:  1. Discharge Medication List as of 12/30/2019  2:08 PM      START taking these medications    Details   cephALEXin (KEFLEX) 500 mg capsule Take 1 Cap by mouth two (2) times a day for 7 days. , Normal, Disp-14 Cap, R-0         CONTINUE these medications which have NOT CHANGED    Details   aspirin delayed-release 81 mg tablet Take 1 Tab by mouth two (2) times a day for 30 days. , Print, Disp-60 Tab, R-0      senna-docusate (PERICOLACE) 8.6-50 mg per tablet Take 1 Tab by mouth daily. , Print, Disp-30 Tab, R-0      vit C/E/Zn/coppr/lutein/zeaxan (PRESERVISION AREDS-2 PO) Take 1 Cap by mouth daily. , Historical Med      linaclotide (LINZESS PO) Take 290 mcg by mouth daily. , Historical Med      Omeprazole delayed release (PRILOSEC D/R) 20 mg tablet Take 1 Tab by mouth daily.  Indications: a stomach ulcer, Print, Disp-30 Tab, R-2      ondansetron (ZOFRAN ODT) 4 mg disintegrating tablet Take 1 Tab by mouth every eight (8) hours as needed for Nausea., Normal, Disp-16 Tab, R-0      omega-3 fatty acids-vitamin e (FISH OIL) 1,000 mg cap Take 1 Cap by mouth., Historical Med      Calcium-Cholecalciferol, D3, (CALCIUM 600 WITH VITAMIN D3) 600 mg(1,500mg) -400 unit chew Take 600 mg by mouth daily. , Historical Med      timolol maleate 0.5 % DrpD ophthalmic solution Administer 1 Drop to left eye nightly., Historical Med      FLUOROMETHOLONE (FML FORTE OP) Apply 1 Drop to eye Daily (before breakfast). LEFT EYE, Historical Med      lisinopril (PRINIVIL, ZESTRIL) 20 mg tablet Take  by mouth daily. , Historical Med      lovastatin (MEVACOR) 20 mg tablet Take 20 mg by mouth nightly., Historical Med      citalopram (CELEXA) 20 mg tablet Take 10 mg by mouth nightly., Historical Med           2. Follow-up Information     Follow up With Specialties Details Why Contact Info    Gus Alvarez MD Family Practice Schedule an appointment as soon as possible for a visit  [place] [phone]      [facility] EMERGENCY DEPT Emergency Medicine  As needed, If symptoms worsen [address] [place] [address] Drive  [address] [facility]  [phone]        Return to ED if worse     Diagnosis     Clinical Impression:   1. Fatigue, unspecified type    2. Dysuria        This note will not be viewable in [employer]. Please note that this dictation was completed with [employer], the computer voice recognition software. Quite often unanticipated grammatical, syntax, homophones, and other interpretive errors are inadvertently transcribed by the computer software. Please disregard these errors.   Please excuse any errors that have escaped final proofreading

## 2019-12-30 NOTE — DISCHARGE INSTRUCTIONS
Patient Education        Painful Urination (Dysuria): Care Instructions  Your Care Instructions  Burning pain with urination (dysuria) is a common symptom of a urinary tract infection or other urinary problems. The bladder may become inflamed. This can cause pain when the bladder fills and empties. You may also feel pain if the tube that carries urine from the bladder to the outside of the body (urethra) gets irritated or infected. Sexually transmitted infections (STIs) also may cause pain when you urinate. Sometimes the pain can be caused by things other than an infection. The urethra can be irritated by soaps, perfumes, or foreign objects in the urethra. Kidney stones can cause pain when they pass through the urethra. The cause may be hard to find. You may need tests. Treatment for painful urination depends on the cause. Follow-up care is a key part of your treatment and safety. Be sure to make and go to all appointments, and call your doctor if you are having problems. It's also a good idea to know your test results and keep a list of the medicines you take. How can you care for yourself at home? · Drink extra water for the next day or two. This will help make the urine less concentrated. (If you have kidney, heart, or liver disease and have to limit fluids, talk with your doctor before you increase the amount of fluids you drink.)  · Avoid drinks that are carbonated or have caffeine. They can irritate the bladder. · Urinate often. Try to empty your bladder each time. For women:  · Urinate right after you have sex. · After going to the bathroom, wipe from front to back. · Avoid douches, bubble baths, and feminine hygiene sprays. And avoid other feminine hygiene products that have deodorants. When should you call for help? Call your doctor now or seek immediate medical care if:    · You have new symptoms, such as fever, nausea, or vomiting.     · You have new or worse symptoms of a urinary problem.  For example:  ? You have blood or pus in your urine. ? You have chills or body aches. ? It hurts worse to urinate. ? You have groin or belly pain. ? You have pain in your back just below your rib cage (the flank area).    Watch closely for changes in your health, and be sure to contact your doctor if you have any problems. Where can you learn more? Go to http://jf-abel.info/. Enter N320 in the search box to learn more about \"Painful Urination (Dysuria): Care Instructions. \"  Current as of: December 19, 2018  Content Version: 12.2  © 1565-7355 [employer]. Care instructions adapted under license by "[employer] (which disclaims liability or warranty for this information). If you have questions about a medical condition or this instruction, always ask your healthcare professional. [address] any warranty or liability for your use of this information.

## 2020-01-01 LAB
BACTERIA SPEC CULT: ABNORMAL
CC UR VC: ABNORMAL
SERVICE CMNT-IMP: ABNORMAL

## 2020-04-07 NOTE — PERIOP NOTES
0464 - patient ready for the OR from pre op stand point, still needs to see physician [facility]    HOME HEALTH ORDERS  FACE TO FACE ENCOUNTER    Patient Name: Daisha Mayo  YOB: 1946    PCP: Odilia Eckert MD   PCP Address: [address] [facility] / [place] [address]  PCP Phone Number: [phone]  PCP Fax: [phone]    Encounter Date: 04/07/2020    Admit to Home Health    Diagnoses:  Active Hospital Problems    Diagnosis  POA    *COVID-19 virus infection [U07.1]  Yes    Adult failure to thrive [R62.7]  Unknown    Encephalopathy [G93.40]  Unknown    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       Future Appointments   Date Time Provider Department Center   4/8/2020 10:00 AM [facility], GERIATRIC CLIN[facility] 1 [facility] PSYCH [facility]   4/13/2020 10:30 AM Kasey Lazo MD [facility] NEURO [facility]   6/5/2020  8:00 AM Susana Osman NP [address]   6/15/2020 10:00 AM Odilia Eckert MD [facility]     Follow-up Information     Odilia Eckert MD.    Specialty:  Internal Medicine  Why:  Outpatient Services  Contact information:  [address] [facility]  [place] [address]  [phone]             Odilia Eckert MD In 2 weeks.    Specialty:  Internal Medicine  Contact information:  [address] [facility]  [place] [address]  [phone]                     I have seen and examined this patient face to face today. My clinical findings that support the need for the home health skilled services and home bound status are the following:  Weakness/numbness causing balance and gait disturbance due to Weakness/Debility making it taxing to leave home.    Allergies:  Review of patient's allergies indicates:   Allergen Reactions    Latex      Other reaction(s): Rash  Other  reaction(s): Rash       Diet: regular diet    Activities: activity as tolerated    Nursing:   SN to complete comprehensive assessment including routine vital signs. Instruct on disease process and s/s of complications to report to MD. Review/verify medication list sent home with the patient at time of discharge  and instruct patient/caregiver as needed. Frequency may be adjusted depending on start of care date.    Notify MD if SBP > 160 or < 90; DBP > 90 or < 50; HR > 120 or < 50; Temp > 101; Other:         CONSULTS:    Physical Therapy to evaluate and treat. Evaluate for home safety and equipment needs; Establish/upgrade home exercise program. Perform / instruct on therapeutic exercises, gait training, transfer training, and Range of Motion.    MISCELLANEOUS CARE:  N/A    WOUND CARE ORDERS  n/a      Medications: Review discharge medications with patient and family and provide education.      Current Discharge Medication List      CONTINUE these medications which have NOT CHANGED    Details   ASCORBATE CALCIUM (VITAMIN C ORAL) Take 1,000 mg by mouth once daily.       azithromycin (Z-DEYANIRA) 250 MG tablet Take 1 tablet (250 mg total) by mouth once daily. Take first 2 tablets together, then 1 every day until finished.  Qty: 6 tablet, Refills: 0      !! cholecalciferol, vitamin D3, 50,000 unit Tab Take 1 tablet by mouth every 7 days.  Qty: 12 tablet, Refills: 4      cyanocobalamin (VITAMIN B-12) 250 MCG tablet Take 1,000 mcg by mouth daily as needed. 1 Tablet Oral Every day      dorzolamide-timolol (COSOPT) 2-0.5 % ophthalmic solution Place 1 drop into both eyes 2 (two) times daily.        glycerin adult suppository Place 1 suppository rectally as needed for Constipation.      hydroxychloroquine (PLAQUENIL) 100 mg tablet Take by mouth 2 (two) times daily.      latanoprost 0.005 % ophthalmic solution Place 1 drop into both eyes every evening.      levothyroxine (SYNTHROID) 25 MCG tablet TAKE 1 TABLET ONE TIME DAILY  Qty:  90 tablet, Refills: 3    Associated Diagnoses: Subclinical hypothyroidism      multivitamin with minerals (HAIR,SKIN AND NAILS ORAL) Take 1 tablet by mouth once daily.      multivitamin-minerals-lutein (CENTRUM SILVER) Tab Take 1 tablet by mouth Daily. 1 Tablet Oral Every day      nystatin (MYCOSTATIN) 100,000 unit/mL suspension Take 5 mLs (500,000 Units total) by mouth 4 (four) times daily with meals and nightly. for 10 days  Qty: 200 mL, Refills: 0      omega-3 fatty acids-vitamin E (FISH OIL) 1,000 mg Cap       ondansetron (ZOFRAN-ODT) 4 MG TbDL Take 4 mg by mouth every 6 (six) hours as needed.      !! VITAMIN D3 5,000 unit Tab       atorvastatin (LIPITOR) 10 MG tablet TAKE 1 TABLET (10 MG TOTAL) BY MOUTH ONCE DAILY.  Qty: 90 tablet, Refills: 3       !! - Potential duplicate medications found. Please discuss with provider.          I certify that this patient is confined to her home and needs physical therapy.    Gamaliel Jane

## 2020-08-05 ENCOUNTER — HOSPITAL ENCOUNTER (OUTPATIENT)
Dept: BONE DENSITY | Age: 80
Discharge: HOME | End: 2020-08-05
Attending: NURSE PRACTITIONER
Payer: MEDICARE

## 2020-08-05 ENCOUNTER — HOSPITAL ENCOUNTER (OUTPATIENT)
Dept: MAMMOGRAPHY | Age: 80
Discharge: HOME | End: 2020-08-05
Attending: FAMILY MEDICINE
Payer: MEDICARE

## 2020-08-05 DIAGNOSIS — M81.0 OSTEOPOROSIS: ICD-10-CM

## 2020-08-05 DIAGNOSIS — Z12.31 VISIT FOR SCREENING MAMMOGRAM: ICD-10-CM

## 2020-08-05 DIAGNOSIS — Z78.0 POSTMENOPAUSAL: ICD-10-CM

## 2020-08-05 PROCEDURE — 77067 SCR MAMMO BI INCL CAD: CPT

## 2020-08-05 PROCEDURE — 77080 DXA BONE DENSITY AXIAL: CPT

## 2021-07-28 ENCOUNTER — TRANSCRIBE ORDER (OUTPATIENT)
Dept: SCHEDULING | Age: 81
End: 2021-07-28

## 2021-07-28 DIAGNOSIS — Z12.31 OTHER SCREENING MAMMOGRAM: Primary | ICD-10-CM

## 2021-09-02 ENCOUNTER — HOSPITAL ENCOUNTER (OUTPATIENT)
Dept: MAMMOGRAPHY | Age: 81
Discharge: HOME | End: 2021-09-02
Attending: FAMILY MEDICINE
Payer: MEDICARE

## 2021-09-02 DIAGNOSIS — Z12.31 OTHER SCREENING MAMMOGRAM: ICD-10-CM

## 2021-09-02 PROCEDURE — 77067 SCR MAMMO BI INCL CAD: CPT

## 2022-03-18 PROBLEM — M17.12 OSTEOARTHROSIS, LOCALIZED, PRIMARY, KNEE, LEFT: Status: ACTIVE | Noted: 2019-12-10

## 2022-03-19 PROBLEM — M00.9 SEPTIC ARTHRITIS (HCC): Status: ACTIVE | Noted: 2019-08-30

## 2022-06-28 ENCOUNTER — TRANSCRIBE ORDER (OUTPATIENT)
Dept: SCHEDULING | Age: 82
End: 2022-06-28

## 2022-06-28 DIAGNOSIS — M54.9 BACK PAIN: Primary | ICD-10-CM

## 2022-07-17 ENCOUNTER — HOSPITAL ENCOUNTER (OUTPATIENT)
Dept: MRI IMAGING | Age: 82
Discharge: HOME | End: 2022-07-17
Attending: NURSE PRACTITIONER
Payer: MEDICARE

## 2022-07-17 DIAGNOSIS — M54.9 BACK PAIN: ICD-10-CM

## 2022-07-17 PROCEDURE — 72158 MRI LUMBAR SPINE W/O & W/DYE: CPT

## 2022-07-17 PROCEDURE — 72146 MRI CHEST SPINE W/O DYE: CPT

## 2022-07-17 PROCEDURE — A9576 INJ PROHANCE MULTIPACK: HCPCS

## 2022-07-17 PROCEDURE — 74011250636 HC RX REV CODE- 250/636

## 2022-07-17 RX ADMIN — GADOTERIDOL 14 ML: 279.3 INJECTION, SOLUTION INTRAVENOUS at 14:45

## 2022-08-16 ENCOUNTER — TRANSCRIBE ORDER (OUTPATIENT)
Dept: SCHEDULING | Age: 82
End: 2022-08-16

## 2022-08-16 DIAGNOSIS — Z12.31 SCREENING MAMMOGRAM FOR HIGH-RISK PATIENT: Primary | ICD-10-CM

## 2022-08-18 ENCOUNTER — TRANSCRIBE ORDER (OUTPATIENT)
Dept: SCHEDULING | Age: 82
End: 2022-08-18

## 2022-08-18 DIAGNOSIS — Z78.0 POST-MENOPAUSE: Primary | ICD-10-CM

## 2022-09-09 ENCOUNTER — HOSPITAL ENCOUNTER (OUTPATIENT)
Dept: MAMMOGRAPHY | Age: 82
Discharge: HOME | End: 2022-09-09
Attending: FAMILY MEDICINE
Payer: MEDICARE

## 2022-09-09 DIAGNOSIS — Z12.31 SCREENING MAMMOGRAM FOR HIGH-RISK PATIENT: ICD-10-CM

## 2022-09-09 PROCEDURE — 77067 SCR MAMMO BI INCL CAD: CPT

## 2023-04-21 DIAGNOSIS — Z78.0 POST-MENOPAUSE: Primary | ICD-10-CM

## 2023-09-21 ENCOUNTER — HOSPITAL ENCOUNTER (OUTPATIENT)
Facility: HOSPITAL | Age: 83
Discharge: HOME | End: 2023-09-21
Payer: MEDICARE

## 2023-09-21 DIAGNOSIS — Z12.31 VISIT FOR SCREENING MAMMOGRAM: ICD-10-CM

## 2023-09-21 PROCEDURE — 77063 BREAST TOMOSYNTHESIS BI: CPT

## 2024-02-22 ENCOUNTER — HOSPITAL ENCOUNTER (EMERGENCY)
Facility: HOSPITAL | Age: 84
Discharge: HOME | End: 2024-02-22
Attending: EMERGENCY MEDICINE
Payer: MEDICARE

## 2024-02-22 ENCOUNTER — APPOINTMENT (OUTPATIENT)
Facility: HOSPITAL | Age: 84
End: 2024-02-22
Payer: MEDICARE

## 2024-02-22 VITALS
RESPIRATION RATE: 20 BRPM | SYSTOLIC BLOOD PRESSURE: 147 MMHG | DIASTOLIC BLOOD PRESSURE: 64 MMHG | HEART RATE: 107 BPM | TEMPERATURE: 98.2 F | WEIGHT: 151.9 LBS | OXYGEN SATURATION: 98 %

## 2024-02-22 DIAGNOSIS — K52.9 ACUTE GASTROENTERITIS: Primary | ICD-10-CM

## 2024-02-22 LAB
ALBUMIN SERPL-MCNC: 3.2 G/DL (ref 3.5–5)
ALBUMIN/GLOB SERPL: 0.7 (ref 1.1–2.2)
ALP SERPL-CCNC: 64 U/L (ref 45–117)
ALT SERPL-CCNC: 22 U/L (ref 12–78)
ANION GAP SERPL CALC-SCNC: 5 MMOL/L (ref 5–15)
APPEARANCE UR: ABNORMAL
AST SERPL-CCNC: 16 U/L (ref 15–37)
BACTERIA URNS QL MICRO: ABNORMAL /HPF
BASOPHILS # BLD: 0 K/UL (ref 0–0.1)
BASOPHILS NFR BLD: 0 % (ref 0–1)
BILIRUB SERPL-MCNC: 0.4 MG/DL (ref 0.2–1)
BILIRUB UR QL: NEGATIVE
BUN SERPL-MCNC: 29 MG/DL (ref 6–20)
BUN/CREAT SERPL: 31 (ref 12–20)
CALCIUM SERPL-MCNC: 8.8 MG/DL (ref 8.5–10.1)
CHLORIDE SERPL-SCNC: 107 MMOL/L (ref 97–108)
CO2 SERPL-SCNC: 26 MMOL/L (ref 21–32)
COLOR UR: ABNORMAL
CREAT SERPL-MCNC: 0.94 MG/DL (ref 0.55–1.02)
DIFFERENTIAL METHOD BLD: ABNORMAL
EOSINOPHIL # BLD: 0 K/UL (ref 0–0.4)
EOSINOPHIL NFR BLD: 0 % (ref 0–7)
EPITH CASTS URNS QL MICRO: ABNORMAL /LPF
ERYTHROCYTE [DISTWIDTH] IN BLOOD BY AUTOMATED COUNT: 14.3 % (ref 11.5–14.5)
GLOBULIN SER CALC-MCNC: 4.9 G/DL (ref 2–4)
GLUCOSE SERPL-MCNC: 106 MG/DL (ref 65–100)
GLUCOSE UR STRIP.AUTO-MCNC: NEGATIVE MG/DL
HCT VFR BLD AUTO: 39.5 % (ref 35–47)
HGB BLD-MCNC: 12.8 G/DL (ref 11.5–16)
HGB UR QL STRIP: NEGATIVE
IMM GRANULOCYTES # BLD AUTO: 0.1 K/UL (ref 0–0.04)
IMM GRANULOCYTES NFR BLD AUTO: 1 % (ref 0–0.5)
KETONES UR QL STRIP.AUTO: NEGATIVE MG/DL
LACTATE BLD-SCNC: 1 MMOL/L (ref 0.4–2)
LACTATE SERPL-SCNC: 2.1 MMOL/L (ref 0.4–2)
LEUKOCYTE ESTERASE UR QL STRIP.AUTO: ABNORMAL
LIPASE SERPL-CCNC: 23 U/L (ref 13–75)
LYMPHOCYTES # BLD: 1.1 K/UL (ref 0.8–3.5)
LYMPHOCYTES NFR BLD: 6 % (ref 12–49)
MCH RBC QN AUTO: 28.9 PG (ref 26–34)
MCHC RBC AUTO-ENTMCNC: 32.4 G/DL (ref 30–36.5)
MCV RBC AUTO: 89.2 FL (ref 80–99)
MONOCYTES # BLD: 1.5 K/UL (ref 0–1)
MONOCYTES NFR BLD: 8 % (ref 5–13)
NEUTS SEG # BLD: 15.4 K/UL (ref 1.8–8)
NEUTS SEG NFR BLD: 85 % (ref 32–75)
NITRITE UR QL STRIP.AUTO: NEGATIVE
NRBC # BLD: 0 K/UL (ref 0–0.01)
NRBC BLD-RTO: 0 PER 100 WBC
PH UR STRIP: 6.5 (ref 5–8)
PLATELET # BLD AUTO: 387 K/UL (ref 150–400)
PMV BLD AUTO: 11.5 FL (ref 8.9–12.9)
POTASSIUM SERPL-SCNC: 4.2 MMOL/L (ref 3.5–5.1)
PROT SERPL-MCNC: 8.1 G/DL (ref 6.4–8.2)
PROT UR STRIP-MCNC: 30 MG/DL
RBC # BLD AUTO: 4.43 M/UL (ref 3.8–5.2)
RBC #/AREA URNS HPF: ABNORMAL /HPF (ref 0–5)
SODIUM SERPL-SCNC: 138 MMOL/L (ref 136–145)
SP GR UR REFRACTOMETRY: 1.02
URINE CULTURE IF INDICATED: ABNORMAL
UROBILINOGEN UR QL STRIP.AUTO: 0.2 EU/DL (ref 0.2–1)
WBC # BLD AUTO: 18.2 K/UL (ref 3.6–11)
WBC URNS QL MICRO: ABNORMAL /HPF (ref 0–4)

## 2024-02-22 PROCEDURE — 83690 ASSAY OF LIPASE: CPT

## 2024-02-22 PROCEDURE — 2580000003 HC RX 258: Performed by: EMERGENCY MEDICINE

## 2024-02-22 PROCEDURE — 96375 TX/PRO/DX INJ NEW DRUG ADDON: CPT

## 2024-02-22 PROCEDURE — 81001 URINALYSIS AUTO W/SCOPE: CPT

## 2024-02-22 PROCEDURE — 6360000002 HC RX W HCPCS: Performed by: EMERGENCY MEDICINE

## 2024-02-22 PROCEDURE — 80053 COMPREHEN METABOLIC PANEL: CPT

## 2024-02-22 PROCEDURE — 74177 CT ABD & PELVIS W/CONTRAST: CPT

## 2024-02-22 PROCEDURE — 96366 THER/PROPH/DIAG IV INF ADDON: CPT

## 2024-02-22 PROCEDURE — 93005 ELECTROCARDIOGRAM TRACING: CPT | Performed by: EMERGENCY MEDICINE

## 2024-02-22 PROCEDURE — 83605 ASSAY OF LACTIC ACID: CPT

## 2024-02-22 PROCEDURE — 36415 COLL VENOUS BLD VENIPUNCTURE: CPT

## 2024-02-22 PROCEDURE — 85025 COMPLETE CBC W/AUTO DIFF WBC: CPT

## 2024-02-22 PROCEDURE — 4500000002 HC ER NO CHARGE

## 2024-02-22 PROCEDURE — 96365 THER/PROPH/DIAG IV INF INIT: CPT

## 2024-02-22 PROCEDURE — 99285 EMERGENCY DEPT VISIT HI MDM: CPT

## 2024-02-22 PROCEDURE — 6360000004 HC RX CONTRAST MEDICATION: Performed by: RADIOLOGY

## 2024-02-22 RX ORDER — ONDANSETRON 2 MG/ML
4 INJECTION INTRAMUSCULAR; INTRAVENOUS ONCE
Status: COMPLETED | OUTPATIENT
Start: 2024-02-22 | End: 2024-02-22

## 2024-02-22 RX ORDER — 0.9 % SODIUM CHLORIDE 0.9 %
1000 INTRAVENOUS SOLUTION INTRAVENOUS ONCE
Status: COMPLETED | OUTPATIENT
Start: 2024-02-22 | End: 2024-02-22

## 2024-02-22 RX ADMIN — ONDANSETRON 4 MG: 2 INJECTION INTRAMUSCULAR; INTRAVENOUS at 14:29

## 2024-02-22 RX ADMIN — IOPAMIDOL 100 ML: 755 INJECTION, SOLUTION INTRAVENOUS at 15:22

## 2024-02-22 RX ADMIN — SODIUM CHLORIDE 1000 ML: 9 INJECTION, SOLUTION INTRAVENOUS at 14:29

## 2024-02-22 RX ADMIN — SODIUM CHLORIDE 1000 MG: 900 INJECTION INTRAVENOUS at 15:47

## 2024-02-22 ASSESSMENT — PAIN SCALES - GENERAL: PAINLEVEL_OUTOF10: 3

## 2024-02-22 ASSESSMENT — LIFESTYLE VARIABLES
HOW MANY STANDARD DRINKS CONTAINING ALCOHOL DO YOU HAVE ON A TYPICAL DAY: PATIENT DOES NOT DRINK
HOW OFTEN DO YOU HAVE A DRINK CONTAINING ALCOHOL: NEVER

## 2024-02-24 LAB
EKG ATRIAL RATE: 93 BPM
EKG DIAGNOSIS: NORMAL
EKG P AXIS: 67 DEGREES
EKG P-R INTERVAL: 154 MS
EKG Q-T INTERVAL: 364 MS
EKG QRS DURATION: 84 MS
EKG QTC CALCULATION (BAZETT): 452 MS
EKG R AXIS: 64 DEGREES
EKG T AXIS: 39 DEGREES
EKG VENTRICULAR RATE: 93 BPM

## 2024-02-24 ASSESSMENT — ENCOUNTER SYMPTOMS
NAUSEA: 1
DIARRHEA: 1
COUGH: 1
ABDOMINAL PAIN: 0
VOMITING: 1

## 2024-02-24 NOTE — ED PROVIDER NOTES
EMERGENCY DEPARTMENT HISTORY AND PHYSICAL EXAM    Date: 2/22/2024  Patient Name: Bandar Montano  Patient Age and Sex: 83 y.o. female  MRN:  [id number]  CSN:  [id number]    History of Present Illness     Chief Complaint   Patient presents with    Abdominal Pain     With nausea, vomiting and diarrhea starting early this morning, history of diverticulitis    Cough     Pt presents with deep cough, states she has had it for weeks       History Provided By: Patient    Ability to gather history was limited by:     HPI: Bandar Montano, 83 y.o. female   With history of diverticulitis, arthritis, complains of nausea and vomiting and diarrhea starting about 6 hours ago, no significant abdominal pain.  No fevers.  Also cough which she has had for weeks to months.        Tobacco Use      Smoking status: Never      Smokeless tobacco: Never     Past History   The patient's medical, surgical, and social history were reviewed by me today.    Current Medications:  No current facility-administered medications on file prior to encounter.     Current Outpatient Medications on File Prior to Encounter   Medication Sig Dispense Refill    Calcium Carb-Cholecalciferol 600-20 MG-MCG CHEW Take by mouth daily      citalopram (CELEXA) 20 MG tablet Take by mouth      lisinopril (PRINIVIL;ZESTRIL) 20 MG tablet Take by mouth daily      lovastatin (MEVACOR) 20 MG tablet Take by mouth      omeprazole (PRILOSEC OTC) 20 MG tablet Take by mouth daily      ondansetron (ZOFRAN-ODT) 4 MG disintegrating tablet Take by mouth every 8 hours as needed      senna-docusate (PERICOLACE) 8.6-50 MG per tablet Take 1 tablet by mouth daily      Timolol (TIMOPTIC) 0.5 % SOLN ophthalmic solution Apply 1 drop to eye         Past Medical History:   Diagnosis Date    Arrhythmia     ?IRREGULAR HEARTBEAT    Arthritis     BACK    Colon polyps     Dr Marcelo    Diverticulitis     GERD (gastroesophageal reflux disease)     OCCASIONALLY    Hypertension     Ill-defined  condition     stomach ulcer    Nausea & vomiting     Other ill-defined conditions(799.89)     DIVERTICULOSIS (HX: DIVERTICULITIS)    Other ill-defined conditions(799.89)     HI CHOLESTEROL    Psychiatric disorder     DEPRESSION       Past Surgical History:   Procedure Laterality Date    APPENDECTOMY      CATARACT REMOVAL      BILAT WITH IOL with bilateral lens implants    COLONOSCOPY N/A 11/13/2019    COLONOSCOPY performed by Demarco Urbina MD at [facility] ENDOSCOPY    COLONOSCOPY FLX DX W/COLLJ SPEC WHEN PFRMD  6/25/2012         HYSTERECTOMY (CERVIX STATUS UNKNOWN)      KNEE ARTHROSCOPY      RIGHT KNEE     MOHS SURGERY      RIGHT SHOULDER--ROTATOR CUFF    ORTHOPEDIC SURGERY Right     shoulder - torn rotatory cuff and bone spur    ORTHOPEDIC SURGERY  6/2009    RIGHT TKR    OVARY REMOVAL      FL UNLISTED PROCEDURE ABDOMEN PERITONEUM & OMENTUM      LYSIS OF ADHESIONS    TUBAL LIGATION      UROLOGICAL SURGERY      bladder sling       Social History     Tobacco Use    Smoking status: Never    Smokeless tobacco: Never   Substance Use Topics    Alcohol use: Yes    Drug use: No       Allergies:  Allergies   Allergen Reactions    Nsaids Other (See Comments)     Pt has stomach ulcer    Codeine Nausea And Vomiting     Review of Systems   A Review of Systems was reviewed by me today during this encounter.  Pertinent positive and negative elements are noted in the HPI and MDM sections.    Review of Systems   Constitutional:  Negative for fatigue and fever.   Respiratory:  Positive for cough.    Gastrointestinal:  Positive for diarrhea, nausea and vomiting. Negative for abdominal pain.   All other systems reviewed and are negative.      Physical Exam   Vital Signs  No data found.       Physical Exam  Vitals reviewed.   Constitutional:       General: She is not in acute distress.     Appearance: Normal appearance. She is not ill-appearing.   Cardiovascular:      Rate and Rhythm: Normal rate and regular rhythm.   Pulmonary:

## 2024-03-30 ENCOUNTER — HOSPITAL ENCOUNTER (INPATIENT)
Facility: HOSPITAL | Age: 84
LOS: 3 days | Discharge: HOME | DRG: 871 | End: 2024-04-02
Attending: STUDENT IN AN ORGANIZED HEALTH CARE EDUCATION/TRAINING PROGRAM | Admitting: INTERNAL MEDICINE
Payer: MEDICARE

## 2024-03-30 ENCOUNTER — APPOINTMENT (OUTPATIENT)
Facility: HOSPITAL | Age: 84
DRG: 871 | End: 2024-03-30
Payer: MEDICARE

## 2024-03-30 DIAGNOSIS — J18.9 PNEUMONIA OF BOTH LUNGS DUE TO INFECTIOUS ORGANISM, UNSPECIFIED PART OF LUNG: Primary | ICD-10-CM

## 2024-03-30 LAB
ALBUMIN SERPL-MCNC: 2.8 G/DL (ref 3.5–5)
ALBUMIN/GLOB SERPL: 0.5 (ref 1.1–2.2)
ALP SERPL-CCNC: 74 U/L (ref 45–117)
ALT SERPL-CCNC: 41 U/L (ref 12–78)
ANION GAP SERPL CALC-SCNC: 8 MMOL/L (ref 5–15)
AST SERPL-CCNC: 54 U/L (ref 15–37)
BASOPHILS # BLD: 0 K/UL (ref 0–0.1)
BASOPHILS NFR BLD: 0 % (ref 0–1)
BILIRUB SERPL-MCNC: 0.5 MG/DL (ref 0.2–1)
BUN SERPL-MCNC: 17 MG/DL (ref 6–20)
BUN/CREAT SERPL: 20 (ref 12–20)
CALCIUM SERPL-MCNC: 9.4 MG/DL (ref 8.5–10.1)
CHLORIDE SERPL-SCNC: 100 MMOL/L (ref 97–108)
CO2 SERPL-SCNC: 24 MMOL/L (ref 21–32)
CREAT SERPL-MCNC: 0.86 MG/DL (ref 0.55–1.02)
DIFFERENTIAL METHOD BLD: ABNORMAL
EOSINOPHIL # BLD: 0.2 K/UL (ref 0–0.4)
EOSINOPHIL NFR BLD: 1 % (ref 0–7)
ERYTHROCYTE [DISTWIDTH] IN BLOOD BY AUTOMATED COUNT: 15.2 % (ref 11.5–14.5)
FLUAV AG NPH QL IA: NEGATIVE
FLUBV AG NOSE QL IA: NEGATIVE
GLOBULIN SER CALC-MCNC: 5.3 G/DL (ref 2–4)
GLUCOSE SERPL-MCNC: 104 MG/DL (ref 65–100)
HCT VFR BLD AUTO: 31 % (ref 35–47)
HGB BLD-MCNC: 10.2 G/DL (ref 11.5–16)
IMM GRANULOCYTES # BLD AUTO: 0.2 K/UL (ref 0–0.04)
IMM GRANULOCYTES NFR BLD AUTO: 1 % (ref 0–0.5)
LACTATE BLD-SCNC: 0.4 MMOL/L (ref 0.4–2)
LYMPHOCYTES # BLD: 2.8 K/UL (ref 0.8–3.5)
LYMPHOCYTES NFR BLD: 12 % (ref 12–49)
MCH RBC QN AUTO: 28 PG (ref 26–34)
MCHC RBC AUTO-ENTMCNC: 32.9 G/DL (ref 30–36.5)
MCV RBC AUTO: 85.2 FL (ref 80–99)
MONOCYTES # BLD: 2.5 K/UL (ref 0–1)
MONOCYTES NFR BLD: 11 % (ref 5–13)
NEUTS SEG # BLD: 17.4 K/UL (ref 1.8–8)
NEUTS SEG NFR BLD: 75 % (ref 32–75)
NRBC # BLD: 0 K/UL (ref 0–0.01)
NRBC BLD-RTO: 0 PER 100 WBC
NT PRO BNP: 67 PG/ML
PLATELET # BLD AUTO: 305 K/UL (ref 150–400)
PMV BLD AUTO: 11.4 FL (ref 8.9–12.9)
POTASSIUM SERPL-SCNC: 3.9 MMOL/L (ref 3.5–5.1)
PROT SERPL-MCNC: 8.1 G/DL (ref 6.4–8.2)
RBC # BLD AUTO: 3.64 M/UL (ref 3.8–5.2)
RBC MORPH BLD: ABNORMAL
SARS-COV-2 RDRP RESP QL NAA+PROBE: NOT DETECTED
SODIUM SERPL-SCNC: 132 MMOL/L (ref 136–145)
SOURCE: NORMAL
WBC # BLD AUTO: 23.1 K/UL (ref 3.6–11)

## 2024-03-30 PROCEDURE — 2580000003 HC RX 258: Performed by: INTERNAL MEDICINE

## 2024-03-30 PROCEDURE — 1100000003 HC PRIVATE W/ TELEMETRY

## 2024-03-30 PROCEDURE — 6370000000 HC RX 637 (ALT 250 FOR IP): Performed by: NURSE PRACTITIONER

## 2024-03-30 PROCEDURE — 6360000002 HC RX W HCPCS: Performed by: INTERNAL MEDICINE

## 2024-03-30 PROCEDURE — 6370000000 HC RX 637 (ALT 250 FOR IP): Performed by: INTERNAL MEDICINE

## 2024-03-30 PROCEDURE — 0202U NFCT DS 22 TRGT SARS-COV-2: CPT

## 2024-03-30 PROCEDURE — 87205 SMEAR GRAM STAIN: CPT

## 2024-03-30 PROCEDURE — 6360000004 HC RX CONTRAST MEDICATION: Performed by: STUDENT IN AN ORGANIZED HEALTH CARE EDUCATION/TRAINING PROGRAM

## 2024-03-30 PROCEDURE — 83880 ASSAY OF NATRIURETIC PEPTIDE: CPT

## 2024-03-30 PROCEDURE — 87635 SARS-COV-2 COVID-19 AMP PRB: CPT

## 2024-03-30 PROCEDURE — 36415 COLL VENOUS BLD VENIPUNCTURE: CPT

## 2024-03-30 PROCEDURE — 87040 BLOOD CULTURE FOR BACTERIA: CPT

## 2024-03-30 PROCEDURE — 85025 COMPLETE CBC W/AUTO DIFF WBC: CPT

## 2024-03-30 PROCEDURE — 71275 CT ANGIOGRAPHY CHEST: CPT

## 2024-03-30 PROCEDURE — 83605 ASSAY OF LACTIC ACID: CPT

## 2024-03-30 PROCEDURE — 80053 COMPREHEN METABOLIC PANEL: CPT

## 2024-03-30 PROCEDURE — 87070 CULTURE OTHR SPECIMN AEROBIC: CPT

## 2024-03-30 PROCEDURE — 2580000003 HC RX 258: Performed by: STUDENT IN AN ORGANIZED HEALTH CARE EDUCATION/TRAINING PROGRAM

## 2024-03-30 PROCEDURE — 99285 EMERGENCY DEPT VISIT HI MDM: CPT

## 2024-03-30 PROCEDURE — 87804 INFLUENZA ASSAY W/OPTIC: CPT

## 2024-03-30 PROCEDURE — 6360000002 HC RX W HCPCS: Performed by: STUDENT IN AN ORGANIZED HEALTH CARE EDUCATION/TRAINING PROGRAM

## 2024-03-30 RX ORDER — ACETAMINOPHEN 650 MG/1
650 SUPPOSITORY RECTAL EVERY 6 HOURS PRN
Status: DISCONTINUED | OUTPATIENT
Start: 2024-03-30 | End: 2024-04-02 | Stop reason: HOSPADM

## 2024-03-30 RX ORDER — SODIUM CHLORIDE 9 MG/ML
INJECTION, SOLUTION INTRAVENOUS PRN
Status: DISCONTINUED | OUTPATIENT
Start: 2024-03-30 | End: 2024-04-02 | Stop reason: HOSPADM

## 2024-03-30 RX ORDER — LOSARTAN POTASSIUM 50 MG/1
50 TABLET ORAL DAILY
COMMUNITY

## 2024-03-30 RX ORDER — ALBUTEROL SULFATE 2.5 MG/3ML
2.5 SOLUTION RESPIRATORY (INHALATION) EVERY 4 HOURS PRN
Status: DISCONTINUED | OUTPATIENT
Start: 2024-03-30 | End: 2024-04-02 | Stop reason: HOSPADM

## 2024-03-30 RX ORDER — ONDANSETRON 2 MG/ML
4 INJECTION INTRAMUSCULAR; INTRAVENOUS EVERY 6 HOURS PRN
Status: DISCONTINUED | OUTPATIENT
Start: 2024-03-30 | End: 2024-03-30

## 2024-03-30 RX ORDER — SODIUM CHLORIDE 0.9 % (FLUSH) 0.9 %
5-40 SYRINGE (ML) INJECTION PRN
Status: DISCONTINUED | OUTPATIENT
Start: 2024-03-30 | End: 2024-04-02 | Stop reason: HOSPADM

## 2024-03-30 RX ORDER — 0.9 % SODIUM CHLORIDE 0.9 %
1000 INTRAVENOUS SOLUTION INTRAVENOUS ONCE
Status: COMPLETED | OUTPATIENT
Start: 2024-03-30 | End: 2024-03-30

## 2024-03-30 RX ORDER — LANOLIN ALCOHOL/MO/W.PET/CERES
6 CREAM (GRAM) TOPICAL NIGHTLY PRN
Status: DISCONTINUED | OUTPATIENT
Start: 2024-03-30 | End: 2024-04-02 | Stop reason: HOSPADM

## 2024-03-30 RX ORDER — HYDRALAZINE HYDROCHLORIDE 20 MG/ML
20 INJECTION INTRAMUSCULAR; INTRAVENOUS EVERY 6 HOURS PRN
Status: DISCONTINUED | OUTPATIENT
Start: 2024-03-30 | End: 2024-04-02 | Stop reason: HOSPADM

## 2024-03-30 RX ORDER — HYDROCODONE POLISTIREX AND CHLORPHENIRAMINE POLISTIREX 10; 8 MG/5ML; MG/5ML
5 SUSPENSION, EXTENDED RELEASE ORAL EVERY 12 HOURS PRN
Status: DISCONTINUED | OUTPATIENT
Start: 2024-03-30 | End: 2024-04-02 | Stop reason: HOSPADM

## 2024-03-30 RX ORDER — ACETAMINOPHEN 325 MG/1
650 TABLET ORAL EVERY 4 HOURS PRN
Status: DISCONTINUED | OUTPATIENT
Start: 2024-03-30 | End: 2024-03-30

## 2024-03-30 RX ORDER — ACETAMINOPHEN 325 MG/1
650 TABLET ORAL EVERY 6 HOURS PRN
Status: DISCONTINUED | OUTPATIENT
Start: 2024-03-30 | End: 2024-04-02 | Stop reason: HOSPADM

## 2024-03-30 RX ORDER — POLYETHYLENE GLYCOL 3350 17 G/17G
17 POWDER, FOR SOLUTION ORAL DAILY
Status: DISCONTINUED | OUTPATIENT
Start: 2024-03-30 | End: 2024-04-02 | Stop reason: HOSPADM

## 2024-03-30 RX ORDER — ONDANSETRON 2 MG/ML
4 INJECTION INTRAMUSCULAR; INTRAVENOUS EVERY 6 HOURS PRN
Status: DISCONTINUED | OUTPATIENT
Start: 2024-03-30 | End: 2024-04-02 | Stop reason: HOSPADM

## 2024-03-30 RX ORDER — SODIUM CHLORIDE 0.9 % (FLUSH) 0.9 %
5-40 SYRINGE (ML) INJECTION EVERY 12 HOURS SCHEDULED
Status: DISCONTINUED | OUTPATIENT
Start: 2024-03-30 | End: 2024-04-02 | Stop reason: HOSPADM

## 2024-03-30 RX ORDER — GUAIFENESIN 600 MG/1
600 TABLET, EXTENDED RELEASE ORAL 2 TIMES DAILY PRN
Status: DISCONTINUED | OUTPATIENT
Start: 2024-03-30 | End: 2024-04-02 | Stop reason: HOSPADM

## 2024-03-30 RX ORDER — BISACODYL 10 MG
10 SUPPOSITORY, RECTAL RECTAL DAILY PRN
Status: DISCONTINUED | OUTPATIENT
Start: 2024-03-30 | End: 2024-04-02 | Stop reason: HOSPADM

## 2024-03-30 RX ORDER — ONDANSETRON 4 MG/1
4 TABLET, ORALLY DISINTEGRATING ORAL EVERY 8 HOURS PRN
Status: DISCONTINUED | OUTPATIENT
Start: 2024-03-30 | End: 2024-04-02 | Stop reason: HOSPADM

## 2024-03-30 RX ORDER — ENOXAPARIN SODIUM 100 MG/ML
40 INJECTION SUBCUTANEOUS DAILY
Status: DISCONTINUED | OUTPATIENT
Start: 2024-03-31 | End: 2024-04-02 | Stop reason: HOSPADM

## 2024-03-30 RX ADMIN — ACETAMINOPHEN 650 MG: 325 TABLET ORAL at 22:39

## 2024-03-30 RX ADMIN — IOPAMIDOL 100 ML: 755 INJECTION, SOLUTION INTRAVENOUS at 13:27

## 2024-03-30 RX ADMIN — ONDANSETRON 4 MG: 4 TABLET, ORALLY DISINTEGRATING ORAL at 19:42

## 2024-03-30 RX ADMIN — SODIUM CHLORIDE 1000 MG: 900 INJECTION INTRAVENOUS at 23:56

## 2024-03-30 RX ADMIN — SODIUM CHLORIDE, PRESERVATIVE FREE 10 ML: 5 INJECTION INTRAVENOUS at 22:48

## 2024-03-30 RX ADMIN — AZITHROMYCIN MONOHYDRATE 500 MG: 500 INJECTION, POWDER, LYOPHILIZED, FOR SOLUTION INTRAVENOUS at 22:47

## 2024-03-30 RX ADMIN — SODIUM CHLORIDE 1000 ML: 9 INJECTION, SOLUTION INTRAVENOUS at 13:04

## 2024-03-30 RX ADMIN — PIPERACILLIN SODIUM AND TAZOBACTAM SODIUM 4500 MG: 4; .5 INJECTION, POWDER, LYOPHILIZED, FOR SOLUTION INTRAVENOUS at 16:09

## 2024-03-30 RX ADMIN — GUAIFENESIN 600 MG: 600 TABLET, EXTENDED RELEASE ORAL at 19:42

## 2024-03-30 ASSESSMENT — PAIN DESCRIPTION - LOCATION: LOCATION: BACK

## 2024-03-30 ASSESSMENT — PAIN SCALES - GENERAL: PAINLEVEL_OUTOF10: 3

## 2024-03-30 NOTE — ED PROVIDER NOTES
range)   hydrALAZINE (APRESOLINE) injection 20 mg (has no administration in time range)   melatonin tablet 6 mg (has no administration in time range)   sodium chloride 0.9 % bolus 1,000 mL (0 mLs IntraVENous Stopped 3/30/24 1405)   iopamidol (ISOVUE-370) 76 % injection 100 mL (100 mLs IntraVENous Given 3/30/24 1327)   piperacillin-tazobactam (ZOSYN) 4,500 mg in sodium chloride 0.9 % 100 mL IVPB (mini-bag) (0 mg IntraVENous Stopped 3/30/24 1639)       CONSULTS: (Who and What was discussed)  IP CONSULT TO HOSPITALIST  IP CONSULT TO PULMONOLOGY      Chronic Conditions: htn, gerd    Social Determinants affecting Dx or Tx: None      Records Reviewed (source and summary of external notes): Nursing Notes and Old Medical Records    CC/HPI Summary, DDx, ED Course, and Reassessment: Patient presenting with chief complaint of cough, fatigue, decreased PO intake, weakness. Differential diagnosis includes pneumonia, abscess, mass. Consider acute bacterial pneumonia, influenza, asthma, transient airway hyperresponsiveness,  chronic causes of cough (including GERD, asthma, postnasal discharge, medication side effect, CHF, lung cancer or mass).  Taken off lisinopril by her primary care doctor, GERD.     ED Course as of 03/30/24 2319   Sat Mar 30, 2024   1503 CT with multifocal pneumonia, still hypertensive, with increased wbc and given she has already been on abx will admit for multifocal pneumonia  [NM]      ED Course User Index  [NM] Kyung Terry, DO       Disposition Considerations (Tests not done, Shared Decision Making, Pt Expectation of Test or Tx.):      FINAL IMPRESSION     1. Pneumonia of both lungs due to infectious organism, unspecified part of lung          DISPOSITION/PLAN   DISPOSITION Admitted 03/30/2024 06:25:51 PM      Admit Note: Pt is being admitted by Dr Ramos. The results of their tests and reason(s) for their admission have been discussed with pt and/or available family. They convey agreement and

## 2024-03-30 NOTE — H&P
chest x-rays    Did have a CT abdomen pelvis for N/V, diarrhea    Lung bases were read as clear  Occasional low-grade temperature 100.6  No night sweats  No significant weight loss  Non-smoker  No recent travel or unusual pets like birds  No recent construction around the house  Baseline does not have significant GERD or heartburn symptoms, no longer takes PPI  Cough not improved, very frequent, hurts all over especially in her back   Occasional nausea and dry heaves because of coughing spells  Clear SIRS or sepsis criteria as noted above  CTA chest in ED IMPRESSION:  1. No evidence of pulmonary embolism.   2. Bilateral multifocal patchy airspace disease and anterior left upper lobe  tree-in-bud opacities. Findings likely represent multifocal pneumonia.  Rapid COVID-19 and rapid influenza A/B-  Paired blood culture sent  IV Zosyn  We were asked to admit for work up and evaluation of the above problems.     Past Medical History:   Diagnosis Date    Arrhythmia     ?IRREGULAR HEARTBEAT    Arthritis     BACK    Colon polyps     Dr Marcelo    Diverticulitis     GERD (gastroesophageal reflux disease)     OCCASIONALLY    Hypertension     Ill-defined condition     stomach ulcer    Nausea & vomiting     Other ill-defined conditions(799.89)     DIVERTICULOSIS (HX: DIVERTICULITIS)    Other ill-defined conditions(799.89)     HI CHOLESTEROL    Psychiatric disorder     DEPRESSION        Past Surgical History:   Procedure Laterality Date    APPENDECTOMY      CATARACT REMOVAL      BILAT WITH IOL with bilateral lens implants    COLONOSCOPY N/A 11/13/2019    COLONOSCOPY performed by Demarco Urbina MD at \[facility]"" ENDOSCOPY    COLONOSCOPY FLX DX W/COLLJ SPEC WHEN PFRMD  6/25/2012         HYSTERECTOMY (CERVIX STATUS UNKNOWN)      KNEE ARTHROSCOPY      RIGHT KNEE     MOHS SURGERY      RIGHT SHOULDER--ROTATOR CUFF    ORTHOPEDIC SURGERY Right     shoulder - torn rotatory cuff and bone spur    ORTHOPEDIC SURGERY  6/2009    RIGHT TKR    OVARY

## 2024-03-31 LAB
ALBUMIN SERPL-MCNC: 2.5 G/DL (ref 3.5–5)
ALBUMIN/GLOB SERPL: 0.5 (ref 1.1–2.2)
ALP SERPL-CCNC: 73 U/L (ref 45–117)
ALT SERPL-CCNC: 48 U/L (ref 12–78)
ANION GAP SERPL CALC-SCNC: 8 MMOL/L (ref 5–15)
APPEARANCE UR: CLEAR
AST SERPL-CCNC: 66 U/L (ref 15–37)
B PERT DNA SPEC QL NAA+PROBE: NOT DETECTED
BACTERIA URNS QL MICRO: NEGATIVE /HPF
BASOPHILS # BLD: 0 K/UL (ref 0–0.1)
BASOPHILS NFR BLD: 0 % (ref 0–1)
BILIRUB SERPL-MCNC: 0.5 MG/DL (ref 0.2–1)
BILIRUB UR QL: NEGATIVE
BORDETELLA PARAPERTUSSIS BY PCR: NOT DETECTED
BUN SERPL-MCNC: 15 MG/DL (ref 6–20)
BUN/CREAT SERPL: 19 (ref 12–20)
C PNEUM DNA SPEC QL NAA+PROBE: NOT DETECTED
CALCIUM SERPL-MCNC: 9.1 MG/DL (ref 8.5–10.1)
CHLORIDE SERPL-SCNC: 105 MMOL/L (ref 97–108)
CO2 SERPL-SCNC: 23 MMOL/L (ref 21–32)
COLOR UR: ABNORMAL
CREAT SERPL-MCNC: 0.81 MG/DL (ref 0.55–1.02)
DIFFERENTIAL METHOD BLD: ABNORMAL
EOSINOPHIL # BLD: 0.2 K/UL (ref 0–0.4)
EOSINOPHIL NFR BLD: 1 % (ref 0–7)
EPITH CASTS URNS QL MICRO: ABNORMAL /LPF
ERYTHROCYTE [DISTWIDTH] IN BLOOD BY AUTOMATED COUNT: 15 % (ref 11.5–14.5)
FLUAV SUBTYP SPEC NAA+PROBE: NOT DETECTED
FLUBV RNA SPEC QL NAA+PROBE: NOT DETECTED
GLOBULIN SER CALC-MCNC: 4.8 G/DL (ref 2–4)
GLUCOSE SERPL-MCNC: 106 MG/DL (ref 65–100)
GLUCOSE UR STRIP.AUTO-MCNC: NEGATIVE MG/DL
HADV DNA SPEC QL NAA+PROBE: NOT DETECTED
HCOV 229E RNA SPEC QL NAA+PROBE: NOT DETECTED
HCOV HKU1 RNA SPEC QL NAA+PROBE: NOT DETECTED
HCOV NL63 RNA SPEC QL NAA+PROBE: NOT DETECTED
HCOV OC43 RNA SPEC QL NAA+PROBE: NOT DETECTED
HCT VFR BLD AUTO: 29.4 % (ref 35–47)
HGB BLD-MCNC: 9.7 G/DL (ref 11.5–16)
HGB UR QL STRIP: ABNORMAL
HMPV RNA SPEC QL NAA+PROBE: NOT DETECTED
HPIV1 RNA SPEC QL NAA+PROBE: NOT DETECTED
HPIV2 RNA SPEC QL NAA+PROBE: NOT DETECTED
HPIV3 RNA SPEC QL NAA+PROBE: NOT DETECTED
HPIV4 RNA SPEC QL NAA+PROBE: NOT DETECTED
HYALINE CASTS URNS QL MICRO: ABNORMAL /LPF (ref 0–2)
IMM GRANULOCYTES # BLD AUTO: 0.2 K/UL (ref 0–0.04)
IMM GRANULOCYTES NFR BLD AUTO: 1 % (ref 0–0.5)
KETONES UR QL STRIP.AUTO: NEGATIVE MG/DL
LEUKOCYTE ESTERASE UR QL STRIP.AUTO: ABNORMAL
LYMPHOCYTES # BLD: 2.4 K/UL (ref 0.8–3.5)
LYMPHOCYTES NFR BLD: 12 % (ref 12–49)
M PNEUMO DNA SPEC QL NAA+PROBE: NOT DETECTED
MCH RBC QN AUTO: 28.4 PG (ref 26–34)
MCHC RBC AUTO-ENTMCNC: 33 G/DL (ref 30–36.5)
MCV RBC AUTO: 86.2 FL (ref 80–99)
MONOCYTES # BLD: 2 K/UL (ref 0–1)
MONOCYTES NFR BLD: 10 % (ref 5–13)
NEUTS SEG # BLD: 15.3 K/UL (ref 1.8–8)
NEUTS SEG NFR BLD: 76 % (ref 32–75)
NITRITE UR QL STRIP.AUTO: NEGATIVE
NRBC # BLD: 0 K/UL (ref 0–0.01)
NRBC BLD-RTO: 0 PER 100 WBC
PH UR STRIP: 5.5 (ref 5–8)
PLATELET # BLD AUTO: 288 K/UL (ref 150–400)
PMV BLD AUTO: 11.2 FL (ref 8.9–12.9)
POTASSIUM SERPL-SCNC: 3.7 MMOL/L (ref 3.5–5.1)
PROCALCITONIN SERPL-MCNC: 0.23 NG/ML
PROT SERPL-MCNC: 7.3 G/DL (ref 6.4–8.2)
PROT UR STRIP-MCNC: 30 MG/DL
RBC # BLD AUTO: 3.41 M/UL (ref 3.8–5.2)
RBC #/AREA URNS HPF: ABNORMAL /HPF (ref 0–5)
RBC MORPH BLD: ABNORMAL
RSV RNA SPEC QL NAA+PROBE: NOT DETECTED
RV+EV RNA SPEC QL NAA+PROBE: NOT DETECTED
SARS-COV-2 RNA RESP QL NAA+PROBE: NOT DETECTED
SODIUM SERPL-SCNC: 136 MMOL/L (ref 136–145)
SP GR UR REFRACTOMETRY: 1.02
URINE CULTURE IF INDICATED: ABNORMAL
UROBILINOGEN UR QL STRIP.AUTO: 0.2 EU/DL (ref 0.2–1)
WBC # BLD AUTO: 20.1 K/UL (ref 3.6–11)
WBC MORPH BLD: ABNORMAL
WBC URNS QL MICRO: ABNORMAL /HPF (ref 0–4)

## 2024-03-31 PROCEDURE — 1100000003 HC PRIVATE W/ TELEMETRY

## 2024-03-31 PROCEDURE — 6370000000 HC RX 637 (ALT 250 FOR IP): Performed by: STUDENT IN AN ORGANIZED HEALTH CARE EDUCATION/TRAINING PROGRAM

## 2024-03-31 PROCEDURE — 36415 COLL VENOUS BLD VENIPUNCTURE: CPT

## 2024-03-31 PROCEDURE — 94761 N-INVAS EAR/PLS OXIMETRY MLT: CPT

## 2024-03-31 PROCEDURE — 84145 PROCALCITONIN (PCT): CPT

## 2024-03-31 PROCEDURE — 87086 URINE CULTURE/COLONY COUNT: CPT

## 2024-03-31 PROCEDURE — 2580000003 HC RX 258: Performed by: INTERNAL MEDICINE

## 2024-03-31 PROCEDURE — 85025 COMPLETE CBC W/AUTO DIFF WBC: CPT

## 2024-03-31 PROCEDURE — 81001 URINALYSIS AUTO W/SCOPE: CPT

## 2024-03-31 PROCEDURE — 6360000002 HC RX W HCPCS: Performed by: INTERNAL MEDICINE

## 2024-03-31 PROCEDURE — 87205 SMEAR GRAM STAIN: CPT

## 2024-03-31 PROCEDURE — 80053 COMPREHEN METABOLIC PANEL: CPT

## 2024-03-31 PROCEDURE — 6370000000 HC RX 637 (ALT 250 FOR IP): Performed by: INTERNAL MEDICINE

## 2024-03-31 PROCEDURE — 87070 CULTURE OTHR SPECIMN AEROBIC: CPT

## 2024-03-31 RX ORDER — IPRATROPIUM BROMIDE AND ALBUTEROL SULFATE 2.5; .5 MG/3ML; MG/3ML
1 SOLUTION RESPIRATORY (INHALATION)
Status: DISCONTINUED | OUTPATIENT
Start: 2024-03-31 | End: 2024-04-01

## 2024-03-31 RX ORDER — BENZONATATE 100 MG/1
100 CAPSULE ORAL 3 TIMES DAILY PRN
Status: DISCONTINUED | OUTPATIENT
Start: 2024-03-31 | End: 2024-04-02 | Stop reason: HOSPADM

## 2024-03-31 RX ADMIN — BENZONATATE 100 MG: 100 CAPSULE ORAL at 10:34

## 2024-03-31 RX ADMIN — AZITHROMYCIN MONOHYDRATE 500 MG: 500 INJECTION, POWDER, LYOPHILIZED, FOR SOLUTION INTRAVENOUS at 21:58

## 2024-03-31 RX ADMIN — SODIUM CHLORIDE, PRESERVATIVE FREE 10 ML: 5 INJECTION INTRAVENOUS at 20:50

## 2024-03-31 RX ADMIN — SODIUM CHLORIDE, PRESERVATIVE FREE 10 ML: 5 INJECTION INTRAVENOUS at 08:34

## 2024-03-31 RX ADMIN — HYDROCODONE POLISTIREX AND CHLORPHENIRAMINE POLISTIREX 5 ML: 10; 8 SUSPENSION, EXTENDED RELEASE ORAL at 09:04

## 2024-03-31 RX ADMIN — HYDROCODONE POLISTIREX AND CHLORPHENIRAMINE POLISTIREX 5 ML: 10; 8 SUSPENSION, EXTENDED RELEASE ORAL at 20:57

## 2024-03-31 RX ADMIN — POLYETHYLENE GLYCOL 3350 17 G: 17 POWDER, FOR SOLUTION ORAL at 08:33

## 2024-03-31 RX ADMIN — ENOXAPARIN SODIUM 40 MG: 100 INJECTION SUBCUTANEOUS at 08:33

## 2024-03-31 RX ADMIN — SODIUM CHLORIDE 1000 MG: 900 INJECTION INTRAVENOUS at 21:57

## 2024-03-31 NOTE — CONSULTS
Range    Special Requests NO SPECIAL REQUESTS  LEFT  Antecubital        Culture NO GROWTH AFTER 17 HOURS     Blood Culture 1    Collection Time: 03/30/24  3:37 PM    Specimen: Blood   Result Value Ref Range    Special Requests NO SPECIAL REQUESTS  RIGHT  FOREARM        Culture NO GROWTH AFTER 17 HOURS     POC Lactic Acid    Collection Time: 03/30/24  3:54 PM   Result Value Ref Range    POC Lactic Acid 0.40 0.40 - 2.00 mmol/L   Respiratory Panel, Molecular, with COVID-19 (Restricted: peds pts or suitable admitted adults)    Collection Time: 03/30/24  7:45 PM    Specimen: Nasopharyngeal   Result Value Ref Range    Adenovirus by PCR Not detected NOTD      Coronavirus 229E by PCR Not detected NOTD      Coronavirus HKU1 by PCR Not detected NOTD      Coronavirus NL63 by PCR Not detected NOTD      Coronavirus OC43 by PCR Not detected NOTD      SARS-CoV-2, PCR Not detected NOTD      Human Metapneumovirus by PCR Not detected NOTD      Rhinovirus Enterovirus PCR Not detected NOTD      Influenza A by PCR Not detected NOTD      Influenza B PCR Not detected NOTD      Parainfluenza 1 PCR Not detected NOTD      Parainfluenza 2 PCR Not detected NOTD      Parainfluenza 3 PCR Not detected NOTD      Parainfluenza 4 PCR Not detected NOTD      Respiratory Syncytial Virus by PCR Not detected NOTD      Bordetella parapertussis by PCR Not detected NOTD      Bordetella pertussis by PCR Not detected NOTD      Chlamydophila Pneumonia PCR Not detected NOTD      Mycoplasma pneumo by PCR Not detected NOTD     Procalcitonin    Collection Time: 03/31/24  2:42 AM   Result Value Ref Range    Procalcitonin 0.23 ng/mL   Comprehensive Metabolic Panel w/ Reflex to MG    Collection Time: 03/31/24  2:42 AM   Result Value Ref Range    Sodium 136 136 - 145 mmol/L    Potassium 3.7 3.5 - 5.1 mmol/L    Chloride 105 97 - 108 mmol/L    CO2 23 21 - 32 mmol/L    Anion Gap 8 5 - 15 mmol/L    Glucose 106 (H) 65 - 100 mg/dL    BUN 15 6 - 20 MG/DL    Creatinine 0.81

## 2024-04-01 LAB
ANION GAP SERPL CALC-SCNC: 9 MMOL/L (ref 5–15)
BACTERIA SPEC CULT: NORMAL
BASOPHILS # BLD: 0.1 K/UL (ref 0–0.1)
BASOPHILS NFR BLD: 0 % (ref 0–1)
BUN SERPL-MCNC: 13 MG/DL (ref 6–20)
BUN/CREAT SERPL: 19 (ref 12–20)
CALCIUM SERPL-MCNC: 9.3 MG/DL (ref 8.5–10.1)
CHLORIDE SERPL-SCNC: 102 MMOL/L (ref 97–108)
CO2 SERPL-SCNC: 23 MMOL/L (ref 21–32)
CREAT SERPL-MCNC: 0.68 MG/DL (ref 0.55–1.02)
DIFFERENTIAL METHOD BLD: ABNORMAL
EOSINOPHIL # BLD: 0.4 K/UL (ref 0–0.4)
EOSINOPHIL NFR BLD: 2 % (ref 0–7)
ERYTHROCYTE [DISTWIDTH] IN BLOOD BY AUTOMATED COUNT: 15.2 % (ref 11.5–14.5)
GLUCOSE SERPL-MCNC: 97 MG/DL (ref 65–100)
HCT VFR BLD AUTO: 29.5 % (ref 35–47)
HGB BLD-MCNC: 9.6 G/DL (ref 11.5–16)
IMM GRANULOCYTES # BLD AUTO: 0.3 K/UL (ref 0–0.04)
IMM GRANULOCYTES NFR BLD AUTO: 2 % (ref 0–0.5)
LYMPHOCYTES # BLD: 3.1 K/UL (ref 0.8–3.5)
LYMPHOCYTES NFR BLD: 15 % (ref 12–49)
MCH RBC QN AUTO: 27.7 PG (ref 26–34)
MCHC RBC AUTO-ENTMCNC: 32.5 G/DL (ref 30–36.5)
MCV RBC AUTO: 85 FL (ref 80–99)
MONOCYTES # BLD: 1.9 K/UL (ref 0–1)
MONOCYTES NFR BLD: 9 % (ref 5–13)
NEUTS SEG # BLD: 15.5 K/UL (ref 1.8–8)
NEUTS SEG NFR BLD: 72 % (ref 32–75)
NRBC # BLD: 0 K/UL (ref 0–0.01)
NRBC BLD-RTO: 0 PER 100 WBC
PLATELET # BLD AUTO: 330 K/UL (ref 150–400)
PMV BLD AUTO: 11.3 FL (ref 8.9–12.9)
POTASSIUM SERPL-SCNC: 3.7 MMOL/L (ref 3.5–5.1)
RBC # BLD AUTO: 3.47 M/UL (ref 3.8–5.2)
SERVICE CMNT-IMP: NORMAL
SODIUM SERPL-SCNC: 134 MMOL/L (ref 136–145)
WBC # BLD AUTO: 21.3 K/UL (ref 3.6–11)

## 2024-04-01 PROCEDURE — 6360000002 HC RX W HCPCS: Performed by: INTERNAL MEDICINE

## 2024-04-01 PROCEDURE — 97165 OT EVAL LOW COMPLEX 30 MIN: CPT

## 2024-04-01 PROCEDURE — 94640 AIRWAY INHALATION TREATMENT: CPT

## 2024-04-01 PROCEDURE — 2580000003 HC RX 258: Performed by: INTERNAL MEDICINE

## 2024-04-01 PROCEDURE — 6370000000 HC RX 637 (ALT 250 FOR IP): Performed by: PHYSICIAN ASSISTANT

## 2024-04-01 PROCEDURE — 97535 SELF CARE MNGMENT TRAINING: CPT

## 2024-04-01 PROCEDURE — 85025 COMPLETE CBC W/AUTO DIFF WBC: CPT

## 2024-04-01 PROCEDURE — 80048 BASIC METABOLIC PNL TOTAL CA: CPT

## 2024-04-01 PROCEDURE — 6370000000 HC RX 637 (ALT 250 FOR IP): Performed by: STUDENT IN AN ORGANIZED HEALTH CARE EDUCATION/TRAINING PROGRAM

## 2024-04-01 PROCEDURE — 97162 PT EVAL MOD COMPLEX 30 MIN: CPT

## 2024-04-01 PROCEDURE — 2500000003 HC RX 250 WO HCPCS: Performed by: STUDENT IN AN ORGANIZED HEALTH CARE EDUCATION/TRAINING PROGRAM

## 2024-04-01 PROCEDURE — 36415 COLL VENOUS BLD VENIPUNCTURE: CPT

## 2024-04-01 PROCEDURE — 97116 GAIT TRAINING THERAPY: CPT

## 2024-04-01 PROCEDURE — 6370000000 HC RX 637 (ALT 250 FOR IP): Performed by: INTERNAL MEDICINE

## 2024-04-01 PROCEDURE — 94761 N-INVAS EAR/PLS OXIMETRY MLT: CPT

## 2024-04-01 PROCEDURE — 1100000003 HC PRIVATE W/ TELEMETRY

## 2024-04-01 RX ORDER — IPRATROPIUM BROMIDE AND ALBUTEROL SULFATE 2.5; .5 MG/3ML; MG/3ML
1 SOLUTION RESPIRATORY (INHALATION)
Status: DISCONTINUED | OUTPATIENT
Start: 2024-04-01 | End: 2024-04-02 | Stop reason: HOSPADM

## 2024-04-01 RX ORDER — METOPROLOL TARTRATE 1 MG/ML
2.5 INJECTION, SOLUTION INTRAVENOUS ONCE
Status: COMPLETED | OUTPATIENT
Start: 2024-04-01 | End: 2024-04-01

## 2024-04-01 RX ORDER — PREDNISONE 20 MG/1
40 TABLET ORAL DAILY
Status: DISCONTINUED | OUTPATIENT
Start: 2024-04-01 | End: 2024-04-02 | Stop reason: HOSPADM

## 2024-04-01 RX ADMIN — IPRATROPIUM BROMIDE AND ALBUTEROL SULFATE 1 DOSE: .5; 3 SOLUTION RESPIRATORY (INHALATION) at 08:40

## 2024-04-01 RX ADMIN — ACETAMINOPHEN 650 MG: 325 TABLET ORAL at 09:44

## 2024-04-01 RX ADMIN — BENZONATATE 100 MG: 100 CAPSULE ORAL at 17:42

## 2024-04-01 RX ADMIN — SODIUM CHLORIDE, PRESERVATIVE FREE 10 ML: 5 INJECTION INTRAVENOUS at 22:00

## 2024-04-01 RX ADMIN — IPRATROPIUM BROMIDE AND ALBUTEROL SULFATE 1 DOSE: .5; 3 SOLUTION RESPIRATORY (INHALATION) at 20:19

## 2024-04-01 RX ADMIN — SODIUM CHLORIDE 1000 MG: 900 INJECTION INTRAVENOUS at 22:41

## 2024-04-01 RX ADMIN — IPRATROPIUM BROMIDE AND ALBUTEROL SULFATE 1 DOSE: .5; 3 SOLUTION RESPIRATORY (INHALATION) at 11:38

## 2024-04-01 RX ADMIN — ENOXAPARIN SODIUM 40 MG: 100 INJECTION SUBCUTANEOUS at 09:41

## 2024-04-01 RX ADMIN — AZITHROMYCIN MONOHYDRATE 500 MG: 500 INJECTION, POWDER, LYOPHILIZED, FOR SOLUTION INTRAVENOUS at 23:52

## 2024-04-01 RX ADMIN — BENZONATATE 100 MG: 100 CAPSULE ORAL at 22:44

## 2024-04-01 RX ADMIN — PREDNISONE 40 MG: 20 TABLET ORAL at 13:11

## 2024-04-01 RX ADMIN — BENZONATATE 100 MG: 100 CAPSULE ORAL at 09:57

## 2024-04-01 RX ADMIN — POLYETHYLENE GLYCOL 3350 17 G: 17 POWDER, FOR SOLUTION ORAL at 09:41

## 2024-04-01 RX ADMIN — SODIUM CHLORIDE, PRESERVATIVE FREE 10 ML: 5 INJECTION INTRAVENOUS at 09:41

## 2024-04-01 RX ADMIN — METOPROLOL TARTRATE 2.5 MG: 5 INJECTION INTRAVENOUS at 09:57

## 2024-04-01 ASSESSMENT — PAIN DESCRIPTION - PAIN TYPE
TYPE: ACUTE PAIN

## 2024-04-01 ASSESSMENT — PAIN SCALES - GENERAL
PAINLEVEL_OUTOF10: 3
PAINLEVEL_OUTOF10: 0
PAINLEVEL_OUTOF10: 0
PAINLEVEL_OUTOF10: 4
PAINLEVEL_OUTOF10: 5

## 2024-04-01 ASSESSMENT — PAIN DESCRIPTION - FREQUENCY
FREQUENCY: INTERMITTENT

## 2024-04-01 ASSESSMENT — PAIN DESCRIPTION - ONSET
ONSET: GRADUAL

## 2024-04-01 ASSESSMENT — PAIN DESCRIPTION - ORIENTATION
ORIENTATION: RIGHT;LEFT

## 2024-04-01 ASSESSMENT — PAIN - FUNCTIONAL ASSESSMENT
PAIN_FUNCTIONAL_ASSESSMENT: ACTIVITIES ARE NOT PREVENTED

## 2024-04-01 ASSESSMENT — PAIN DESCRIPTION - LOCATION
LOCATION: NECK

## 2024-04-01 ASSESSMENT — PAIN DESCRIPTION - DESCRIPTORS
DESCRIPTORS: ACHING

## 2024-04-01 NOTE — CARE COORDINATION
Care Management Initial Assessment       RUR: 11%  Readmission? No  1st IM letter given? Yes -   1st  letter given: No     CM unable to reach pt by phone.  CM completed assessment w/ via phone.  No history of HH & does not have a preference in HH care providers at d/c.  No DME use.  Patient is independent with ADL to include driving.  Patient has a strong support system to include, , son, daughter, grandchildren & many other family members.  Patient uses [facility].  Family will transport at d/c       04/01/24 7227   Service Assessment   Patient Orientation Unable to Assess  (unable to reach by phone)   Cognition Other (see comment)  (unable to reach by phone)   History Provided By Spouse   Primary Caregiver Self   Support Systems Spouse/Significant Other;Family Members  (son, daughter, grandchildren & many other family members)   PCP Verified by CM Yes   Last Visit to PCP Within last 6 months   Prior Functional Level Independent in ADLs/IADLs   Current Functional Level Independent in ADLs/IADLs   Can patient return to prior living arrangement Yes   Family able to assist with home care needs: Yes   Would you like for me to discuss the discharge plan with any other family members/significant others, and if so, who? No   Financial Resources Medicare   Community Resources None   Social/Functional History   Lives With Spouse   Type of Home House  (one story home w/2 ADAL)   Home Equipment None   Active  Yes     Evelia Covington  Ext [phone]

## 2024-04-02 VITALS
BODY MASS INDEX: 23.63 KG/M2 | HEART RATE: 96 BPM | OXYGEN SATURATION: 96 % | HEIGHT: 67 IN | DIASTOLIC BLOOD PRESSURE: 81 MMHG | TEMPERATURE: 97.5 F | RESPIRATION RATE: 18 BRPM | SYSTOLIC BLOOD PRESSURE: 158 MMHG | WEIGHT: 150.57 LBS

## 2024-04-02 LAB
BACTERIA SPEC CULT: NORMAL
GRAM STN SPEC: NORMAL
SERVICE CMNT-IMP: NORMAL

## 2024-04-02 PROCEDURE — 6370000000 HC RX 637 (ALT 250 FOR IP): Performed by: INTERNAL MEDICINE

## 2024-04-02 PROCEDURE — 2580000003 HC RX 258: Performed by: INTERNAL MEDICINE

## 2024-04-02 PROCEDURE — 6370000000 HC RX 637 (ALT 250 FOR IP): Performed by: PHYSICIAN ASSISTANT

## 2024-04-02 PROCEDURE — 6370000000 HC RX 637 (ALT 250 FOR IP): Performed by: NURSE PRACTITIONER

## 2024-04-02 PROCEDURE — 92610 EVALUATE SWALLOWING FUNCTION: CPT

## 2024-04-02 PROCEDURE — 94640 AIRWAY INHALATION TREATMENT: CPT

## 2024-04-02 PROCEDURE — 6370000000 HC RX 637 (ALT 250 FOR IP): Performed by: STUDENT IN AN ORGANIZED HEALTH CARE EDUCATION/TRAINING PROGRAM

## 2024-04-02 PROCEDURE — 94761 N-INVAS EAR/PLS OXIMETRY MLT: CPT

## 2024-04-02 PROCEDURE — 6360000002 HC RX W HCPCS: Performed by: INTERNAL MEDICINE

## 2024-04-02 RX ORDER — AMOXICILLIN AND CLAVULANATE POTASSIUM 875; 125 MG/1; MG/1
1 TABLET, FILM COATED ORAL 2 TIMES DAILY
Qty: 6 TABLET | Refills: 0 | Status: SHIPPED | OUTPATIENT
Start: 2024-04-02 | End: 2024-04-05

## 2024-04-02 RX ORDER — AZITHROMYCIN 500 MG/1
250 TABLET, FILM COATED ORAL DAILY
Qty: 2 TABLET | Refills: 0 | Status: SHIPPED | OUTPATIENT
Start: 2024-04-02 | End: 2024-04-05

## 2024-04-02 RX ORDER — GUAIFENESIN 600 MG/1
600 TABLET, EXTENDED RELEASE ORAL 2 TIMES DAILY PRN
Qty: 14 TABLET | Refills: 0 | Status: SHIPPED | OUTPATIENT
Start: 2024-04-02 | End: 2024-04-09

## 2024-04-02 RX ORDER — FAMOTIDINE 20 MG/1
20 TABLET, FILM COATED ORAL 2 TIMES DAILY
Qty: 60 TABLET | Refills: 0 | Status: SHIPPED | OUTPATIENT
Start: 2024-04-02

## 2024-04-02 RX ORDER — BENZONATATE 100 MG/1
100 CAPSULE ORAL 3 TIMES DAILY PRN
Qty: 20 CAPSULE | Refills: 0 | Status: SHIPPED | OUTPATIENT
Start: 2024-04-02 | End: 2024-04-09

## 2024-04-02 RX ORDER — PANTOPRAZOLE SODIUM 40 MG/1
40 TABLET, DELAYED RELEASE ORAL
Qty: 60 TABLET | Refills: 0 | Status: SHIPPED | OUTPATIENT
Start: 2024-04-02

## 2024-04-02 RX ORDER — PREDNISONE 20 MG/1
40 TABLET ORAL DAILY
Qty: 10 TABLET | Refills: 0 | Status: SHIPPED | OUTPATIENT
Start: 2024-04-03 | End: 2024-04-08

## 2024-04-02 RX ADMIN — SODIUM CHLORIDE, PRESERVATIVE FREE 10 ML: 5 INJECTION INTRAVENOUS at 08:26

## 2024-04-02 RX ADMIN — ENOXAPARIN SODIUM 40 MG: 100 INJECTION SUBCUTANEOUS at 08:23

## 2024-04-02 RX ADMIN — GUAIFENESIN 600 MG: 600 TABLET, EXTENDED RELEASE ORAL at 04:32

## 2024-04-02 RX ADMIN — PREDNISONE 40 MG: 20 TABLET ORAL at 08:23

## 2024-04-02 RX ADMIN — IPRATROPIUM BROMIDE AND ALBUTEROL SULFATE 1 DOSE: .5; 3 SOLUTION RESPIRATORY (INHALATION) at 08:58

## 2024-04-02 RX ADMIN — POLYETHYLENE GLYCOL 3350 17 G: 17 POWDER, FOR SOLUTION ORAL at 08:25

## 2024-04-02 NOTE — DISCHARGE INSTRUCTIONS
All of your medications from before your hospitalization are the same EXCEPT:  Your new prescription for you to start is prednisone for your lungs.  You can take Tessalon as needed for cough.  You can take Mucinex to thin your mucus.  You should take Protonix and pepcid twice a day for the next 30 days-this will help block acid. The azithromycin and Augmentin are to treat your pneumonia.  Please take all of your medications as prescribed. If prescribed any medications, please read all pharmacy instructions and inserts. Inform your doctor and pharmacist about all other medications and alternative therapies.  Please follow up with your PCP in 1-2 weeks to be reassessed after your hospital stay.  Please utilize the GERD diet handout.  Please also follow up with pulmonology (lung doctor) and gastroenterology (stomach doctor).  If you start feeling any symptoms similar to what brought you into the hospital, please come back to the ED to be re-evaluated.    
none

## 2024-04-02 NOTE — DISCHARGE SUMMARY
[address]  [address]  [facility] [address] [phone]    Follow up in 1 week(s)      Hayden Lopez MD  [address] [facility] [address] [phone]    Schedule an appointment as soon as possible for a visit  Hospital follow up for GERD and cough. You were treated for pneumonia but you also had a work up for chronic cough started. You may need a referral to GI.    Demarco Urbina MD  [address] [facility] [address] [phone]    Schedule an appointment as soon as possible for a visit in 1 month(s)  Call and schedule an appointment with gastroenterology to see if they need to work you up for GERD. You may need a referral from your PCP.          Total time in minutes spent coordinating this discharge (includes going over instructions, follow-up, prescriptions, and preparing report for sign off to her PCP) :  35 minutes

## 2024-04-02 NOTE — PROGRESS NOTES
Hospitalist Progress Note    NAME:   Bandar Montano   : 1940   MRN: [id number]     Date/Time: 3/31/2024 2:10 PM  Patient PCP: Hayden Lopez MD    Estimated discharge date: 1-2 days  Barriers: pulm recs, clinical improvement      Assessment / Plan:    Bilateral pneumonia POA  Sepsis POA WBC 23.1, , RR 20-26, normal lactate  Intractable cough POA  Mild bilateral conjunctivitis  Onset in January of increasing cough productive of scant sputum              Followed up several times with PCP              Courses of steroids and cough medicines no benefit              Taken off lisinopril last week              Told ER she took courses of antibiotics patient and son denied this to me                          They told me she has not received any antibiotics              Denies recent chest x-rays                          Did have a CT abdomen pelvis for N/V, diarrhea                          Lung bases were read as clear  POA:  Occasional low-grade temperature 100.6   No night sweats  No significant weight loss  Non-smoker  No recent travel or unusual pets like birds  No recent construction around the house  Baseline does not have significant GERD or heartburn symptoms, no longer takes PPI  Cough not improved, very frequent, hurts all over especially in her back              Occasional nausea and dry heaves because of coughing spells  ED course:  Clear SIRS or sepsis criteria as noted above  CTA chest in ED IMPRESSION:  1. No evidence of pulmonary embolism.   2. Bilateral multifocal patchy airspace disease and anterior left upper lobe  tree-in-bud opacities. Findings likely represent multifocal pneumonia.  Rapid COVID-19 and rapid influenza A/B-negative  Paired blood culture sent-NGTD  Differential broad-  Most likely as a community-acquired bacterial or viral infection              Been going on for over 2 months but 1 months ago the bases were read as clear on CT scan abdomen              ?  
      Hospitalist Progress Note    NAME:   Bandar Montano   : 1940   MRN: [id number]     Date/Time: 2024 4:53 PM  Patient PCP: Hayden Lopez MD    Estimated discharge date: 1-2 days  Barriers: pulm recs, clinical improvement      Assessment / Plan:    Bilateral pneumonia POA  Sepsis POA WBC 23.1, , RR 20-26, normal lactate  Intractable cough POA  Mild bilateral conjunctivitis  Onset in January of increasing cough productive of scant sputum              Followed up several times with PCP              Courses of steroids and cough medicines no benefit              Taken off lisinopril last week              Told ER she took courses of antibiotics patient and son denied this to me                          They told me she has not received any antibiotics              Denies recent chest x-rays                          Did have a CT abdomen pelvis for N/V, diarrhea                          Lung bases were read as clear  POA:  Occasional low-grade temperature 100.6   No night sweats  No significant weight loss  Non-smoker  No recent travel or unusual pets like birds  No recent construction around the house  Baseline does not have significant GERD or heartburn symptoms, no longer takes PPI  Cough not improved, very frequent, hurts all over especially in her back              Occasional nausea and dry heaves because of coughing spells  ED course:  Clear SIRS or sepsis criteria as noted above  CTA chest in ED IMPRESSION:  1. No evidence of pulmonary embolism.   2. Bilateral multifocal patchy airspace disease and anterior left upper lobe  tree-in-bud opacities. Findings likely represent multifocal pneumonia.  Rapid COVID-19 and rapid influenza A/B-negative  Paired blood culture sent-NGTD  Differential broad-  Most likely as a community-acquired bacterial or viral infection              Been going on for over 2 months but 1 months ago the bases were read as clear on CT scan abdomen              ?  
ADULT PROTOCOL: JET AEROSOL ASSESSMENT    Patient  Bandar Montano     83 y.o.   female     4/1/2024  3:41 PM    Breath Sounds Pre Procedure: Breath Sounds Pre-Tx [place]: Coarse crackles                                    Breath Sounds Post Procedure:                      Heart Rate: Pre procedure Pre-Tx Pulse: 91           Post procedure      Resp Rate: Pre procedure Pre-Tx Resps: 18           Post procedure      Oxygen:      Room air     Changed: No    SpO2:  SpO2: 93 %   without Oxygen                Nebulizer Therapy: Current medications Medications: Albuterol/Ipratropium      Changed: No        Problem List:   Patient Active Problem List   Diagnosis    Osteoarthrosis, localized, primary, knee, left    Septic arthritis (HCC)    Rectocele    Multifocal pneumonia    Community acquired pneumonia, bilateral       Respiratory Therapist: Alexus Stokes RT      
ADULT PROTOCOL: JET AEROSOL ASSESSMENT    Patient  Bandar Montano     83 y.o.   female     4/2/2024  10:57 AM    Breath Sounds Pre Procedure: Breath Sounds Pre-Tx [place]: Diminished                                  Breath Sounds Pre-Tx LLL: Diminished        Breath Sounds Pre-Tx RUL: Diminished        Breath Sounds Pre-Tx RML: Diminished        Breath Sounds Pre-Tx RLL: Diminished  Breath Sounds Post Procedure: Breath Sounds Post-Tx [place]: Diminished          Breath Sounds Post-Tx LLL: Diminished          Breath Sounds Post-Tx RUL: Diminished          Breath Sounds Post-Tx RML: Diminished          Breath Sounds Post-Tx RLL: Diminished         Heart Rate: Pre procedure Pre-Tx Pulse: 90           Post procedure Post-Tx Pulse: 90    Resp Rate: Pre procedure Pre-Tx Resps: 20           Post procedure Post-Tx Resps: 20    Oxygen:     nasal cannula     Changed: No    SpO2:  SpO2: 96 %   with Oxygen                Nebulizer Therapy: Current medications Medications: Albuterol/Ipratropium      Changed: No    Smoking History: never    Problem List:   Patient Active Problem List   Diagnosis    Osteoarthrosis, localized, primary, knee, left    Septic arthritis (HCC)    Rectocele    Multifocal pneumonia    Community acquired pneumonia, bilateral       Respiratory Therapist: Tao Alvarez, RT    
Attempted to schedule PCP hospital follow up appointment. Unable to reach anyone, left voicemail.  Pending patient discharge. Denisha Viera, Care Management Assistant  
DISCHARGE NOTE FROM SURGICAL-TELEMETRY NURSE    Patient determined to be stable for discharge by attending provider. I have reviewed the discharge instructions and follow-up appointments with the Patient, Spouse, and Family Member daughter . They verbalized understanding and all questions were answered to their satisfaction. No complaints or further questions were expressed.      Medications sent to pharmacy Appropriate educational materials and medication side effect teaching were provided.      PIV were removed prior to discharge.     Patient did not discharge with any line, flores, or drain.    All personal items collected during admission were returned to the patient prior to discharge.    Post-op patient: Rani Hidalgo RN        
End of Shift Note    Bedside shift change report given to BE Buck (oncoming nurse) by Deemtrio Friedman RN (offgoing nurse).  Report included the following information SBAR    Shift worked:  1182-8381     Shift summary and any significant changes:    No complaints of pain  Tussionex given 1x  Uneventful shift   Concerns for physician to address:      Zone phone for oncoming shift:               Demetrio Friedman, RN                            
End of Shift Note    Bedside shift change report given to BE Pal (oncoming nurse) by Demetrio Friedman RN (offgoing nurse).  Report included the following information SBAR    Shift worked:  2026-0700     Shift summary and any significant changes:    Admitted pt with pneumonia  Decreased O2 at 1lpn with 96%  3/10 back pain d/t coughing  Submitted urine culture/specimen  Withdrew labs this morning  Uneventful shift   Concerns for physician to address:      Zone phone for oncoming shift:             Demetrio Friedman RN                            
End of Shift Note    Bedside shift change report given to Chito  (oncoming nurse) by Demarco Zapata RN (offgoing nurse).  Report included the following information SBAR, Kardex, Intake/Output, and MAR    Shift worked:  7/7 pm      Shift summary and any significant changes:     Patient is alert oriented times 4, room air tolerated. Patient seen by pulmonology and agree with iv antibiotics and anti tussive prn. Resent putum culture. Needsattended.     Demarco Zapata RN                            
End of Shift Note    Bedside shift change report given to Edith (oncoming nurse) by Colleen March RN (offgoing nurse).  Report included the following information SBAR, Kardex, and MAR    Shift worked:  7p-7a     Shift summary and any significant changes:     Scheduled medications were given, see MAR.  Patient was given Tessalon and Mucinex once each respectively, see PRN MAR.  IV's have been flushed and are patent.  Patient is up with the assistance of one.  Patient teaching and routine rounding has been done.     Concerns for physician to address:       Zone phone for oncoming shift:          Activity:     Number times ambulated in hallways past shift: 0  Number of times OOB to chair past shift: 0    Cardiac:   Cardiac Monitoring: No           Access:  Current line(s): PIV     Genitourinary:   Urinary status: voiding    Respiratory:      Chronic home O2 use?: NO  Incentive spirometer at bedside: NO       GI:     Current diet:  ADULT DIET; Regular  Passing flatus: YES  Tolerating current diet: YES       Pain Management:   Patient states pain is manageable on current regimen: YES    Skin:     Interventions: internal/external urinary devices    Patient Safety:  Fall Score:    Interventions: bed/chair alarm, gripper socks, and pt to call before getting OOB       Length of Stay:  Expected LOS: 3  Actual LOS: 3      Colleen March RN                            
Nursing contacted Nocturnist/cross cover provider and notified patient asking for something for cough. No other concerns reported. VSS. Completed dayshift note not available for review. On review of recent chest imaging and orders appears being treated for pna on zosyn already. Being r/o covid. Ordered mucinex 600mg bid prn. Patient denies any further complaints or concerns. No acute distress reported. Nursing to notify Hospitalist for further/continued concerns. Will remain available overnight for further concerns if nursing/patient needs. Will defer further evaluation/management to the day shift primary care team.    Non-billable note.     
   Culture PENDING    CBC with Auto Differential    Collection Time: 04/01/24  2:28 AM   Result Value Ref Range    WBC 21.3 (H) 3.6 - 11.0 K/uL    RBC 3.47 (L) 3.80 - 5.20 M/uL    Hemoglobin 9.6 (L) 11.5 - 16.0 g/dL    Hematocrit 29.5 (L) 35.0 - 47.0 %    MCV 85.0 80.0 - 99.0 FL    MCH 27.7 26.0 - 34.0 PG    MCHC 32.5 30.0 - 36.5 g/dL    RDW 15.2 (H) 11.5 - 14.5 %    Platelets 330 150 - 400 K/uL    MPV 11.3 8.9 - 12.9 FL    Nucleated RBCs 0.0 0  WBC    nRBC 0.00 0.00 - 0.01 K/uL    Neutrophils % 72 32 - 75 %    Lymphocytes % 15 12 - 49 %    Monocytes % 9 5 - 13 %    Eosinophils % 2 0 - 7 %    Basophils % 0 0 - 1 %    Immature Granulocytes 2 (H) 0.0 - 0.5 %    Neutrophils Absolute 15.5 (H) 1.8 - 8.0 K/UL    Lymphocytes Absolute 3.1 0.8 - 3.5 K/UL    Monocytes Absolute 1.9 (H) 0.0 - 1.0 K/UL    Eosinophils Absolute 0.4 0.0 - 0.4 K/UL    Basophils Absolute 0.1 0.0 - 0.1 K/UL    Absolute Immature Granulocyte 0.3 (H) 0.00 - 0.04 K/UL    Differential Type AUTOMATED     Basic Metabolic Panel    Collection Time: 04/01/24  2:28 AM   Result Value Ref Range    Sodium 134 (L) 136 - 145 mmol/L    Potassium 3.7 3.5 - 5.1 mmol/L    Chloride 102 97 - 108 mmol/L    CO2 23 21 - 32 mmol/L    Anion Gap 9 5 - 15 mmol/L    Glucose 97 65 - 100 mg/dL    BUN 13 6 - 20 MG/DL    Creatinine 0.68 0.55 - 1.02 MG/DL    Bun/Cre Ratio 19 12 - 20      Est, Glom Filt Rate 86 >60 ml/min/1.73m2    Calcium 9.3 8.5 - 10.1 MG/DL       Imaging:  I have personally reviewed the patient’s radiographs and have reviewed the reports:  3-30-24: CTA of chest:  CT pulmonary angiogram is performed with 2.5 mm collimation. 100 mL of nonionic  IV Isovue-370 was administered for exam. 3D coronal and sagittal postprocessed  images were performed for this examination.     CT dose reduction was achieved through use of a standardized protocol tailored  for this examination and automatic exposure control for dose modulation.     Chest:      CTPA: The pulmonary

## 2024-04-02 NOTE — PLAN OF CARE
Problem: Discharge Planning  Goal: Discharge to home or other facility with appropriate resources  4/1/2024 1132 by Cielo Rodriguez RN  Outcome: Progressing  3/31/2024 2349 by Demetrio Friedman RN  Outcome: Progressing     Problem: Pain  Goal: Verbalizes/displays adequate comfort level or baseline comfort level  4/1/2024 1132 by Cielo Rodriguez RN  Outcome: Progressing  3/31/2024 2349 by Demetrio Friedman RN  Outcome: Progressing     Problem: ABCDS Injury Assessment  Goal: Absence of physical injury  4/1/2024 1132 by Cielo Rodriguez RN  Outcome: Progressing  3/31/2024 2349 by Demetrio Friedman RN  Outcome: Progressing     Problem: Safety - Adult  Goal: Free from fall injury  4/1/2024 1132 by Cielo Rodriguez RN  Outcome: Progressing  3/31/2024 2349 by Demetrio Friedman RN  Outcome: Progressing     
  Problem: Physical Therapy - Adult  Goal: By Discharge: Performs mobility at highest level of function for planned discharge setting.  See evaluation for individualized goals.  Description: FUNCTIONAL STATUS PRIOR TO ADMISSION: Patient was independent and active without use of DME. Reports 2 recent falls. Denies home O2 use.     HOME SUPPORT PRIOR TO ADMISSION: The patient lived with .    Physical Therapy Goals  Initiated 4/1/2024  1.  Patient will move from supine to sit and sit to supine, scoot up and down, and roll side to side in bed with independence within 7 day(s).    2.  Patient will perform sit to stand with modified independence within 7 day(s).  3.  Patient will transfer from bed to chair and chair to bed with modified independence using the least restrictive device within 7 day(s).  4.  Patient will ambulate with modified independence for 150 feet with the least restrictive device within 7 day(s).   5.  Patient will ascend/descend 4 stairs with 1 handrail(s) with modified independence within 7 day(s).   Outcome: Progressing   PHYSICAL THERAPY EVALUATION    Patient: Bandar Montano (83 y.o. female)  Date: 4/1/2024  Primary Diagnosis: Multifocal pneumonia [J18.9]  Community acquired pneumonia, bilateral [J18.9]       Precautions: Restrictions/Precautions: Fall Risk, Bed Alarm                      ASSESSMENT :   DEFICITS/IMPAIRMENTS:   The patient is limited by impaired activity tolerance, decreased endurance, tremulousness, generalized weakness, impaired standing balance, chronic cough, and overall impaired functional mobility. Pt mobilized with CGAx1 throughout, exhibiting decreased gait speed with shuffled gait pattern and full body tremors (pt attributed tremors to feeling cold). Pt noted to be touching down on furniture/walls to assist in stability during gait training. HR elevated to 117bpm with activity, all other VSS however pt significantly fatigued at completion of mobility. Anticipate 
Speech LAnguage Pathology EVALUATION/DISCHARGE    Patient: Bandar Montano (83 y.o. female)  Date: 4/2/2024  Primary Diagnosis: Multifocal pneumonia [J18.9]  Community acquired pneumonia, bilateral [J18.9]       Precautions: aspiration precautions, reflux precautions, Fall Risk, Bed Alarm                  ASSESSMENT :  Based on the objective data described below, the patient presents with seemingly functional oropharyngeal swallow function. Oral structures and functions seemingly WNL. Patient with vocal quality WNL. Currently tolerating RA with intermittent baseline (dry) cough noted. Breakfast tray present at bedside and patient independently fed herself scrambled eggs, hernandez, biscuit and gravy, and juice. No immediate overt clinical s/s aspiration noted across PO trials. Patient denies globus sensation during breakfast but does endorse intermittent globus sensation with meats and pills (which liquid wash helps clear sensation). Patient also endorses occasional feeling of regurgitation (stating it is like fluid coming back up). Patient states she occasionally gets heartburn which she takes Tums or other over-the-counter drug for. Suspect esophageal component. SLP educated patient on aspiration and reflux precautions. No further acute SLP services warranted at this time. Consider possible GI consult. SLP will sign off. Please re-consult if concerns arise in the future.      Patient will be discharged from skilled speech-language pathology services at this time.     PLAN :  Recommendations and Planned Interventions:  Diet: Regular and thin liquids  --Medications as tolerated   --Upright all PO intake   --Oral hygiene 2-3x/day  --Consider GI consult     Reflux precautions:  · Sit fully upright at 90 for meals (prefer that meals are eating while sitting in a chair)  · Remain up for 1-hour after meals  · Consider 6-smaller meals throughout the day (eat about every 2-3 hours) instead of large meals  · Avoid acidic 
(2004) Assessment of post-stroke quality of life in cost-effectiveness studies: The usefulness of the Barthel Index and the EuroQoL-5D. Quality of Life Research, 13, 719-47                                                                                                                                                                                                                                 Pain Ratin/10   Pain Intervention(s):   pain is at a level acceptable to the patient    Activity Tolerance:   Fair , requires rest breaks, and HR 110s    After treatment:   Patient left in no apparent distress sitting up in chair, Call bell within reach, Bed/ chair alarm activated, and Updated patient's board on functional status and mobility recommendations    COMMUNICATION/EDUCATION:   The patient's plan of care was discussed with: physical therapist and registered nurse    Patient Education  Education Given To: Patient  Education Provided: Role of Therapy;Transfer Training;Equipment;Plan of Care;Energy Conservation;Fall Prevention Strategies;Precautions;ADL Adaptive Strategies  Education Method: Demonstration;Verbal;Teach Back  Barriers to Learning: None  Education Outcome: Verbalized understanding;Demonstrated understanding;Continued education needed    Thank you for this referral.  MARY ARAIS, OT  Minutes: 21    Occupational Therapy Evaluation Charge Determination   History Examination Decision-Making   LOW Complexity : Brief history review  LOW Complexity: 1-3 Performance deficits relating to physical, cognitive, or psychosocial skills that result in activity limitations and/or participation restrictions MEDIUM Complexity: Patient may present with comorbidities that affect occupational performance. Minimal to moderate modifications of tasks or assist (eg. physical or verbal) with assist is necessary to enable pt to complete eval   Based on the above components, the patient evaluation is determined to be of the

## 2024-06-04 RX ORDER — TIZANIDINE 2 MG/1
2 TABLET ORAL EVERY 6 HOURS PRN
COMMUNITY

## 2024-06-04 RX ORDER — LANOLIN ALCOHOL/MO/W.PET/CERES
400 CREAM (GRAM) TOPICAL DAILY
COMMUNITY

## 2024-06-05 ENCOUNTER — HOSPITAL ENCOUNTER (OUTPATIENT)
Facility: HOSPITAL | Age: 84
Setting detail: OUTPATIENT SURGERY
Discharge: HOME | End: 2024-06-05
Attending: SPECIALIST | Admitting: SPECIALIST
Payer: MEDICARE

## 2024-06-05 ENCOUNTER — ANESTHESIA EVENT (OUTPATIENT)
Facility: HOSPITAL | Age: 84
End: 2024-06-05
Payer: MEDICARE

## 2024-06-05 ENCOUNTER — ANESTHESIA (OUTPATIENT)
Facility: HOSPITAL | Age: 84
End: 2024-06-05
Payer: MEDICARE

## 2024-06-05 VITALS
WEIGHT: 147 LBS | OXYGEN SATURATION: 94 % | DIASTOLIC BLOOD PRESSURE: 58 MMHG | HEIGHT: 67 IN | TEMPERATURE: 97.1 F | BODY MASS INDEX: 23.07 KG/M2 | RESPIRATION RATE: 13 BRPM | HEART RATE: 57 BPM | SYSTOLIC BLOOD PRESSURE: 152 MMHG

## 2024-06-05 PROCEDURE — 7100000011 HC PHASE II RECOVERY - ADDTL 15 MIN: Performed by: SPECIALIST

## 2024-06-05 PROCEDURE — 2709999900 HC NON-CHARGEABLE SUPPLY: Performed by: SPECIALIST

## 2024-06-05 PROCEDURE — 3700000001 HC ADD 15 MINUTES (ANESTHESIA): Performed by: SPECIALIST

## 2024-06-05 PROCEDURE — 3700000000 HC ANESTHESIA ATTENDED CARE: Performed by: SPECIALIST

## 2024-06-05 PROCEDURE — 2500000003 HC RX 250 WO HCPCS: Performed by: NURSE ANESTHETIST, CERTIFIED REGISTERED

## 2024-06-05 PROCEDURE — 7100000010 HC PHASE II RECOVERY - FIRST 15 MIN: Performed by: SPECIALIST

## 2024-06-05 PROCEDURE — 2580000003 HC RX 258: Performed by: SPECIALIST

## 2024-06-05 PROCEDURE — C1769 GUIDE WIRE: HCPCS | Performed by: SPECIALIST

## 2024-06-05 PROCEDURE — 6360000002 HC RX W HCPCS: Performed by: NURSE ANESTHETIST, CERTIFIED REGISTERED

## 2024-06-05 PROCEDURE — 88305 TISSUE EXAM BY PATHOLOGIST: CPT

## 2024-06-05 PROCEDURE — 2580000003 HC RX 258: Performed by: NURSE ANESTHETIST, CERTIFIED REGISTERED

## 2024-06-05 PROCEDURE — 3600007502: Performed by: SPECIALIST

## 2024-06-05 PROCEDURE — 3600007512: Performed by: SPECIALIST

## 2024-06-05 RX ORDER — SODIUM CHLORIDE 9 MG/ML
25 INJECTION, SOLUTION INTRAVENOUS PRN
Status: DISCONTINUED | OUTPATIENT
Start: 2024-06-05 | End: 2024-06-05 | Stop reason: HOSPADM

## 2024-06-05 RX ORDER — PHENYLEPHRINE HCL IN 0.9% NACL 0.4MG/10ML
SYRINGE (ML) INTRAVENOUS PRN
Status: DISCONTINUED | OUTPATIENT
Start: 2024-06-05 | End: 2024-06-05 | Stop reason: SDUPTHER

## 2024-06-05 RX ORDER — SODIUM CHLORIDE 9 MG/ML
INJECTION, SOLUTION INTRAVENOUS CONTINUOUS PRN
Status: DISCONTINUED | OUTPATIENT
Start: 2024-06-05 | End: 2024-06-05 | Stop reason: SDUPTHER

## 2024-06-05 RX ORDER — LIDOCAINE HYDROCHLORIDE 20 MG/ML
INJECTION, SOLUTION EPIDURAL; INFILTRATION; INTRACAUDAL; PERINEURAL PRN
Status: DISCONTINUED | OUTPATIENT
Start: 2024-06-05 | End: 2024-06-05 | Stop reason: SDUPTHER

## 2024-06-05 RX ORDER — SODIUM CHLORIDE 0.9 % (FLUSH) 0.9 %
5-40 SYRINGE (ML) INJECTION PRN
Status: DISCONTINUED | OUTPATIENT
Start: 2024-06-05 | End: 2024-06-05 | Stop reason: HOSPADM

## 2024-06-05 RX ORDER — SODIUM CHLORIDE 0.9 % (FLUSH) 0.9 %
5-40 SYRINGE (ML) INJECTION EVERY 12 HOURS SCHEDULED
Status: DISCONTINUED | OUTPATIENT
Start: 2024-06-05 | End: 2024-06-05 | Stop reason: HOSPADM

## 2024-06-05 RX ADMIN — PROPOFOL 20 MG: 10 INJECTION, EMULSION INTRAVENOUS at 10:47

## 2024-06-05 RX ADMIN — PROPOFOL 20 MG: 10 INJECTION, EMULSION INTRAVENOUS at 11:10

## 2024-06-05 RX ADMIN — PROPOFOL 40 MG: 10 INJECTION, EMULSION INTRAVENOUS at 11:14

## 2024-06-05 RX ADMIN — PROPOFOL 20 MG: 10 INJECTION, EMULSION INTRAVENOUS at 11:23

## 2024-06-05 RX ADMIN — PROPOFOL 20 MG: 10 INJECTION, EMULSION INTRAVENOUS at 10:55

## 2024-06-05 RX ADMIN — PROPOFOL 20 MG: 10 INJECTION, EMULSION INTRAVENOUS at 11:26

## 2024-06-05 RX ADMIN — PROPOFOL 40 MG: 10 INJECTION, EMULSION INTRAVENOUS at 11:04

## 2024-06-05 RX ADMIN — Medication 80 MCG: at 11:00

## 2024-06-05 RX ADMIN — PROPOFOL 20 MG: 10 INJECTION, EMULSION INTRAVENOUS at 10:45

## 2024-06-05 RX ADMIN — SODIUM CHLORIDE: 9 INJECTION, SOLUTION INTRAVENOUS at 10:22

## 2024-06-05 RX ADMIN — PROPOFOL 20 MG: 10 INJECTION, EMULSION INTRAVENOUS at 11:19

## 2024-06-05 RX ADMIN — PROPOFOL 20 MG: 10 INJECTION, EMULSION INTRAVENOUS at 10:59

## 2024-06-05 RX ADMIN — LIDOCAINE HYDROCHLORIDE 100 MG: 20 INJECTION, SOLUTION EPIDURAL; INFILTRATION; INTRACAUDAL; PERINEURAL at 10:43

## 2024-06-05 RX ADMIN — SODIUM CHLORIDE 25 ML: 9 INJECTION, SOLUTION INTRAVENOUS at 10:01

## 2024-06-05 RX ADMIN — PROPOFOL 20 MG: 10 INJECTION, EMULSION INTRAVENOUS at 11:16

## 2024-06-05 RX ADMIN — PROPOFOL 100 MG: 10 INJECTION, EMULSION INTRAVENOUS at 10:43

## 2024-06-05 ASSESSMENT — PAIN - FUNCTIONAL ASSESSMENT: PAIN_FUNCTIONAL_ASSESSMENT: 0-10

## 2024-06-05 NOTE — ANESTHESIA PRE PROCEDURE
Department of Anesthesiology  Preprocedure Note       Name:  Bandar Montano   Age:  84 y.o.  :  1940                                          MRN:  [id number]         Date:  2024      Surgeon: Surgeon(s):  Demarco Urbina MD    Procedure: Procedure(s):  COLONOSCOPY  ESOPHAGOGASTRODUODENOSCOPY    Medications prior to admission:   Prior to Admission medications    Medication Sig Start Date End Date Taking? Authorizing Provider   tiZANidine (ZANAFLEX) 2 MG tablet Take 1 tablet by mouth every 6 hours as needed   Yes Addison Mccallum MD   magnesium oxide (MAG-OX) 400 (240 Mg) MG tablet Take 1 tablet by mouth daily   Yes Addison Mccallum MD   pantoprazole (PROTONIX) 40 MG tablet Take 1 tablet by mouth 2 times daily (before meals) 24   Jenny Davidson MD   famotidine (PEPCID) 20 MG tablet Take 1 tablet by mouth 2 times daily 24   Jenny Davidson MD   losartan (COZAAR) 50 MG tablet Take 1 tablet by mouth daily    ProviderAddison MD   Calcium Carb-Cholecalciferol 600-20 MG-MCG CHEW Take by mouth daily    Automatic Reconciliation, Ar   citalopram (CELEXA) 20 MG tablet Take by mouth 3/18/11   Automatic Reconciliation, Ar   lovastatin (MEVACOR) 20 MG tablet Take by mouth    Automatic Reconciliation, Ar   senna-docusate (PERICOLACE) 8.6-50 MG per tablet Take 1 tablet by mouth daily 19   Automatic Reconciliation, Ar   Timolol (TIMOPTIC) 0.5 % SOLN ophthalmic solution Apply 1 drop to eye 3/18/11   Automatic Reconciliation, Ar       Current medications:    Current Facility-Administered Medications   Medication Dose Route Frequency Provider Last Rate Last Admin   • sodium chloride flush 0.9 % injection 5-40 mL  5-40 mL IntraVENous 2 times per day Demarco Urbina MD       • sodium chloride flush 0.9 % injection 5-40 mL  5-40 mL IntraVENous PRN Demarco Urbina MD       • 0.9 % sodium chloride infusion  25 mL IntraVENous PRN Demarco Urbina MD           Allergies:

## 2024-06-05 NOTE — OP NOTE
Esophagogastroduodenoscopy Procedure Note      Bandar Montano  1940  [id number]    Indication: Dyphagia/odynophagia     Endoscopist: Demarco Urbina MD    Referring Provider:  Hayden Lopez MD    Sedation:  MAC anesthesia Propofol    Procedure Details:  After infomed consent was obtained for the procedure, with all risks and benefits of procedure explained the patient was taken to the endoscopy suite and placed in the left lateral decubitus position.  Following sequential administration of sedation as per above, the endoscope was inserted into the mouth and advanced under direct vision to second portion of the duodenum.  A careful inspection was made as the gastroscope was withdrawn, including a retroflexed view of the proximal stomach; findings and interventions are described below.      Findings:     Esophagus:   + Tortuous corkscrew esophagus with normal mucosa s/p Bx of mid esophagus s/p Dilation with 54 FR Savary over wire  + Z line normal at 37 cm with 3 cm Hiatal Hernia    Stomach:   + Mild erythema in the body/antrum s/p Bxs.    Duodenum:   - The bulb and post bulbar mucosa is normal in appearance to the second portion. The duodenal folds appeared normal.  Cold forceps biopsies r/o celiac.     Therapies:  see above    Specimen: Specimens were collected as described and send to the laboratory.           Complications:   None were encountered during the procedure.    EBL:  < 10 ml.          Recommendations:   -GERD diet  -acid suppression      Demarco Urbina MD  6/5/2024  11:31 AM            
MD Ciara                        June 5, 2024

## 2024-06-05 NOTE — DISCHARGE INSTRUCTIONS
Bandar Montano  [id number]  1940    COLON / EGD DISCHARGE INSTRUCTIONS  Discomfort:  Sore throat- throat lozenges or warm salt water gargle  Redness at IV site- apply warm compress to area; if redness or soreness persist- contact your physician  There may be a slight amount of blood passed from the rectum  Gaseous discomfort- walking, belching will help relieve any discomfort  You may not operate a vehicle for 12 hours  You may not engage in an occupation involving machinery or appliances for rest of today  You may not drink alcoholic beverages for at least 12 hours  Avoid making any critical decisions for at least 24 hour  DIET:      - however -  remember your colon is empty and a heavy meal will produce gas.   Avoid these foods:  vegetables, fried / greasy foods, carbonated drinks for today     ACTIVITY:  You may  resume your normal daily activities it is recommended that you spend the remainder of the day resting -  avoid any strenuous activity.  CALL M.D.  ANY SIGN OF:   Increasing pain, nausea, vomiting  Abdominal distension (swelling)  New increased bleeding (oral or rectal)  Fever (chills)  Pain in chest area  Bloody discharge from nose or mouth  Shortness of breath  Tylenol as needed for pain.      Follow-up Instructions:   Call Dr. Urbina for results of procedure / biopsy in 7 days at telephone #  [phone]  Additional instructions:   Continue medications: -Fiber supplement twice daily; -continue medications for acid reflux    Findings:  EGD: Hiatal Hernia, dilation performed  Colonoscopy: severe diverticulosis                                                                                              Bandar Montano  [id number]  1940        DISCHARGE SUMMARY from Nurse    The following personal items collected during your admission are returned to you:   Dental Appliance:    Vision:    Hearing Aid:    Jewelry:    Clothing:    Other Valuables:    Valuables sent to safe: Dose (mL/hr)

## 2024-06-05 NOTE — ANESTHESIA POSTPROCEDURE EVALUATION
Department of Anesthesiology  Postprocedure Note    Patient: Bandar Montano  MRN: [id number]  YOB: 1940  Date of evaluation: 6/5/2024    Procedure Summary       Date: 06/05/24 Room / Location: [facility] ENDO 01 / [facility] ENDOSCOPY    Anesthesia Start: 1041 Anesthesia Stop: 1137    Procedures:       COLONOSCOPY      ESOPHAGOGASTRODUODENOSCOPY Diagnosis:       Chronic idiopathic constipation      Change in bowel habits      Heartburn      Esophageal dysphagia      (Chronic idiopathic constipation [K59.04])      (Change in bowel habits [R19.4])      (Heartburn [R12])      (Esophageal dysphagia [R13.19])    Surgeons: Demarco Urbina MD Responsible Provider: Moe Otero MD    Anesthesia Type: MAC ASA Status: 2            Anesthesia Type: MAC    Andrew Phase I: Andrew Score: 10    Andrew Phase II: Andrew Score: 10    Anesthesia Post Evaluation    Patient location during evaluation: PACU  Patient participation: complete - patient participated  Level of consciousness: awake and alert  Airway patency: patent  Nausea & Vomiting: no nausea  Cardiovascular status: hemodynamically stable  Respiratory status: acceptable  Hydration status: euvolemic    No notable events documented.

## 2024-06-05 NOTE — PROGRESS NOTES
ARRIVAL INFORMATION:  Verified patient name and date of birth, scheduled procedure, and informed consent.     : Marilou (daughter) contact number: [phone]  Physician and staff can share information with the .     Belongings with patient include:  Clothing,Glasses, Dentures upper and lower, Jewelry(1 ring on patient)    GI FOCUSED ASSESSMENT:  Neuro: Awake, alert, oriented x4  Respiratory: even and unlabored   GI: soft and non-distended  EKG Rhythm: normal sinus rhythm    Education:Reviewed general discharge instructions and  information.  The risks and benefits of the bite block have been explained to patient.  Patient verbalizes understanding.    Patient stated she drank approximately 3/4 of 4L bowel prep. She last ate solid food on Monday 6/3 at 1pm. Patient stated her last BM was light brown liquid. Notified Dr. Urbina. Verbal orders to proceed with scheduled procedures.

## 2024-06-05 NOTE — H&P
Gastroenterology Outpatient History and Physical    Patient: Bandar HERNANDEZ Dusty    Physician: Demarco Urbina MD    Vital Signs: Blood pressure (!) 165/62, pulse 62, temperature 98.4 °F (36.9 °C), temperature source Temporal, resp. rate 18, height 1.689 m (5' 6.5\"), weight 66.7 kg (147 lb), SpO2 98 %.    Allergies:   Allergies   Allergen Reactions    Nsaids Other (See Comments)     Pt has stomach ulcer    Codeine Nausea And Vomiting       Chief Complaint: Dysphagia, H/O Colon Polyps    History of Present Illness: above    Justification for Procedure: above    History:  Past Medical History:   Diagnosis Date    Arrhythmia     ?IRREGULAR HEARTBEAT    Arthritis     BACK    Colon polyps     Dr Marcelo    Diverticulitis     GERD (gastroesophageal reflux disease)     OCCASIONALLY    Hypertension     Ill-defined condition     stomach ulcer    Nausea & vomiting     Other ill-defined conditions(799.89)     DIVERTICULOSIS (HX: DIVERTICULITIS)    Other ill-defined conditions(799.89)     HI CHOLESTEROL    Psychiatric disorder     DEPRESSION      Past Surgical History:   Procedure Laterality Date    APPENDECTOMY      CATARACT REMOVAL      BILAT WITH IOL with bilateral lens implants    COLONOSCOPY N/A 11/13/2019    COLONOSCOPY performed by Demarco Urbina MD at [facility] ENDOSCOPY    COLONOSCOPY FLX DX W/COLLJ SPEC WHEN PFRMD  6/25/2012         HYSTERECTOMY (CERVIX STATUS UNKNOWN)      KNEE ARTHROSCOPY      RIGHT KNEE     MOHS SURGERY      RIGHT SHOULDER--ROTATOR CUFF    ORTHOPEDIC SURGERY Right     shoulder - torn rotatory cuff and bone spur    ORTHOPEDIC SURGERY  6/2009    RIGHT TKR    OVARY REMOVAL      [place] UNLISTED PROCEDURE ABDOMEN PERITONEUM & OMENTUM      LYSIS OF ADHESIONS    TUBAL LIGATION      UROLOGICAL SURGERY      bladder sling      Social History     Socioeconomic History    Marital status:      Spouse name: None    Number of children: None    Years of education: None    Highest education level: None   Tobacco Use

## 2024-06-05 NOTE — PROGRESS NOTES
1050  (egd dcope)Procedure scope was pre-cleaned, per protocol, at bedside by Alexus.      1109 (Adult scope)Procedure scope was pre-cleaned, per protocol, at bedside by Alexus.      Endoscopy Case End Note:    (Ped scopeProcedure scope was pre-cleaned, per protocol, at bedside by Alexus.      Report received from anesthesia.  See anesthesia flowsheet for intra-procedure vital signs and events.    Belongings returned to patient.

## 2024-09-03 ENCOUNTER — TRANSCRIBE ORDERS (OUTPATIENT)
Facility: HOSPITAL | Age: 84
End: 2024-09-03

## 2024-09-03 DIAGNOSIS — Z12.31 SCREENING MAMMOGRAM FOR HIGH-RISK PATIENT: Primary | ICD-10-CM

## 2024-10-23 ENCOUNTER — APPOINTMENT (OUTPATIENT)
Facility: HOSPITAL | Age: 84
End: 2024-10-23
Payer: MEDICARE

## 2024-10-23 ENCOUNTER — HOSPITAL ENCOUNTER (EMERGENCY)
Facility: HOSPITAL | Age: 84
Discharge: HOME | End: 2024-10-23
Attending: STUDENT IN AN ORGANIZED HEALTH CARE EDUCATION/TRAINING PROGRAM
Payer: MEDICARE

## 2024-10-23 VITALS
TEMPERATURE: 98.2 F | WEIGHT: 155 LBS | BODY MASS INDEX: 24.91 KG/M2 | SYSTOLIC BLOOD PRESSURE: 142 MMHG | DIASTOLIC BLOOD PRESSURE: 66 MMHG | RESPIRATION RATE: 16 BRPM | HEIGHT: 66 IN | OXYGEN SATURATION: 95 % | HEART RATE: 61 BPM

## 2024-10-23 DIAGNOSIS — M54.50 ACUTE MIDLINE LOW BACK PAIN WITHOUT SCIATICA: Primary | ICD-10-CM

## 2024-10-23 LAB
ALBUMIN SERPL-MCNC: 3.8 G/DL (ref 3.5–5)
ALBUMIN/GLOB SERPL: 0.9 (ref 1.1–2.2)
ALP SERPL-CCNC: 75 U/L (ref 45–117)
ALT SERPL-CCNC: 17 U/L (ref 12–78)
ANION GAP SERPL CALC-SCNC: 3 MMOL/L (ref 2–12)
APPEARANCE UR: CLEAR
AST SERPL-CCNC: 16 U/L (ref 15–37)
BACTERIA URNS QL MICRO: NEGATIVE /HPF
BASOPHILS # BLD: 0 K/UL (ref 0–0.1)
BASOPHILS NFR BLD: 0 % (ref 0–1)
BILIRUB SERPL-MCNC: 0.2 MG/DL (ref 0.2–1)
BILIRUB UR QL: NEGATIVE
BUN SERPL-MCNC: 18 MG/DL (ref 6–20)
BUN/CREAT SERPL: 18 (ref 12–20)
CALCIUM SERPL-MCNC: 9.3 MG/DL (ref 8.5–10.1)
CHLORIDE SERPL-SCNC: 103 MMOL/L (ref 97–108)
CO2 SERPL-SCNC: 31 MMOL/L (ref 21–32)
COLOR UR: NORMAL
CREAT SERPL-MCNC: 0.99 MG/DL (ref 0.55–1.02)
DIFFERENTIAL METHOD BLD: ABNORMAL
EOSINOPHIL # BLD: 0.3 K/UL (ref 0–0.4)
EOSINOPHIL NFR BLD: 3 % (ref 0–7)
EPITH CASTS URNS QL MICRO: NORMAL /LPF
ERYTHROCYTE [DISTWIDTH] IN BLOOD BY AUTOMATED COUNT: 15.9 % (ref 11.5–14.5)
GLOBULIN SER CALC-MCNC: 4.2 G/DL (ref 2–4)
GLUCOSE SERPL-MCNC: 114 MG/DL (ref 65–100)
GLUCOSE UR STRIP.AUTO-MCNC: NEGATIVE MG/DL
HCT VFR BLD AUTO: 36.3 % (ref 35–47)
HGB BLD-MCNC: 12.1 G/DL (ref 11.5–16)
HGB UR QL STRIP: NEGATIVE
HYALINE CASTS URNS QL MICRO: NORMAL /LPF (ref 0–5)
IMM GRANULOCYTES # BLD AUTO: 0 K/UL (ref 0–0.04)
IMM GRANULOCYTES NFR BLD AUTO: 0 % (ref 0–0.5)
KETONES UR QL STRIP.AUTO: NEGATIVE MG/DL
LEUKOCYTE ESTERASE UR QL STRIP.AUTO: NEGATIVE
LYMPHOCYTES # BLD: 2.6 K/UL (ref 0.8–3.5)
LYMPHOCYTES NFR BLD: 28 % (ref 12–49)
MCH RBC QN AUTO: 28.1 PG (ref 26–34)
MCHC RBC AUTO-ENTMCNC: 33.3 G/DL (ref 30–36.5)
MCV RBC AUTO: 84.2 FL (ref 80–99)
MONOCYTES # BLD: 1 K/UL (ref 0–1)
MONOCYTES NFR BLD: 10 % (ref 5–13)
NEUTS SEG # BLD: 5.6 K/UL (ref 1.8–8)
NEUTS SEG NFR BLD: 59 % (ref 32–75)
NITRITE UR QL STRIP.AUTO: NEGATIVE
NRBC # BLD: 0 K/UL (ref 0–0.01)
NRBC BLD-RTO: 0 PER 100 WBC
PH UR STRIP: 6.5 (ref 5–8)
PLATELET # BLD AUTO: 342 K/UL (ref 150–400)
PMV BLD AUTO: 11.1 FL (ref 8.9–12.9)
POTASSIUM SERPL-SCNC: 4.1 MMOL/L (ref 3.5–5.1)
PROT SERPL-MCNC: 8 G/DL (ref 6.4–8.2)
PROT UR STRIP-MCNC: NEGATIVE MG/DL
RBC # BLD AUTO: 4.31 M/UL (ref 3.8–5.2)
RBC #/AREA URNS HPF: NORMAL /HPF (ref 0–5)
SODIUM SERPL-SCNC: 137 MMOL/L (ref 136–145)
SP GR UR REFRACTOMETRY: 1
URINE CULTURE IF INDICATED: NORMAL
UROBILINOGEN UR QL STRIP.AUTO: 0.2 EU/DL (ref 0.2–1)
WBC # BLD AUTO: 9.5 K/UL (ref 3.6–11)
WBC URNS QL MICRO: NORMAL /HPF (ref 0–4)

## 2024-10-23 PROCEDURE — 72100 X-RAY EXAM L-S SPINE 2/3 VWS: CPT

## 2024-10-23 PROCEDURE — 85025 COMPLETE CBC W/AUTO DIFF WBC: CPT

## 2024-10-23 PROCEDURE — 99284 EMERGENCY DEPT VISIT MOD MDM: CPT

## 2024-10-23 PROCEDURE — 81001 URINALYSIS AUTO W/SCOPE: CPT

## 2024-10-23 PROCEDURE — 6370000000 HC RX 637 (ALT 250 FOR IP): Performed by: STUDENT IN AN ORGANIZED HEALTH CARE EDUCATION/TRAINING PROGRAM

## 2024-10-23 PROCEDURE — 72072 X-RAY EXAM THORAC SPINE 3VWS: CPT

## 2024-10-23 PROCEDURE — 80053 COMPREHEN METABOLIC PANEL: CPT

## 2024-10-23 RX ORDER — ACETAMINOPHEN 500 MG
1000 TABLET ORAL
Status: COMPLETED | OUTPATIENT
Start: 2024-10-23 | End: 2024-10-23

## 2024-10-23 RX ORDER — GABAPENTIN 100 MG/1
100 CAPSULE ORAL 2 TIMES DAILY
Qty: 60 CAPSULE | Refills: 0 | Status: SHIPPED | OUTPATIENT
Start: 2024-10-23 | End: 2024-11-22

## 2024-10-23 RX ORDER — GABAPENTIN 100 MG/1
100 CAPSULE ORAL
Status: COMPLETED | OUTPATIENT
Start: 2024-10-23 | End: 2024-10-23

## 2024-10-23 RX ADMIN — ACETAMINOPHEN 1000 MG: 500 TABLET ORAL at 20:06

## 2024-10-23 RX ADMIN — GABAPENTIN 100 MG: 100 CAPSULE ORAL at 20:06

## 2024-10-23 RX ADMIN — TIZANIDINE 4 MG: 4 TABLET ORAL at 20:11

## 2024-10-23 ASSESSMENT — PAIN SCALES - GENERAL
PAINLEVEL_OUTOF10: 4
PAINLEVEL_OUTOF10: 4

## 2024-10-23 ASSESSMENT — LIFESTYLE VARIABLES
HOW OFTEN DO YOU HAVE A DRINK CONTAINING ALCOHOL: MONTHLY OR LESS
HOW MANY STANDARD DRINKS CONTAINING ALCOHOL DO YOU HAVE ON A TYPICAL DAY: 1 OR 2

## 2024-10-23 ASSESSMENT — PAIN DESCRIPTION - ORIENTATION: ORIENTATION: LOWER

## 2024-10-23 ASSESSMENT — PAIN DESCRIPTION - DESCRIPTORS
DESCRIPTORS: SQUEEZING;STABBING
DESCRIPTORS: SQUEEZING;STABBING

## 2024-10-23 ASSESSMENT — PAIN DESCRIPTION - LOCATION
LOCATION: BACK
LOCATION: BACK

## 2024-10-24 NOTE — DISCHARGE INSTRUCTIONS
Thank You!    It was a pleasure taking care of you in our Emergency Department today. We know that when you come to our Emergency Department, you are entrusting us with your health, comfort, and safety. Our physicians and nurses honor that trust, and truly appreciate the opportunity to care for you and your loved ones.      We also value your feedback. If you receive a survey about your Emergency Department experience today, please fill it out.  We care about our patients' feedback, and we listen to what you have to say.  Thank you.    Dani Hernandez MD  ________________________________________________________________________  I have included a copy of your lab results and/or radiologic studies from today's visit so you can have them easily available at your follow-up visit. We hope you feel better and please do not hesitate to contact the ED if you have any questions at all!    Recent Results (from the past 12 hour(s))   Urinalysis with Reflex to Culture    Collection Time: 10/23/24  5:27 PM    Specimen: Urine   Result Value Ref Range    Color, UA YELLOW/STRAW      Appearance CLEAR CLEAR      Specific Gravity, UA 1.005      pH, Urine 6.5 5.0 - 8.0      Protein, UA Negative NEG mg/dL    Glucose, Ur Negative NEG mg/dL    Ketones, Urine Negative NEG mg/dL    Bilirubin, Urine Negative NEG      Blood, Urine Negative NEG      Urobilinogen, Urine 0.2 0.2 - 1.0 EU/dL    Nitrite, Urine Negative NEG      Leukocyte Esterase, Urine Negative NEG      WBC, UA 0-4 0 - 4 /hpf    RBC, UA 0-5 0 - 5 /hpf    Epithelial Cells, UA FEW FEW /lpf    BACTERIA, URINE Negative NEG /hpf    Urine Culture if Indicated CULTURE NOT INDICATED BY UA RESULT CNI      Hyaline Casts, UA 0-2 0 - 5 /lpf   CBC with Auto Differential    Collection Time: 10/23/24  6:49 PM   Result Value Ref Range    WBC 9.5 3.6 - 11.0 K/uL    RBC 4.31 3.80 - 5.20 M/uL    Hemoglobin 12.1 11.5 - 16.0 g/dL    Hematocrit 36.3 35.0 - 47.0 %    MCV 84.2 80.0 - 99.0 FL    [facility]

## 2024-10-24 NOTE — ED PROVIDER NOTES
Score       Curb-65          EMERGENCY DEPARTMENT COURSE and DIFFERENTIAL DIAGNOSIS/MDM   Vitals:    Vitals:    10/23/24 2100 10/23/24 2115 10/23/24 2130 10/23/24 2145   BP: 138/69 (!) 145/65 (!) 146/64 (!) 142/66   Pulse:       Resp:       Temp:    98.2 °F (36.8 °C)   TempSrc:    Oral   SpO2:       Weight:       Height:                Patient was given the following medications:  Medications   gabapentin (NEURONTIN) capsule 100 mg (100 mg Oral Given 10/23/24 2006)   acetaminophen (TYLENOL) tablet 1,000 mg (1,000 mg Oral Given 10/23/24 2006)   tiZANidine (ZANAFLEX) tablet 4 mg (4 mg Oral Given 10/23/24 2011)       CONSULTS: (Who and What was discussed)  None      Chronic Conditions:     Social Determinants affecting Dx or Tx: None    Records Reviewed (source and summary of external notes): Nursing Notes    CC/HPI Summary, DDx, ED Course, and Reassessment:     84-year-old female presenting to the emergency department with back pain.  No red flag symptoms aside from stool incontinence which has been present for several months.  No neurologic changes.  Will obtain x-ray of thoracic and lumbar spine to screen for compression fracture or other bony abnormality.  Ordered oral Zanaflex, gabapentin, Tylenol.  Labs ordered prior to my evaluation and reassuring, patient does not have a UTI.  Anticipate discharge home if x-ray reassuring.    X-ray shows degenerative disc disease but no acute process.  Patient did have some improvement in symptoms with meds.  Patient discharged home.         Disposition Considerations (Tests not done, Shared Decision Making, Pt Expectation of Test or Tx.):      FINAL IMPRESSION     1. Acute midline low back pain without sciatica          DISPOSITION/PLAN   DISPOSITION Decision To Discharge 10/23/2024 09:39:48 PM      Discharge Note: The patient is stable for discharge home. The signs, symptoms, diagnosis, and discharge instructions have been discussed, understanding conveyed, and agreed upon.  syntax, homophones, and other interpretive errors are inadvertently transcribed by the computer software. Please disregards these errors. Please excuse any errors that have escaped final proofreading.)         Dani Hernandez MD  10/25/24 4615

## 2024-12-16 ENCOUNTER — HOSPITAL ENCOUNTER (OUTPATIENT)
Facility: HOSPITAL | Age: 84
Discharge: HOME | End: 2024-12-19
Attending: PHYSICAL MEDICINE & REHABILITATION
Payer: MEDICARE

## 2024-12-16 DIAGNOSIS — M51.34 DDD (DEGENERATIVE DISC DISEASE), THORACIC: ICD-10-CM

## 2024-12-16 DIAGNOSIS — M54.14 RADICULOPATHY, THORACIC REGION: ICD-10-CM

## 2024-12-16 DIAGNOSIS — M54.6 THORACIC SPINE PAIN: ICD-10-CM

## 2024-12-16 DIAGNOSIS — M41.54 SCOLIOSIS OF THORACIC REGION DUE TO DEGENERATIVE DISEASE OF SPINE IN ADULT: ICD-10-CM

## 2024-12-16 PROCEDURE — 72146 MRI CHEST SPINE W/O DYE: CPT

## 2025-01-22 ENCOUNTER — HOSPITAL ENCOUNTER (OUTPATIENT)
Facility: HOSPITAL | Age: 85
Discharge: HOME | End: 2025-01-25
Payer: MEDICARE

## 2025-01-22 ENCOUNTER — TRANSCRIBE ORDERS (OUTPATIENT)
Facility: HOSPITAL | Age: 85
End: 2025-01-22

## 2025-01-22 DIAGNOSIS — K58.1 IRRITABLE BOWEL SYNDROME WITH CONSTIPATION: ICD-10-CM

## 2025-01-22 DIAGNOSIS — R15.1 FECAL SMEARING: ICD-10-CM

## 2025-01-22 DIAGNOSIS — K58.1 IRRITABLE BOWEL SYNDROME WITH CONSTIPATION: Primary | ICD-10-CM

## 2025-01-22 PROCEDURE — 74018 RADEX ABDOMEN 1 VIEW: CPT

## 2025-01-23 ENCOUNTER — HOSPITAL ENCOUNTER (OUTPATIENT)
Facility: HOSPITAL | Age: 85
Discharge: HOME | End: 2025-01-26
Payer: MEDICARE

## 2025-01-23 VITALS — HEIGHT: 66 IN | BODY MASS INDEX: 24.91 KG/M2 | WEIGHT: 155 LBS

## 2025-01-23 DIAGNOSIS — Z78.0 ASYMPTOMATIC MENOPAUSAL STATE: ICD-10-CM

## 2025-01-23 DIAGNOSIS — Z12.31 ENCOUNTER FOR SCREENING MAMMOGRAM FOR HIGH-RISK PATIENT: ICD-10-CM

## 2025-01-23 PROCEDURE — 77063 BREAST TOMOSYNTHESIS BI: CPT

## 2025-01-23 PROCEDURE — 77080 DXA BONE DENSITY AXIAL: CPT

## 2025-03-13 ENCOUNTER — HOSPITAL ENCOUNTER (OUTPATIENT)
Facility: HOSPITAL | Age: 85
Discharge: HOME | End: 2025-03-16
Attending: SPECIALIST
Payer: MEDICARE

## 2025-03-13 DIAGNOSIS — R15.1 FECAL SMEARING: ICD-10-CM

## 2025-03-13 DIAGNOSIS — K58.1 IRRITABLE BOWEL SYNDROME WITH CONSTIPATION: ICD-10-CM

## 2025-03-13 PROCEDURE — 72195 MRI PELVIS W/O DYE: CPT

## 2025-06-15 ENCOUNTER — HOSPITAL ENCOUNTER (INPATIENT)
Facility: HOSPITAL | Age: 85
LOS: 2 days | Discharge: HOME | DRG: 312 | End: 2025-06-17
Attending: EMERGENCY MEDICINE | Admitting: INTERNAL MEDICINE
Payer: MEDICARE

## 2025-06-15 ENCOUNTER — APPOINTMENT (OUTPATIENT)
Facility: HOSPITAL | Age: 85
DRG: 312 | End: 2025-06-15
Payer: MEDICARE

## 2025-06-15 DIAGNOSIS — R55 SYNCOPE, UNSPECIFIED SYNCOPE TYPE: ICD-10-CM

## 2025-06-15 DIAGNOSIS — R55 SYNCOPE AND COLLAPSE: Primary | ICD-10-CM

## 2025-06-15 LAB
ALBUMIN SERPL-MCNC: 3.6 G/DL (ref 3.5–5)
ALBUMIN/GLOB SERPL: 1.1 (ref 1.1–2.2)
ALP SERPL-CCNC: 50 U/L (ref 45–117)
ALT SERPL-CCNC: 19 U/L (ref 12–78)
ANION GAP SERPL CALC-SCNC: 8 MMOL/L (ref 2–12)
APPEARANCE UR: CLEAR
AST SERPL-CCNC: 11 U/L (ref 15–37)
BACTERIA URNS QL MICRO: NEGATIVE /HPF
BASOPHILS # BLD: 0.03 K/UL (ref 0–0.1)
BASOPHILS NFR BLD: 0.2 % (ref 0–1)
BILIRUB SERPL-MCNC: 0.4 MG/DL (ref 0.2–1)
BILIRUB UR QL: NEGATIVE
BUN SERPL-MCNC: 25 MG/DL (ref 6–20)
BUN/CREAT SERPL: 17 (ref 12–20)
CALCIUM SERPL-MCNC: 8.7 MG/DL (ref 8.5–10.1)
CHLORIDE SERPL-SCNC: 102 MMOL/L (ref 97–108)
CO2 SERPL-SCNC: 24 MMOL/L (ref 21–32)
COLOR UR: ABNORMAL
CREAT SERPL-MCNC: 1.49 MG/DL (ref 0.55–1.02)
DIFFERENTIAL METHOD BLD: ABNORMAL
EKG ATRIAL RATE: 60 BPM
EKG DIAGNOSIS: NORMAL
EKG P AXIS: 46 DEGREES
EKG P-R INTERVAL: 160 MS
EKG Q-T INTERVAL: 416 MS
EKG QRS DURATION: 84 MS
EKG QTC CALCULATION (BAZETT): 416 MS
EKG R AXIS: 53 DEGREES
EKG T AXIS: 61 DEGREES
EKG VENTRICULAR RATE: 60 BPM
EOSINOPHIL # BLD: 0.09 K/UL (ref 0–0.4)
EOSINOPHIL NFR BLD: 0.7 % (ref 0–7)
EPITH CASTS URNS QL MICRO: ABNORMAL /LPF
ERYTHROCYTE [DISTWIDTH] IN BLOOD BY AUTOMATED COUNT: 16 % (ref 11.5–14.5)
GLOBULIN SER CALC-MCNC: 3.2 G/DL (ref 2–4)
GLUCOSE BLD STRIP.AUTO-MCNC: 131 MG/DL (ref 65–117)
GLUCOSE SERPL-MCNC: 112 MG/DL (ref 65–100)
GLUCOSE UR STRIP.AUTO-MCNC: NEGATIVE MG/DL
HCT VFR BLD AUTO: 36.3 % (ref 35–47)
HGB BLD-MCNC: 11.7 G/DL (ref 11.5–16)
HGB UR QL STRIP: NEGATIVE
HYALINE CASTS URNS QL MICRO: ABNORMAL /LPF (ref 0–2)
IMM GRANULOCYTES # BLD AUTO: 0.15 K/UL (ref 0–0.04)
IMM GRANULOCYTES NFR BLD AUTO: 1.2 % (ref 0–0.5)
KETONES UR QL STRIP.AUTO: NEGATIVE MG/DL
LEUKOCYTE ESTERASE UR QL STRIP.AUTO: ABNORMAL
LYMPHOCYTES # BLD: 2.32 K/UL (ref 0.8–3.5)
LYMPHOCYTES NFR BLD: 18 % (ref 12–49)
MAGNESIUM SERPL-MCNC: 2.1 MG/DL (ref 1.6–2.4)
MCH RBC QN AUTO: 28.5 PG (ref 26–34)
MCHC RBC AUTO-ENTMCNC: 32.2 G/DL (ref 30–36.5)
MCV RBC AUTO: 88.3 FL (ref 80–99)
MONOCYTES # BLD: 1.52 K/UL (ref 0–1)
MONOCYTES NFR BLD: 11.8 % (ref 5–13)
NEUTS SEG # BLD: 8.75 K/UL (ref 1.8–8)
NEUTS SEG NFR BLD: 68.1 % (ref 32–75)
NITRITE UR QL STRIP.AUTO: NEGATIVE
NRBC # BLD: 0 K/UL (ref 0–0.01)
NRBC BLD-RTO: 0 PER 100 WBC
NT PRO BNP: 125 PG/ML
NT PRO BNP: 186 PG/ML
PH UR STRIP: 6.5 (ref 5–8)
PLATELET # BLD AUTO: 276 K/UL (ref 150–400)
PMV BLD AUTO: 11.1 FL (ref 8.9–12.9)
POTASSIUM SERPL-SCNC: 4 MMOL/L (ref 3.5–5.1)
PROT SERPL-MCNC: 6.8 G/DL (ref 6.4–8.2)
PROT UR STRIP-MCNC: NEGATIVE MG/DL
RBC # BLD AUTO: 4.11 M/UL (ref 3.8–5.2)
RBC #/AREA URNS HPF: ABNORMAL /HPF (ref 0–5)
SERVICE CMNT-IMP: ABNORMAL
SODIUM SERPL-SCNC: 134 MMOL/L (ref 136–145)
SP GR UR REFRACTOMETRY: 1.02
TROPONIN I SERPL HS-MCNC: 10 NG/L (ref 0–51)
TROPONIN I SERPL HS-MCNC: 11 NG/L (ref 0–51)
TROPONIN I SERPL HS-MCNC: 7 NG/L (ref 0–51)
TROPONIN I SERPL HS-MCNC: 8 NG/L (ref 0–51)
URINE CULTURE IF INDICATED: ABNORMAL
UROBILINOGEN UR QL STRIP.AUTO: 0.2 EU/DL (ref 0.2–1)
WBC # BLD AUTO: 12.9 K/UL (ref 3.6–11)
WBC URNS QL MICRO: ABNORMAL /HPF (ref 0–4)

## 2025-06-15 PROCEDURE — 93005 ELECTROCARDIOGRAM TRACING: CPT | Performed by: EMERGENCY MEDICINE

## 2025-06-15 PROCEDURE — 84484 ASSAY OF TROPONIN QUANT: CPT

## 2025-06-15 PROCEDURE — 85025 COMPLETE CBC W/AUTO DIFF WBC: CPT

## 2025-06-15 PROCEDURE — 71045 X-RAY EXAM CHEST 1 VIEW: CPT

## 2025-06-15 PROCEDURE — 83735 ASSAY OF MAGNESIUM: CPT

## 2025-06-15 PROCEDURE — 99285 EMERGENCY DEPT VISIT HI MDM: CPT

## 2025-06-15 PROCEDURE — 1100000003 HC PRIVATE W/ TELEMETRY

## 2025-06-15 PROCEDURE — 80053 COMPREHEN METABOLIC PANEL: CPT

## 2025-06-15 PROCEDURE — 2500000003 HC RX 250 WO HCPCS: Performed by: INTERNAL MEDICINE

## 2025-06-15 PROCEDURE — 2580000003 HC RX 258

## 2025-06-15 PROCEDURE — 83880 ASSAY OF NATRIURETIC PEPTIDE: CPT

## 2025-06-15 PROCEDURE — 70450 CT HEAD/BRAIN W/O DYE: CPT

## 2025-06-15 PROCEDURE — 36415 COLL VENOUS BLD VENIPUNCTURE: CPT

## 2025-06-15 PROCEDURE — 6360000002 HC RX W HCPCS: Performed by: INTERNAL MEDICINE

## 2025-06-15 PROCEDURE — 81001 URINALYSIS AUTO W/SCOPE: CPT

## 2025-06-15 PROCEDURE — 82962 GLUCOSE BLOOD TEST: CPT

## 2025-06-15 PROCEDURE — 6360000002 HC RX W HCPCS

## 2025-06-15 RX ORDER — SODIUM CHLORIDE 0.9 % (FLUSH) 0.9 %
5-40 SYRINGE (ML) INJECTION EVERY 12 HOURS SCHEDULED
Status: DISCONTINUED | OUTPATIENT
Start: 2025-06-15 | End: 2025-06-17 | Stop reason: HOSPADM

## 2025-06-15 RX ORDER — SODIUM CHLORIDE 9 MG/ML
INJECTION, SOLUTION INTRAVENOUS PRN
Status: DISCONTINUED | OUTPATIENT
Start: 2025-06-15 | End: 2025-06-15

## 2025-06-15 RX ORDER — ONDANSETRON 4 MG/1
4 TABLET, ORALLY DISINTEGRATING ORAL EVERY 8 HOURS PRN
Status: DISCONTINUED | OUTPATIENT
Start: 2025-06-15 | End: 2025-06-17 | Stop reason: HOSPADM

## 2025-06-15 RX ORDER — SODIUM CHLORIDE 0.9 % (FLUSH) 0.9 %
5-40 SYRINGE (ML) INJECTION PRN
Status: DISCONTINUED | OUTPATIENT
Start: 2025-06-15 | End: 2025-06-17 | Stop reason: HOSPADM

## 2025-06-15 RX ORDER — ACETAMINOPHEN 650 MG/1
650 SUPPOSITORY RECTAL EVERY 6 HOURS PRN
Status: DISCONTINUED | OUTPATIENT
Start: 2025-06-15 | End: 2025-06-17 | Stop reason: HOSPADM

## 2025-06-15 RX ORDER — HYDRALAZINE HYDROCHLORIDE 20 MG/ML
10 INJECTION INTRAMUSCULAR; INTRAVENOUS EVERY 6 HOURS PRN
Status: DISCONTINUED | OUTPATIENT
Start: 2025-06-15 | End: 2025-06-17 | Stop reason: HOSPADM

## 2025-06-15 RX ORDER — SODIUM CHLORIDE, SODIUM LACTATE, POTASSIUM CHLORIDE, AND CALCIUM CHLORIDE .6; .31; .03; .02 G/100ML; G/100ML; G/100ML; G/100ML
500 INJECTION, SOLUTION INTRAVENOUS
Status: COMPLETED | OUTPATIENT
Start: 2025-06-15 | End: 2025-06-15

## 2025-06-15 RX ORDER — ONDANSETRON 2 MG/ML
4 INJECTION INTRAMUSCULAR; INTRAVENOUS EVERY 6 HOURS PRN
Status: DISCONTINUED | OUTPATIENT
Start: 2025-06-15 | End: 2025-06-17 | Stop reason: HOSPADM

## 2025-06-15 RX ORDER — ENOXAPARIN SODIUM 100 MG/ML
30 INJECTION SUBCUTANEOUS DAILY
Status: DISCONTINUED | OUTPATIENT
Start: 2025-06-15 | End: 2025-06-17 | Stop reason: HOSPADM

## 2025-06-15 RX ORDER — POLYETHYLENE GLYCOL 3350 17 G/17G
17 POWDER, FOR SOLUTION ORAL DAILY PRN
Status: DISCONTINUED | OUTPATIENT
Start: 2025-06-15 | End: 2025-06-17 | Stop reason: HOSPADM

## 2025-06-15 RX ORDER — SODIUM CHLORIDE 9 MG/ML
INJECTION, SOLUTION INTRAVENOUS PRN
Status: DISCONTINUED | OUTPATIENT
Start: 2025-06-15 | End: 2025-06-17 | Stop reason: HOSPADM

## 2025-06-15 RX ORDER — ACETAMINOPHEN 325 MG/1
650 TABLET ORAL EVERY 6 HOURS PRN
Status: DISCONTINUED | OUTPATIENT
Start: 2025-06-15 | End: 2025-06-17 | Stop reason: HOSPADM

## 2025-06-15 RX ADMIN — HYDRALAZINE HYDROCHLORIDE 10 MG: 20 INJECTION INTRAMUSCULAR; INTRAVENOUS at 21:12

## 2025-06-15 RX ADMIN — ENOXAPARIN SODIUM 30 MG: 100 INJECTION SUBCUTANEOUS at 21:10

## 2025-06-15 RX ADMIN — SODIUM CHLORIDE, SODIUM LACTATE, POTASSIUM CHLORIDE, AND CALCIUM CHLORIDE 500 ML: .6; .31; .03; .02 INJECTION, SOLUTION INTRAVENOUS at 17:14

## 2025-06-15 RX ADMIN — SODIUM CHLORIDE, PRESERVATIVE FREE 10 ML: 5 INJECTION INTRAVENOUS at 21:10

## 2025-06-15 ASSESSMENT — PAIN SCALES - GENERAL
PAINLEVEL_OUTOF10: 0
PAINLEVEL_OUTOF10: 5

## 2025-06-15 ASSESSMENT — PAIN - FUNCTIONAL ASSESSMENT: PAIN_FUNCTIONAL_ASSESSMENT: 0-10

## 2025-06-15 ASSESSMENT — PAIN DESCRIPTION - DESCRIPTORS: DESCRIPTORS: ACHING

## 2025-06-15 ASSESSMENT — PAIN DESCRIPTION - LOCATION: LOCATION: HEAD

## 2025-06-15 ASSESSMENT — PAIN DESCRIPTION - ORIENTATION: ORIENTATION: ANTERIOR

## 2025-06-15 NOTE — CONSENT
Informed Consent for Blood Component Transfusion Note    I have discussed with the patient the rationale for blood component transfusion; its benefits in treating or preventing fatigue, organ damage, or death; and its risk which includes mild transfusion reactions, rare risk of blood borne infection, or more serious but rare reactions. I have discussed the alternatives to transfusion, including the risk and consequences of not receiving transfusion. The patient had an opportunity to ask questions and had agreed to proceed with transfusion of blood components.    Electronically signed by Sally Bradley DO on 6/15/25 at 4:49 PM EDT

## 2025-06-15 NOTE — ED PROVIDER NOTES
[facility] EMERGENCY DEPARTMENT  EMERGENCY DEPARTMENT ENCOUNTER       Pt Name: Bandar Montano  MRN: [id number]  Birthdate 1940  Date of evaluation: 6/15/2025  Provider: Sally Bradley DO   PCP: Hayden Lopez MD  Note Started: 4:04 PM EDT 6/15/25  Attending Physician: Dr. Yadira MD    CHIEF COMPLAINT       Chief Complaint   Patient presents with    Loss of Consciousness        HISTORY OF PRESENT ILLNESS: 1 or more elements      History From: pt, daughter at bedside History limited by: none     Bandar Montano is a 85 y.o. female with pmhx of HTN, spinal stenosis, presenting to the ED today after a syncopal episode.  Per patient family report, she was at a family gathering outside, states that she had felt like she had overdid and was feeling slightly tired, then patient had single episode falling face first onto the table while seated.  Patient does not remember the accident, however family does not report to have any tremors or any seizure-like activity. Pt here in the ED does not have any complaints at this time, no chest pain, Sob,no headache, dizziness, no nausea, vomiting, abd pain.  Patient states that she has been urinating slightly more than normal amount, she does have chronic urinary incontinence not new for her, she currently is getting evaluated for pelvic floor PT.  Denies any recent illnesses, fevers, chills, reports decent p.o. intake.  She does report that she was told to watch her diastolic blood pressure by her primary care doctor.  Reports that it has been running mildly low, 50s. Pt has been compliant with her medications.        Please See MDM for Additional Details of the HPI/PMH  Nursing Notes were all reviewed and agreed with or any disagreements were addressed in the HPI.     REVIEW OF SYSTEMS        Positives and Pertinent negatives as per HPI.    PAST HISTORY     Past Medical History:  Past Medical History:   Diagnosis Date    Arrhythmia     ?IRREGULAR HEARTBEAT

## 2025-06-15 NOTE — ED NOTES
SBAR telephone report given to Francisca LR. Opportunity for questions and concerns have been provided. Receiving RN aware that meds due In MAR still need to be collected. Receiving RN also aware of scheduled troponin level orders.

## 2025-06-15 NOTE — ED PROVIDER NOTES
11:28 PM    Chief Complaint   Patient presents with    Loss of Consciousness     Patient ambulatory to triage c/o syncopal episode witnessed by family prior to arrival. Patient does not remember anything except for getting to the couch. Patient alert and oriented x4 att and only complaining of a slight headache. Pt family states pt passed out after eating lunch at a table. No seizure like activity , episode lasted less than 2 minutes       The patient presents with:  syncope.  85-year-old female with past medical history of hypertension and chronic back pain presents emergency department after syncopal episode.  Patient reports she was in her normal state of health.  Family reports she sat down to eat dinner outside reports it was slightly warm out.  Patient began to yawn and then stated she thought she \"ate too much.\"  She had a syncopal episode where she was unresponsive for approximately 5 minutes.  No seizure-like activity noted.  No postictal period.    No history of this.    My exam shows: 85Y female, resting in stretcher, no acute distress.  Clear to auscultation bilaterally.  There is regular rate and rhythm with a 2 out of 6 systolic ejection murmur.    Impression/Plan: 85 old female presents emerged department after witnessed syncopal episode.  Vitals are remarkable.  Does have mild systolic ejection murmur on exam.  Twelve-lead EKG without WPW, Brugada or prolonged QTc.  Considerations include vasovagal or structural causes.  Although patient has a murmur will be unusual for aortic stenosis.  Doubt seizure.    Nursing note and vitals reviewed.    MEDICATIONS GIVEN:  Medications   sodium chloride flush 0.9 % injection 5-40 mL (10 mLs IntraVENous Given 6/15/25 2110)   sodium chloride flush 0.9 % injection 5-40 mL (has no administration in time range)   0.9 % sodium chloride infusion (has no administration in time range)   enoxaparin Sodium (LOVENOX) injection 30 mg (30 mg SubCUTAneous Given 6/15/25 2110)    ondansetron (ZOFRAN-ODT) disintegrating tablet 4 mg (has no administration in time range)     Or   ondansetron (ZOFRAN) injection 4 mg (has no administration in time range)   polyethylene glycol (GLYCOLAX) packet 17 g (has no administration in time range)   acetaminophen (TYLENOL) tablet 650 mg (has no administration in time range)     Or   acetaminophen (TYLENOL) suppository 650 mg (has no administration in time range)   hydrALAZINE (APRESOLINE) injection 10 mg (10 mg IntraVENous Given 6/15/25 2112)   lactated ringers bolus 500 mL (0 mLs IntraVENous Stopped 6/15/25 1833)       ED Course as of 06/15/25 2328   Sun Lew 15, 2025   1611 EKG read: Normal sinus rhythm, normal intervals, no repolarization abnormalities [SP]   1706 Troponin, High Sensitivity: 7 [BD]   1707 XR CHEST 1 VIEW  IMPRESSION:  1. Left basilar atelectasis   [BD]   1735 UA without signs of UTI [BD]   1735 Summary the patient has had any recent cardiac workup, no echo, in the setting of unprompted syncope, will plan to admit the patient for further workup including echocardiogram [BD]   1741 Perfect Serve Consult for Admission 6/15/25   5:44 PM EDT    Room # ED bed 10  Patient Name  Bandar Montano  Date of Birth 1940  Medical Record Number  [id number]   Age  85 y.o.  Working diagnosis: syncopal episode    Clinical summary:   85 y.o. yo female with pmhx of HTN, spinal stenosis, presents after syncopal episode at home, asymptomatic in ED but would like to admit for ECHO, syncope work up     Sepsis present:  No  Reassessment needed: No  Code Status:  Full Code  Readmission: No  Recommended Level of Care: med/surg  Zone Phone: [phone]    Requesting doctor/clinician: Sally Bradley DO  [BD]      ED Course User Index  [BD] Sally Bradley DO  [SP] Dani Hernandez MD     I personally saw and examined the patient. I have reviewed and agree with the resident’s findings, including all diagnostic interpretations, and plans as written. I was

## 2025-06-15 NOTE — H&P
Hospitalist Admission Note    NAME:   Bandar Montano   : 1940   MRN: [id number]     Date/Time: 6/15/2025 6:07 PM    Patient PCP: Hayden Lopez MD    ______________________________________________________________________  Given the patient's current clinical presentation, I have a high level of concern for decompensation if discharged from the emergency department.  Complex decision making was performed, which includes reviewing the patient's available past medical records, laboratory results, and x-ray films.       My assessment of this patient's clinical condition and my plan of care is as follows.    Assessment / Plan:      Syncope  Admit patient to medical telemetry  Follow-up head CT  Echocardiogram  Check orthostatic  Follow-up troponin  Follow-up TSH  Follow-up carotid duplex      Acute kidney injury    Start patient on IV fluid    Hyperlipidemia  Continue statin    Hypertension  Continue home antihypertensive medication      History of GERD  Continue PPI    Pharmacy consultation for home medication reconciliation    Medical Decision Making:   I personally reviewed labs: Yes  I personally reviewed imaging: Yes  I personally reviewed EKG:  Toxic drug monitoring:   Discussed case with: ED provider. After discussion I am in agreement that acuity of patient's medical condition necessitates hospital stay.      Code Status: Full  DVT Prophylaxis: Lovenox  Baseline:     Subjective:   CHIEF COMPLAINT: Syncope    HISTORY OF PRESENT ILLNESS:     Bandar Montano is a 85 y.o.  female with PMHx significant for hypertension, spinal stenosis, hyperlipidemia, history osteoarthritis presented to the hospital for evaluation of syncopal episode, was witnessed by his family, patient was outside with his family on suddenly collapsed falling on his face, patient lost consciousness, patient denies any chest pain any headache any shortness of breath, denies any seizure-like activity denies any urinary incontinence  or fecal incontinence.  We were asked to admit for work up and evaluation of the above problems.     Past Medical History:   Diagnosis Date    Arrhythmia     ?IRREGULAR HEARTBEAT    Arthritis     BACK    Colon polyps     Dr Marcelo    Diverticulitis     GERD (gastroesophageal reflux disease)     OCCASIONALLY    Hypertension     Ill-defined condition     stomach ulcer    Nausea & vomiting     Other ill-defined conditions(799.89)     DIVERTICULOSIS (HX: DIVERTICULITIS)    Other ill-defined conditions(799.89)     HI CHOLESTEROL    Psychiatric disorder     DEPRESSION        Past Surgical History:   Procedure Laterality Date    APPENDECTOMY      CATARACT REMOVAL      BILAT WITH IOL with bilateral lens implants    COLONOSCOPY N/A 11/13/2019    COLONOSCOPY performed by Demarco Urbina MD at [facility] ENDOSCOPY    COLONOSCOPY N/A 6/5/2024    COLONOSCOPY performed by Demarco Urbina MD at [facility] ENDOSCOPY    COLONOSCOPY FLX DX W/COLLJ SPEC WHEN PFRMD  6/25/2012         HYSTERECTOMY (CERVIX STATUS UNKNOWN)      KNEE ARTHROSCOPY      RIGHT KNEE     MOHS SURGERY      RIGHT SHOULDER--ROTATOR CUFF    ORTHOPEDIC SURGERY Right     shoulder - torn rotatory cuff and bone spur    ORTHOPEDIC SURGERY  6/2009    RIGHT TKR    OVARY REMOVAL      [place] UNLISTED PROCEDURE ABDOMEN PERITONEUM & OMENTUM      LYSIS OF ADHESIONS    TUBAL LIGATION      UPPER GASTROINTESTINAL ENDOSCOPY N/A 6/5/2024    ESOPHAGOGASTRODUODENOSCOPY performed by Demarco Urbina MD at [facility] ENDOSCOPY    UROLOGICAL SURGERY      bladder sling       Social History     Tobacco Use    Smoking status: Never    Smokeless tobacco: Never   Substance Use Topics    Alcohol use: Yes        Family History   Problem Relation Age of Onset    Post-op Nausea/Vomiting Sister     Breast Cancer Sister         over 50    Post-op Nausea/Vomiting Brother     Cancer Other         colon cancer       Allergies   Allergen Reactions    Nsaids Other (See Comments)     Pt has stomach ulcer    Codeine

## 2025-06-16 ENCOUNTER — APPOINTMENT (OUTPATIENT)
Facility: HOSPITAL | Age: 85
DRG: 312 | End: 2025-06-16
Attending: INTERNAL MEDICINE
Payer: MEDICARE

## 2025-06-16 PROBLEM — I65.23 BILATERAL CAROTID ARTERY STENOSIS: Status: ACTIVE | Noted: 2025-06-16

## 2025-06-16 PROBLEM — R55 SYNCOPE: Status: ACTIVE | Noted: 2025-06-16

## 2025-06-16 LAB
ECHO BSA: 1.81 M2
VAS LEFT CCA DIST EDV: 21.2 CM/S
VAS LEFT CCA DIST PSV: 74.2 CM/S
VAS LEFT CCA PROX EDV: 21 CM/S
VAS LEFT CCA PROX PSV: 108.1 CM/S
VAS LEFT ECA EDV: 8.9 CM/S
VAS LEFT ECA PSV: 83.8 CM/S
VAS LEFT ICA DIST EDV: 24.9 CM/S
VAS LEFT ICA DIST PSV: 94.2 CM/S
VAS LEFT ICA MID EDV: 30.9 CM/S
VAS LEFT ICA MID PSV: 89.3 CM/S
VAS LEFT ICA PROX EDV: 19.9 CM/S
VAS LEFT ICA PROX PSV: 47.3 CM/S
VAS LEFT ICA/CCA PSV: 1.3 NO UNITS
VAS LEFT SUBCLAVIAN PROX EDV: 0 CM/S
VAS LEFT SUBCLAVIAN PROX PSV: 118.3 CM/S
VAS LEFT VERTEBRAL EDV: 19.2 CM/S
VAS LEFT VERTEBRAL PSV: 75.6 CM/S
VAS RIGHT CCA DIST EDV: 15.5 CM/S
VAS RIGHT CCA DIST PSV: 68.3 CM/S
VAS RIGHT CCA PROX EDV: 9.3 CM/S
VAS RIGHT CCA PROX PSV: 149.1 CM/S
VAS RIGHT ECA EDV: 8.9 CM/S
VAS RIGHT ECA PSV: 82 CM/S
VAS RIGHT ICA DIST EDV: 29.4 CM/S
VAS RIGHT ICA DIST PSV: 108.1 CM/S
VAS RIGHT ICA MID EDV: 36.4 CM/S
VAS RIGHT ICA MID PSV: 97.9 CM/S
VAS RIGHT ICA PROX EDV: 21.6 CM/S
VAS RIGHT ICA PROX PSV: 57.2 CM/S
VAS RIGHT ICA/CCA PSV: 1.6 NO UNITS
VAS RIGHT SUBCLAVIAN PROX EDV: 0 CM/S
VAS RIGHT SUBCLAVIAN PROX PSV: 175.9 CM/S
VAS RIGHT VERTEBRAL EDV: 21.6 CM/S
VAS RIGHT VERTEBRAL PSV: 69.5 CM/S

## 2025-06-16 PROCEDURE — 6370000000 HC RX 637 (ALT 250 FOR IP): Performed by: HOSPITALIST

## 2025-06-16 PROCEDURE — 1100000003 HC PRIVATE W/ TELEMETRY

## 2025-06-16 PROCEDURE — 93880 EXTRACRANIAL BILAT STUDY: CPT

## 2025-06-16 PROCEDURE — 6370000000 HC RX 637 (ALT 250 FOR IP): Performed by: INTERNAL MEDICINE

## 2025-06-16 PROCEDURE — 2500000003 HC RX 250 WO HCPCS: Performed by: INTERNAL MEDICINE

## 2025-06-16 PROCEDURE — 93880 EXTRACRANIAL BILAT STUDY: CPT | Performed by: PSYCHIATRY & NEUROLOGY

## 2025-06-16 PROCEDURE — 6360000002 HC RX W HCPCS: Performed by: INTERNAL MEDICINE

## 2025-06-16 PROCEDURE — 6360000002 HC RX W HCPCS

## 2025-06-16 RX ORDER — FAMOTIDINE 20 MG/1
20 TABLET, FILM COATED ORAL DAILY
Status: DISCONTINUED | OUTPATIENT
Start: 2025-06-16 | End: 2025-06-17 | Stop reason: HOSPADM

## 2025-06-16 RX ORDER — FAMOTIDINE 20 MG/1
20 TABLET, FILM COATED ORAL 2 TIMES DAILY
Status: DISCONTINUED | OUTPATIENT
Start: 2025-06-16 | End: 2025-06-16 | Stop reason: DRUGHIGH

## 2025-06-16 RX ORDER — CITALOPRAM HYDROBROMIDE 20 MG/1
10 TABLET ORAL DAILY
Status: DISCONTINUED | OUTPATIENT
Start: 2025-06-16 | End: 2025-06-17 | Stop reason: HOSPADM

## 2025-06-16 RX ORDER — AMLODIPINE BESYLATE 5 MG/1
2.5 TABLET ORAL DAILY
Status: DISCONTINUED | OUTPATIENT
Start: 2025-06-16 | End: 2025-06-17 | Stop reason: HOSPADM

## 2025-06-16 RX ORDER — ATORVASTATIN CALCIUM 10 MG/1
10 TABLET, FILM COATED ORAL DAILY
Status: DISCONTINUED | OUTPATIENT
Start: 2025-06-16 | End: 2025-06-17 | Stop reason: HOSPADM

## 2025-06-16 RX ORDER — DOXAZOSIN 2 MG/1
2 TABLET ORAL DAILY
Status: DISCONTINUED | OUTPATIENT
Start: 2025-06-16 | End: 2025-06-17 | Stop reason: HOSPADM

## 2025-06-16 RX ADMIN — CITALOPRAM HYDROBROMIDE 10 MG: 20 TABLET ORAL at 18:35

## 2025-06-16 RX ADMIN — SODIUM CHLORIDE, PRESERVATIVE FREE 10 ML: 5 INJECTION INTRAVENOUS at 09:44

## 2025-06-16 RX ADMIN — HYDRALAZINE HYDROCHLORIDE 10 MG: 20 INJECTION INTRAMUSCULAR; INTRAVENOUS at 09:45

## 2025-06-16 RX ADMIN — ENOXAPARIN SODIUM 30 MG: 100 INJECTION SUBCUTANEOUS at 09:44

## 2025-06-16 RX ADMIN — FAMOTIDINE 20 MG: 20 TABLET, FILM COATED ORAL at 21:03

## 2025-06-16 RX ADMIN — ACETAMINOPHEN 650 MG: 325 TABLET ORAL at 10:40

## 2025-06-16 RX ADMIN — ACETAMINOPHEN 650 MG: 325 TABLET ORAL at 18:34

## 2025-06-16 RX ADMIN — ATORVASTATIN CALCIUM 10 MG: 10 TABLET, FILM COATED ORAL at 18:34

## 2025-06-16 RX ADMIN — SODIUM CHLORIDE, PRESERVATIVE FREE 10 ML: 5 INJECTION INTRAVENOUS at 21:05

## 2025-06-16 ASSESSMENT — PAIN DESCRIPTION - ORIENTATION: ORIENTATION: ANTERIOR;LEFT;RIGHT

## 2025-06-16 ASSESSMENT — PAIN SCALES - GENERAL
PAINLEVEL_OUTOF10: 0
PAINLEVEL_OUTOF10: 3

## 2025-06-16 ASSESSMENT — PAIN DESCRIPTION - LOCATION: LOCATION: HEAD

## 2025-06-16 ASSESSMENT — PAIN DESCRIPTION - DESCRIPTORS: DESCRIPTORS: ACHING

## 2025-06-16 NOTE — PROGRESS NOTES
Re: Famotidine ([employer] approved dose change has been made on this patient)    Crcl: 26 ml/min  Ordered dose: 20 mg po BID  [facility] ordered dose change: 20 mg po daily for Crcl less than 50 ml/min    Thanks,  JULIA RUBALCAVA, [facility]

## 2025-06-16 NOTE — PROGRESS NOTES
Hospitalist Progress Note     Demographics    Patient Name  Bandar Montano   Date of Birth 1940   Medical Record Number  [id number]      Age  85 y.o.   PCP Hayden Lopez MD   Admit date:  6/15/2025  3:51 PM     Room Number  3208/01  @ [facility]           Admission Diagnoses:  Syncope and collapse        Assessment and plan:   #Syncopal episode  -Cause unclear, possibly representing vasovagal episode  - No recurrence after admission  -Echocardiogram pending  - Carotid duplex negative for significant stenosis    #Uncontrolled hypertension with hypertensive urgency  -Received IV and oral medications  -Blood pressure has come down today  -Slowly resuming home medications  -Losartan on hold for PRESLEY    #Acute kidney injury  -Likely related to high blood pressure  -Repeat BMP tomorrow    #Dyslipidemia  -Continue statin    #GERD  - Continue Pepcid      Body mass index is 25.01 kg/m².   Reference: BMI greater than 30 is classified as obesity and greater than 40 is classified as morbid obesity.     Anticipate discharge tomorrow       CODE STATUS   Full   Functional Status  At baseline   Surrogate decision maker:  Spouse   Prophylaxis   Lovenox   Discharge Plan:  Home w/Family,     Misc Inpatient  Payor: MEDICARE / Plan: MEDICARE PART A AND B / Product Type: *No Product type* /   No active isolations No active infections   Query   None noted today    Prognosis   Fair   Social comments  Supportive family   Medical Decision Making:    Relevant labs were reviewed:  Yes    Imaging report reviewed  Yes    EKG reviewed  NA   High risk/toxic drug monitoring  No    Care plan discussed with  Patient/Family  and Nurse      Subjective and Objective Data    Seen and examined at the bedside in the presence of multiple family members.  Reports no complaints today.  Blood pressure has significantly trended down today.  She denies dizziness, nausea or vomiting.      Comments       Patient Vitals for the  Phy-Adv  6/16/2025

## 2025-06-16 NOTE — PROGRESS NOTES
End of Shift Note    Bedside shift change report given to Paula MURPHY (oncoming nurse) by Hina Garcia RN (offgoing nurse).  Report included the following information SBAR, Intake/Output, MAR, Recent Results, and Cardiac Rhythm NSR    Shift worked:  NIGHT     Shift summary and any significant changes:    - Pt is a new admit from ED who came in at shift change. AO X 4, not in distress, accompanied with relatives.     -Her BP has been elevated, highest was 225/67, HR 50's - 60's. After multiple rechecks, bp came down to 185/54, HR: 62 , pt still asymptomatic. As per pt, her usual SBP at home is 150-160's.     - At 2018H I infomed NP [facility] reg pt elevated BP. Provider ordered PRN IV Hydralazine with parameters and to WOF for DBP.   -------------------------------------------  -Overall, pt had a calm night  -Standby assist to the bathroom  -Noted to have urinary frequency  -Troponin x3 were negative   -Labs drawn, neuro checks done as ordered  -Pending Echo, vascular duplex scan     Concerns for physician to address:   Elevated BP's      Zone phone for oncoming shift:           Activity:  Level of Assistance: Standby assist, set-up cues, supervision of patient - no hands on  Number times ambulated in hallways past shift: 0  Number of times OOB to chair past shift: 0    Cardiac:   Cardiac Monitoring: Yes      Cardiac Rhythm: Sinus rhythm    Access:  Current line(s): PIV     Genitourinary:        Respiratory:   O2 Device: None (Room air)  Chronic home O2 use?: NO  Incentive spirometer at bedside: NO    GI:  Last BM (including prior to admit): 06/15/25  Current diet:  ADULT DIET; Regular  Passing flatus: YES    Pain Management:   Patient states pain is manageable on current regimen: YES    Skin:  Tejinder Scale Score: 18  Interventions: Wound Offloading (Prevention Methods): Pillows, Repositioning, Turning, Elevate heels    Patient Safety:  Fall Risk: Nursing Judgement-Fall Risk High(Add Comments): Yes  Fall Risk

## 2025-06-16 NOTE — PROGRESS NOTES
End of Shift Note    Bedside shift change report given to BE Pcaker (oncoming nurse) by Paula Ferguson LPN (offgoing nurse).  Report included the following information SBAR    Shift worked:  0700 - 1930     Shift summary and any significant changes:     0931 -- Pt's BP this morning significantly elevated at 204/67,  RT arm, 214/69,  in LT arm. Pt has orders for Hydralazine 10mg IV and Amlodipine 2.5pm po. MD notified of BP, pt states she takes Losartan and Doxazosin as home meds so asked MD if he would like to change orders. Hydralazine given, see MAR. Losartan being held due to PRESLEY, Doxazosin ordered.     1026 -- In pt's room to recheck BP and administer po BP meds, BP now significantly lower than previous - 127/49, MAP 72. HR also elevated at 104, previously 51. MD notified, BP meds held per MD.     1615 -- Pt asking about home meds. BP trending back up. MD notified, meds ordered - see MAR.     Pt resting comfortably upon shift completion. All concerns addressed.    Concerns for physician to address:  BP fluctuations      Zone phone for oncoming shift:          Activity:  Level of Assistance: Standby assist, set-up cues, supervision of patient - no hands on  Number times ambulated in hallways past shift: 0  Number of times OOB to chair past shift: 0    Cardiac:   Cardiac Monitoring: Yes      Cardiac Rhythm: Sinus rhythm    Access:  Current line(s): PIV    Genitourinary:        Respiratory:   O2 Device: None (Room air)  Chronic home O2 use?: NO  Incentive spirometer at bedside: N/A    GI:  Last BM (including prior to admit): 06/15/25  Current diet:  ADULT DIET; Regular  Passing flatus: YES    Pain Management:   Patient states pain is manageable on current regimen: YES    Skin:  Tejinder Scale Score: 18  Interventions: Wound Offloading (Prevention Methods): Pillows, Repositioning, Turning    Patient Safety:  Fall Risk: Nursing Judgement-Fall Risk High(Add Comments): Yes  Fall Risk

## 2025-06-17 ENCOUNTER — APPOINTMENT (OUTPATIENT)
Facility: HOSPITAL | Age: 85
DRG: 312 | End: 2025-06-17
Attending: INTERNAL MEDICINE
Payer: MEDICARE

## 2025-06-17 VITALS
DIASTOLIC BLOOD PRESSURE: 67 MMHG | WEIGHT: 154.98 LBS | SYSTOLIC BLOOD PRESSURE: 150 MMHG | HEIGHT: 66 IN | TEMPERATURE: 98.4 F | HEART RATE: 70 BPM | OXYGEN SATURATION: 96 % | RESPIRATION RATE: 16 BRPM | BODY MASS INDEX: 24.91 KG/M2

## 2025-06-17 LAB
ANION GAP SERPL CALC-SCNC: 6 MMOL/L (ref 2–12)
BUN SERPL-MCNC: 21 MG/DL (ref 6–20)
BUN/CREAT SERPL: 18 (ref 12–20)
CALCIUM SERPL-MCNC: 9 MG/DL (ref 8.5–10.1)
CHLORIDE SERPL-SCNC: 106 MMOL/L (ref 97–108)
CO2 SERPL-SCNC: 25 MMOL/L (ref 21–32)
CREAT SERPL-MCNC: 1.17 MG/DL (ref 0.55–1.02)
ECHO AO ROOT DIAM: 3.2 CM
ECHO AO ROOT INDEX: 1.79 CM/M2
ECHO AV PEAK GRADIENT: 11 MMHG
ECHO AV PEAK VELOCITY: 1.7 M/S
ECHO AV VELOCITY RATIO: 0.82
ECHO BSA: 1.81 M2
ECHO LA DIAMETER INDEX: 1.56 CM/M2
ECHO LA DIAMETER: 2.8 CM
ECHO LA TO AORTIC ROOT RATIO: 0.88
ECHO LA VOL A-L A2C: 99 ML (ref 22–52)
ECHO LA VOL A-L A4C: 99 ML (ref 22–52)
ECHO LA VOL MOD A2C: 94 ML (ref 22–52)
ECHO LA VOL MOD A4C: 97 ML (ref 22–52)
ECHO LA VOLUME AREA LENGTH: 103 ML
ECHO LA VOLUME INDEX A-L A2C: 55 ML/M2 (ref 16–34)
ECHO LA VOLUME INDEX A-L A4C: 55 ML/M2 (ref 16–34)
ECHO LA VOLUME INDEX AREA LENGTH: 58 ML/M2 (ref 16–34)
ECHO LA VOLUME INDEX MOD A2C: 53 ML/M2 (ref 16–34)
ECHO LA VOLUME INDEX MOD A4C: 54 ML/M2 (ref 16–34)
ECHO LV E' LATERAL VELOCITY: 7.55 CM/S
ECHO LV E' SEPTAL VELOCITY: 5.89 CM/S
ECHO LV EF PHYSICIAN: 75 %
ECHO LV FRACTIONAL SHORTENING: 39 % (ref 28–44)
ECHO LV INTERNAL DIMENSION DIASTOLE INDEX: 2.57 CM/M2
ECHO LV INTERNAL DIMENSION DIASTOLIC: 4.6 CM (ref 3.9–5.3)
ECHO LV INTERNAL DIMENSION SYSTOLIC INDEX: 1.56 CM/M2
ECHO LV INTERNAL DIMENSION SYSTOLIC: 2.8 CM
ECHO LV IVSD: 1.1 CM (ref 0.6–0.9)
ECHO LV MASS 2D: 205 G (ref 67–162)
ECHO LV MASS INDEX 2D: 114.5 G/M2 (ref 43–95)
ECHO LV POSTERIOR WALL DIASTOLIC: 1.3 CM (ref 0.6–0.9)
ECHO LV RELATIVE WALL THICKNESS RATIO: 0.57
ECHO LVOT PEAK GRADIENT: 8 MMHG
ECHO LVOT PEAK VELOCITY: 1.4 M/S
ECHO MV A VELOCITY: 1.02 M/S
ECHO MV E DECELERATION TIME (DT): 258.3 MS
ECHO MV E VELOCITY: 0.68 M/S
ECHO MV E/A RATIO: 0.67
ECHO MV E/E' LATERAL: 9.01
ECHO MV E/E' RATIO (AVERAGED): 10.28
ECHO MV E/E' SEPTAL: 11.54
ECHO RV TAPSE: 1.7 CM (ref 1.7–?)
ECHO TV REGURGITANT MAX VELOCITY: 1.86 M/S
ECHO TV REGURGITANT PEAK GRADIENT: 15 MMHG
GLUCOSE SERPL-MCNC: 146 MG/DL (ref 65–100)
POTASSIUM SERPL-SCNC: 3.8 MMOL/L (ref 3.5–5.1)
SODIUM SERPL-SCNC: 137 MMOL/L (ref 136–145)

## 2025-06-17 PROCEDURE — 93306 TTE W/DOPPLER COMPLETE: CPT

## 2025-06-17 PROCEDURE — 6370000000 HC RX 637 (ALT 250 FOR IP): Performed by: INTERNAL MEDICINE

## 2025-06-17 PROCEDURE — 36415 COLL VENOUS BLD VENIPUNCTURE: CPT

## 2025-06-17 PROCEDURE — 6370000000 HC RX 637 (ALT 250 FOR IP): Performed by: HOSPITALIST

## 2025-06-17 PROCEDURE — 6360000002 HC RX W HCPCS: Performed by: INTERNAL MEDICINE

## 2025-06-17 PROCEDURE — 6360000002 HC RX W HCPCS

## 2025-06-17 PROCEDURE — 80048 BASIC METABOLIC PNL TOTAL CA: CPT

## 2025-06-17 PROCEDURE — 2500000003 HC RX 250 WO HCPCS: Performed by: INTERNAL MEDICINE

## 2025-06-17 RX ADMIN — ENOXAPARIN SODIUM 30 MG: 100 INJECTION SUBCUTANEOUS at 08:01

## 2025-06-17 RX ADMIN — ATORVASTATIN CALCIUM 10 MG: 10 TABLET, FILM COATED ORAL at 08:03

## 2025-06-17 RX ADMIN — ACETAMINOPHEN 650 MG: 325 TABLET ORAL at 09:52

## 2025-06-17 RX ADMIN — FAMOTIDINE 20 MG: 20 TABLET, FILM COATED ORAL at 08:02

## 2025-06-17 RX ADMIN — CITALOPRAM HYDROBROMIDE 10 MG: 20 TABLET ORAL at 08:03

## 2025-06-17 RX ADMIN — HYDRALAZINE HYDROCHLORIDE 10 MG: 20 INJECTION INTRAMUSCULAR; INTRAVENOUS at 08:06

## 2025-06-17 RX ADMIN — AMLODIPINE BESYLATE 2.5 MG: 5 TABLET ORAL at 08:02

## 2025-06-17 RX ADMIN — DOXAZOSIN 2 MG: 2 TABLET ORAL at 08:02

## 2025-06-17 RX ADMIN — SODIUM CHLORIDE, PRESERVATIVE FREE 10 ML: 5 INJECTION INTRAVENOUS at 08:11

## 2025-06-17 ASSESSMENT — PAIN SCALES - GENERAL: PAINLEVEL_OUTOF10: 3

## 2025-06-17 NOTE — PROGRESS NOTES
End of Shift Note    Bedside shift change report given to *** (oncoming nurse) by Jennifer Polk LPN (offgoing nurse).  Report included the following information SBAR and MAR    Shift worked:  [id number]     Shift summary and any significant changes:     Tylenol given x1 for headache. PRN hydralazine given for /75. Meds given per MAR. Plan of care ongoing.      Concerns for physician to address:  None     Zone phone for oncoming shift:   [phone]       Jennifer Polk LPN

## 2025-06-17 NOTE — PROGRESS NOTES
Patient discharged with daughter via wheelchair, IV removed, AVS reviewed, and all belongings with patient.

## 2025-06-17 NOTE — PROGRESS NOTES
End of Shift Note    Bedside shift change report given to Jennifer MURPHY (oncoming nurse) by Ochoa Longo RN (offgoing nurse).  Report included the following information SBAR, Intake/Output, MAR, Recent Results, Cardiac Rhythm Sinus Hemant, and Alarm Parameters     Shift worked:  Night     Shift summary and any significant changes:    Patient slept fairly.  No complaints during shift.  Noted BP to be elevated, for attending to address. Caring rounds done, call bell within reach.  Echo pending.  Plan of care ongoing.     Concerns for physician to address:  BP     Zone phone for oncoming shift:          Activity:  Level of Assistance: Standby assist, set-up cues, supervision of patient - no hands on  Number times ambulated in hallways past shift: 0  Number of times OOB to chair past shift: 1    Cardiac:   Cardiac Monitoring: Yes      Cardiac Rhythm: Sinus rhythm    Access:  Current line(s): PIV     Genitourinary:        Respiratory:   O2 Device: None (Room air)  Chronic home O2 use?: NO  Incentive spirometer at bedside: NO    GI:  Last BM (including prior to admit): 06/15/25  Current diet:  ADULT DIET; Regular  Passing flatus: YES    Pain Management:   Patient states pain is manageable on current regimen: N/A    Skin:  Tejinder Scale Score: 18  Interventions: Wound Offloading (Prevention Methods): Bed, pressure reduction mattress, Repositioning, Pillows    Patient Safety:  Fall Risk: Nursing Judgement-Fall Risk High(Add Comments): Yes  Fall Risk Interventions  Nursing Judgement-Fall Risk High(Add Comments): Yes  Toilet Every 2 Hours-In Advance of Need: Yes  Hourly Visual Checks: Awake, In bed  Fall Visual Posted: Socks, Fall sign posted  Room Door Open: Deferred to promote rest  Alarm On: Bed  Patient Moved Closer to Nursing Station: No    Active Consults:   IP CONSULT TO PHARMACY    Length of Stay:  Expected LOS: 2  Actual LOS: 2    Ochoa Longo RN

## 2025-06-17 NOTE — DISCHARGE SUMMARY
Physician Discharge Summary     Pt Name  Bandar Montano   Admit date:  6/15/2025   Discharge date and time:  6/17/2025    Room Number  3208/01    [facility]    Medical Record Number  [id number]    Age  85 y.o.   Date of Birth 1940   PCP Hayden Lopez MD   Admission Diagnoses: Syncope and collapse   Present on Admission:   Syncope and collapse   Bilateral carotid artery stenosis   Syncope     Allergies   Allergen Reactions    Nsaids Other (See Comments)     Pt has stomach ulcer    Codeine Nausea And Vomiting         Excerpt from HPI :   \"Bandar Montano is a 85 y.o.  female with PMHx significant for hypertension, spinal stenosis, hyperlipidemia, history osteoarthritis presented to the hospital for evaluation of syncopal episode, was witnessed by his family, patient was outside with his family on suddenly collapsed falling on his face, patient lost consciousness, patient denies any chest pain any headache any shortness of breath, denies any seizure-like activity denies any urinary incontinence or fecal incontinence.  We were asked to admit for work up and evaluation of the above problems. \"        Hospital Course:   Patient was closely monitored on telemetry while being treated for the following problems syncopal episode.  There was no recurrence of syncope after admission.  No evidence of orthostatic hypotension on vital sign monitoring.  Noted to have significant fluctuation in her blood pressure with episodes of very high, as high as low 200 systolic.  She was also noted to have evidence of PRESLEY.  Blood pressure was slowly brought under control using IV and oral medications.  Echocardiogram showed preserved EF 70-75% without evidence of significant valvular abnormalities.  Patient was discharged home today in hemodynamically stable condition.  She was advised to continue her usual medications for blood pressure as her comorbidities.    #Syncopal episode  -Cause unclear,  these medications      Calcium Carb-Cholecalciferol 600-20 MG-MCG Chew     citalopram 20 MG tablet  Commonly known as: CELEXA     famotidine 20 MG tablet  Commonly known as: PEPCID  Take 1 tablet by mouth 2 times daily     gabapentin 100 MG capsule  Commonly known as: NEURONTIN  Take 1 capsule by mouth 2 times daily for 30 days. Intended supply: 30 days Max Daily Amount: 200 mg     losartan 50 MG tablet  Commonly known as: COZAAR     lovastatin 20 MG tablet  Commonly known as: MEVACOR     magnesium oxide 400 (240 Mg) MG tablet  Commonly known as: MAG-OX     pantoprazole 40 MG tablet  Commonly known as: PROTONIX  Take 1 tablet by mouth 2 times daily (before meals)     senna-docusate 8.6-50 MG per tablet  Commonly known as: PERICOLACE     Timolol 0.5 % Soln ophthalmic solution  Commonly known as: TIMOPTIC     tiZANidine 2 MG tablet  Commonly known as: ZANAFLEX                 Significant Diagnostic Studies:   Recent Labs     06/15/25  1609   WBC 12.9*   HGB 11.7   HCT 36.3        Recent Labs     06/15/25  1609 06/17/25  1255   * 137   K 4.0 3.8    106   CO2 24 25   BUN 25* 21*   MG 2.1  --    ALT 19  --      No results found for: \"TSH\"       Other medical conditions are listed in the active hospital problem list section; these and other pertinent data were taken into consideration when the treatment plan was developed and customized to this patient's unique overall circumstances and needs.    High complexity decision making was performed for this patient who is at high risk for decompensation with multiple organ involvement.     Today total floor/unit time was 36 minutes while caring for this patient and greater than 50% of that time was spent with patient (and/or family) coordinating patient's clinical issues.     Ulises Doherty MD   6/17/2025

## 2025-06-17 NOTE — PLAN OF CARE
Problem: Discharge Planning  Goal: Discharge to home or other facility with appropriate resources  6/16/2025 2310 by Ochoa Longo RN  Outcome: Progressing  6/16/2025 1703 by Paula Ferguson LPN  Outcome: Progressing     Problem: Pain  Goal: Verbalizes/displays adequate comfort level or baseline comfort level  6/16/2025 2310 by Ochoa Longo RN  Outcome: Progressing  6/16/2025 1703 by Paula Ferguson LPN  Outcome: Progressing     Problem: Skin/Tissue Integrity  Goal: Skin integrity remains intact  Description: 1.  Monitor for areas of redness and/or skin breakdown  2.  Assess vascular access sites hourly  3.  Every 4-6 hours minimum:  Change oxygen saturation probe site  4.  Every 4-6 hours:  If on nasal continuous positive airway pressure, respiratory therapy assess nares and determine need for appliance change or resting period  6/16/2025 2310 by Ochoa Longo RN  Outcome: Progressing  6/16/2025 1703 by Paula Ferguson LPN  Outcome: Progressing     Problem: Safety - Adult  Goal: Free from fall injury  6/16/2025 2310 by Ochoa Longo RN  Outcome: Progressing  6/16/2025 1703 by Paula Ferguson LPN  Outcome: Progressing

## 2025-06-17 NOTE — PROGRESS NOTES
PCP hospital follow-up transitional care appointment has been scheduled with JUSTO Carl on 6/26/25 1300. PCP scheduling advised CM that this patient typically visits the [facility] location. The PCP appt is scheduled with the [place] location as indicated on the AVS. Pending patient discharge.

## 2025-06-17 NOTE — CARE COORDINATION
Care Management Initial Assessment       RUR: 15% Moderate Risk  Readmission? No  1st IM letter given? Yes   1st  letter given: No    Initial Assessment: Chart reviewed. CM met with pt at the bedside to introduce self and role. Verified contact information and demographics. Pt resides alone in a one level home with 2 ADAL. Pt PCP is Dr. Lopez with last visit being within the last 6 months. Preferred pharmacy is [email or web address] in [place], [place]. Pt has no hx needing IPR/SNF intervention. Pt states she has used HH services in the past however does not remember the name of agency. Pt state she is independent  with ADL's and is an active . ?Pt has access to a walker if needed. Pts daughter will transport for discharge.?She?states there are no concerns for her to return home. CM will continue to follow.   Plan A: Home with follow up appts.  Plan B: Home with HH  Full assessment below:         06/17/25 1005   Service Assessment   Patient Orientation Alert and Oriented;Place;Person;Situation;Self   Cognition Alert   History Provided By Patient   Primary Caregiver Self   Support Systems Spouse/Significant Other;Children   Patient's Healthcare Decision Maker is: Legal Next of Kin   PCP Verified by CM Yes   Last Visit to PCP Within last 6 months   Prior Functional Level Independent in ADLs/IADLs   Current Functional Level Independent in ADLs/IADLs   Can patient return to prior living arrangement Yes   Ability to make needs known: Good   Family able to assist with home care needs: Yes   Would you like for me to discuss the discharge plan with any other family members/significant others, and if so, who? No   Financial Resources Medicare   Social/Functional History   Lives With Spouse   Type of Home House   Home Layout One level   Home Equipment Walker - Standard   Active  Yes   Discharge Planning   Type of Residence House   Current Services Prior To Admission None   Patient expects to be discharged to: House

## 2025-07-11 ENCOUNTER — APPOINTMENT (OUTPATIENT)
Facility: HOSPITAL | Age: 85
DRG: 305 | End: 2025-07-11
Payer: MEDICARE

## 2025-07-11 ENCOUNTER — HOSPITAL ENCOUNTER (INPATIENT)
Facility: HOSPITAL | Age: 85
LOS: 2 days | Discharge: HOME HEALTH | DRG: 305 | End: 2025-07-13
Attending: STUDENT IN AN ORGANIZED HEALTH CARE EDUCATION/TRAINING PROGRAM | Admitting: STUDENT IN AN ORGANIZED HEALTH CARE EDUCATION/TRAINING PROGRAM
Payer: MEDICARE

## 2025-07-11 DIAGNOSIS — N17.9 AKI (ACUTE KIDNEY INJURY): ICD-10-CM

## 2025-07-11 DIAGNOSIS — R41.82 ALTERED MENTAL STATUS, UNSPECIFIED ALTERED MENTAL STATUS TYPE: ICD-10-CM

## 2025-07-11 DIAGNOSIS — I67.4 HYPERTENSIVE ENCEPHALOPATHY: Primary | ICD-10-CM

## 2025-07-11 DIAGNOSIS — G44.209 TENSION HEADACHE: ICD-10-CM

## 2025-07-11 PROBLEM — I16.9 HYPERTENSIVE CRISIS: Status: ACTIVE | Noted: 2025-07-11

## 2025-07-11 LAB
ALBUMIN SERPL-MCNC: 3.9 G/DL (ref 3.5–5)
ALBUMIN/GLOB SERPL: 1.1 (ref 1.1–2.2)
ALP SERPL-CCNC: 50 U/L (ref 45–117)
ALT SERPL-CCNC: 21 U/L (ref 12–78)
ANION GAP SERPL CALC-SCNC: 3 MMOL/L (ref 2–12)
APPEARANCE UR: CLEAR
AST SERPL-CCNC: 17 U/L (ref 15–37)
BACTERIA URNS QL MICRO: NEGATIVE /HPF
BASOPHILS # BLD: 0.03 K/UL (ref 0–0.1)
BASOPHILS NFR BLD: 0.3 % (ref 0–1)
BILIRUB SERPL-MCNC: 0.3 MG/DL (ref 0.2–1)
BILIRUB UR QL: NEGATIVE
BUN SERPL-MCNC: 24 MG/DL (ref 6–20)
BUN/CREAT SERPL: 20 (ref 12–20)
CALCIUM SERPL-MCNC: 8.9 MG/DL (ref 8.5–10.1)
CHLORIDE SERPL-SCNC: 100 MMOL/L (ref 97–108)
CO2 SERPL-SCNC: 30 MMOL/L (ref 21–32)
COLOR UR: ABNORMAL
CREAT SERPL-MCNC: 1.18 MG/DL (ref 0.55–1.02)
DIFFERENTIAL METHOD BLD: ABNORMAL
EKG ATRIAL RATE: 50 BPM
EKG DIAGNOSIS: NORMAL
EKG P AXIS: 37 DEGREES
EKG P-R INTERVAL: 184 MS
EKG Q-T INTERVAL: 456 MS
EKG QRS DURATION: 92 MS
EKG QTC CALCULATION (BAZETT): 415 MS
EKG R AXIS: 52 DEGREES
EKG T AXIS: 58 DEGREES
EKG VENTRICULAR RATE: 50 BPM
EOSINOPHIL # BLD: 0.1 K/UL (ref 0–0.4)
EOSINOPHIL NFR BLD: 0.9 % (ref 0–7)
EPITH CASTS URNS QL MICRO: ABNORMAL /LPF
ERYTHROCYTE [DISTWIDTH] IN BLOOD BY AUTOMATED COUNT: 15.1 % (ref 11.5–14.5)
ETHANOL SERPL-MCNC: <10 MG/DL (ref 0–0.08)
GLOBULIN SER CALC-MCNC: 3.6 G/DL (ref 2–4)
GLUCOSE SERPL-MCNC: 119 MG/DL (ref 65–100)
GLUCOSE UR STRIP.AUTO-MCNC: NEGATIVE MG/DL
HCT VFR BLD AUTO: 35.3 % (ref 35–47)
HGB BLD-MCNC: 11.5 G/DL (ref 11.5–16)
HGB UR QL STRIP: NEGATIVE
HYALINE CASTS URNS QL MICRO: ABNORMAL /LPF (ref 0–2)
IMM GRANULOCYTES # BLD AUTO: 0.05 K/UL (ref 0–0.04)
IMM GRANULOCYTES NFR BLD AUTO: 0.5 % (ref 0–0.5)
INR PPP: 1 (ref 0.9–1.1)
KETONES UR QL STRIP.AUTO: NEGATIVE MG/DL
LEUKOCYTE ESTERASE UR QL STRIP.AUTO: ABNORMAL
LIPASE SERPL-CCNC: 33 U/L (ref 13–75)
LYMPHOCYTES # BLD: 2.06 K/UL (ref 0.8–3.5)
LYMPHOCYTES NFR BLD: 19.4 % (ref 12–49)
MAGNESIUM SERPL-MCNC: 2.2 MG/DL (ref 1.6–2.4)
MCH RBC QN AUTO: 29.3 PG (ref 26–34)
MCHC RBC AUTO-ENTMCNC: 32.6 G/DL (ref 30–36.5)
MCV RBC AUTO: 89.8 FL (ref 80–99)
MONOCYTES # BLD: 0.96 K/UL (ref 0–1)
MONOCYTES NFR BLD: 9.1 % (ref 5–13)
NEUTS SEG # BLD: 7.4 K/UL (ref 1.8–8)
NEUTS SEG NFR BLD: 69.8 % (ref 32–75)
NITRITE UR QL STRIP.AUTO: NEGATIVE
NRBC # BLD: 0 K/UL (ref 0–0.01)
NRBC BLD-RTO: 0 PER 100 WBC
PH UR STRIP: 8 (ref 5–8)
PHOSPHATE SERPL-MCNC: 3.3 MG/DL (ref 2.6–4.7)
PLATELET # BLD AUTO: 290 K/UL (ref 150–400)
PMV BLD AUTO: 11.1 FL (ref 8.9–12.9)
POTASSIUM SERPL-SCNC: 4.5 MMOL/L (ref 3.5–5.1)
PROT SERPL-MCNC: 7.5 G/DL (ref 6.4–8.2)
PROT UR STRIP-MCNC: NEGATIVE MG/DL
PROTHROMBIN TIME: 10.2 SEC (ref 9.2–11.2)
RBC # BLD AUTO: 3.93 M/UL (ref 3.8–5.2)
RBC #/AREA URNS HPF: ABNORMAL /HPF (ref 0–5)
SODIUM SERPL-SCNC: 133 MMOL/L (ref 136–145)
SP GR UR REFRACTOMETRY: 1.01
TROPONIN I SERPL HS-MCNC: 9 NG/L (ref 0–51)
URINE CULTURE IF INDICATED: ABNORMAL
UROBILINOGEN UR QL STRIP.AUTO: 0.2 EU/DL (ref 0.2–1)
WBC # BLD AUTO: 10.6 K/UL (ref 3.6–11)
WBC URNS QL MICRO: ABNORMAL /HPF (ref 0–4)

## 2025-07-11 PROCEDURE — 81001 URINALYSIS AUTO W/SCOPE: CPT

## 2025-07-11 PROCEDURE — 6360000002 HC RX W HCPCS: Performed by: STUDENT IN AN ORGANIZED HEALTH CARE EDUCATION/TRAINING PROGRAM

## 2025-07-11 PROCEDURE — 85025 COMPLETE CBC W/AUTO DIFF WBC: CPT

## 2025-07-11 PROCEDURE — 87476 LYME DIS DNA AMP PROBE: CPT

## 2025-07-11 PROCEDURE — 2580000003 HC RX 258: Performed by: STUDENT IN AN ORGANIZED HEALTH CARE EDUCATION/TRAINING PROGRAM

## 2025-07-11 PROCEDURE — 83036 HEMOGLOBIN GLYCOSYLATED A1C: CPT

## 2025-07-11 PROCEDURE — 82077 ASSAY SPEC XCP UR&BREATH IA: CPT

## 2025-07-11 PROCEDURE — 36415 COLL VENOUS BLD VENIPUNCTURE: CPT

## 2025-07-11 PROCEDURE — 2060000000 HC ICU INTERMEDIATE R&B

## 2025-07-11 PROCEDURE — 2500000003 HC RX 250 WO HCPCS: Performed by: STUDENT IN AN ORGANIZED HEALTH CARE EDUCATION/TRAINING PROGRAM

## 2025-07-11 PROCEDURE — 70450 CT HEAD/BRAIN W/O DYE: CPT

## 2025-07-11 PROCEDURE — 80061 LIPID PANEL: CPT

## 2025-07-11 PROCEDURE — 99285 EMERGENCY DEPT VISIT HI MDM: CPT

## 2025-07-11 PROCEDURE — 83690 ASSAY OF LIPASE: CPT

## 2025-07-11 PROCEDURE — 84439 ASSAY OF FREE THYROXINE: CPT

## 2025-07-11 PROCEDURE — 83735 ASSAY OF MAGNESIUM: CPT

## 2025-07-11 PROCEDURE — 96361 HYDRATE IV INFUSION ADD-ON: CPT

## 2025-07-11 PROCEDURE — 84443 ASSAY THYROID STIM HORMONE: CPT

## 2025-07-11 PROCEDURE — 93005 ELECTROCARDIOGRAM TRACING: CPT | Performed by: STUDENT IN AN ORGANIZED HEALTH CARE EDUCATION/TRAINING PROGRAM

## 2025-07-11 PROCEDURE — 6370000000 HC RX 637 (ALT 250 FOR IP): Performed by: STUDENT IN AN ORGANIZED HEALTH CARE EDUCATION/TRAINING PROGRAM

## 2025-07-11 PROCEDURE — 84100 ASSAY OF PHOSPHORUS: CPT

## 2025-07-11 PROCEDURE — 80053 COMPREHEN METABOLIC PANEL: CPT

## 2025-07-11 PROCEDURE — 84484 ASSAY OF TROPONIN QUANT: CPT

## 2025-07-11 PROCEDURE — 85610 PROTHROMBIN TIME: CPT

## 2025-07-11 PROCEDURE — 96374 THER/PROPH/DIAG INJ IV PUSH: CPT

## 2025-07-11 RX ORDER — HYDRALAZINE HYDROCHLORIDE 10 MG/1
10 TABLET, FILM COATED ORAL
Status: COMPLETED | OUTPATIENT
Start: 2025-07-11 | End: 2025-07-11

## 2025-07-11 RX ORDER — ASPIRIN 81 MG/1
81 TABLET, CHEWABLE ORAL DAILY
Status: DISCONTINUED | OUTPATIENT
Start: 2025-07-11 | End: 2025-07-13 | Stop reason: HOSPADM

## 2025-07-11 RX ORDER — ONDANSETRON 2 MG/ML
4 INJECTION INTRAMUSCULAR; INTRAVENOUS ONCE
Status: COMPLETED | OUTPATIENT
Start: 2025-07-11 | End: 2025-07-11

## 2025-07-11 RX ORDER — ATORVASTATIN CALCIUM 40 MG/1
80 TABLET, FILM COATED ORAL NIGHTLY
Status: DISCONTINUED | OUTPATIENT
Start: 2025-07-11 | End: 2025-07-13 | Stop reason: HOSPADM

## 2025-07-11 RX ORDER — ATORVASTATIN CALCIUM 10 MG/1
10 TABLET, FILM COATED ORAL DAILY
Status: DISCONTINUED | OUTPATIENT
Start: 2025-07-11 | End: 2025-07-11

## 2025-07-11 RX ORDER — SODIUM CHLORIDE 9 MG/ML
INJECTION, SOLUTION INTRAVENOUS PRN
Status: DISCONTINUED | OUTPATIENT
Start: 2025-07-11 | End: 2025-07-13 | Stop reason: HOSPADM

## 2025-07-11 RX ORDER — SODIUM CHLORIDE 0.9 % (FLUSH) 0.9 %
5-40 SYRINGE (ML) INJECTION EVERY 12 HOURS SCHEDULED
Status: DISCONTINUED | OUTPATIENT
Start: 2025-07-11 | End: 2025-07-13 | Stop reason: HOSPADM

## 2025-07-11 RX ORDER — PANTOPRAZOLE SODIUM 40 MG/1
40 TABLET, DELAYED RELEASE ORAL
Status: DISCONTINUED | OUTPATIENT
Start: 2025-07-12 | End: 2025-07-13 | Stop reason: HOSPADM

## 2025-07-11 RX ORDER — PROCHLORPERAZINE EDISYLATE 5 MG/ML
5 INJECTION INTRAMUSCULAR; INTRAVENOUS ONCE
Status: DISCONTINUED | OUTPATIENT
Start: 2025-07-11 | End: 2025-07-11

## 2025-07-11 RX ORDER — ENOXAPARIN SODIUM 100 MG/ML
40 INJECTION SUBCUTANEOUS DAILY
Status: DISCONTINUED | OUTPATIENT
Start: 2025-07-11 | End: 2025-07-13 | Stop reason: HOSPADM

## 2025-07-11 RX ORDER — THIAMINE HYDROCHLORIDE 100 MG/ML
100 INJECTION, SOLUTION INTRAMUSCULAR; INTRAVENOUS DAILY
Status: DISCONTINUED | OUTPATIENT
Start: 2025-07-12 | End: 2025-07-13 | Stop reason: HOSPADM

## 2025-07-11 RX ORDER — SENNA AND DOCUSATE SODIUM 50; 8.6 MG/1; MG/1
1 TABLET, FILM COATED ORAL DAILY
Status: DISCONTINUED | OUTPATIENT
Start: 2025-07-11 | End: 2025-07-13 | Stop reason: HOSPADM

## 2025-07-11 RX ORDER — TIMOLOL MALEATE 5 MG/ML
1 SOLUTION/ DROPS OPHTHALMIC DAILY
Status: DISCONTINUED | OUTPATIENT
Start: 2025-07-11 | End: 2025-07-13 | Stop reason: HOSPADM

## 2025-07-11 RX ORDER — ASPIRIN 300 MG/1
300 SUPPOSITORY RECTAL DAILY
Status: DISCONTINUED | OUTPATIENT
Start: 2025-07-11 | End: 2025-07-13 | Stop reason: HOSPADM

## 2025-07-11 RX ORDER — POLYETHYLENE GLYCOL 3350 17 G/17G
17 POWDER, FOR SOLUTION ORAL DAILY PRN
Status: DISCONTINUED | OUTPATIENT
Start: 2025-07-11 | End: 2025-07-13 | Stop reason: HOSPADM

## 2025-07-11 RX ORDER — PROCHLORPERAZINE EDISYLATE 5 MG/ML
10 INJECTION INTRAMUSCULAR; INTRAVENOUS ONCE
Status: COMPLETED | OUTPATIENT
Start: 2025-07-11 | End: 2025-07-11

## 2025-07-11 RX ORDER — HYDROCHLOROTHIAZIDE 12.5 MG/1
12.5 CAPSULE ORAL DAILY
COMMUNITY

## 2025-07-11 RX ORDER — 0.9 % SODIUM CHLORIDE 0.9 %
1000 INTRAVENOUS SOLUTION INTRAVENOUS ONCE
Status: COMPLETED | OUTPATIENT
Start: 2025-07-11 | End: 2025-07-11

## 2025-07-11 RX ORDER — ONDANSETRON 2 MG/ML
4 INJECTION INTRAMUSCULAR; INTRAVENOUS EVERY 6 HOURS PRN
Status: DISCONTINUED | OUTPATIENT
Start: 2025-07-11 | End: 2025-07-13 | Stop reason: HOSPADM

## 2025-07-11 RX ORDER — ONDANSETRON 4 MG/1
4 TABLET, ORALLY DISINTEGRATING ORAL EVERY 8 HOURS PRN
Status: DISCONTINUED | OUTPATIENT
Start: 2025-07-11 | End: 2025-07-13 | Stop reason: HOSPADM

## 2025-07-11 RX ORDER — CITALOPRAM HYDROBROMIDE 20 MG/1
20 TABLET ORAL DAILY
Status: DISCONTINUED | OUTPATIENT
Start: 2025-07-11 | End: 2025-07-13 | Stop reason: HOSPADM

## 2025-07-11 RX ORDER — SODIUM CHLORIDE 0.9 % (FLUSH) 0.9 %
5-40 SYRINGE (ML) INJECTION PRN
Status: DISCONTINUED | OUTPATIENT
Start: 2025-07-11 | End: 2025-07-13 | Stop reason: HOSPADM

## 2025-07-11 RX ORDER — MAGNESIUM SULFATE IN WATER 40 MG/ML
2000 INJECTION, SOLUTION INTRAVENOUS ONCE
Status: COMPLETED | OUTPATIENT
Start: 2025-07-11 | End: 2025-07-11

## 2025-07-11 RX ORDER — LOSARTAN POTASSIUM 50 MG/1
50 TABLET ORAL DAILY
Status: DISCONTINUED | OUTPATIENT
Start: 2025-07-11 | End: 2025-07-12

## 2025-07-11 RX ORDER — ACETAMINOPHEN 500 MG
1000 TABLET ORAL
Status: COMPLETED | OUTPATIENT
Start: 2025-07-11 | End: 2025-07-11

## 2025-07-11 RX ADMIN — CITALOPRAM HYDROBROMIDE 20 MG: 20 TABLET ORAL at 22:22

## 2025-07-11 RX ADMIN — ASPIRIN 81 MG: 81 TABLET, CHEWABLE ORAL at 22:07

## 2025-07-11 RX ADMIN — SODIUM CHLORIDE, PRESERVATIVE FREE 10 ML: 5 INJECTION INTRAVENOUS at 22:09

## 2025-07-11 RX ADMIN — ACETAMINOPHEN 1000 MG: 500 TABLET ORAL at 19:02

## 2025-07-11 RX ADMIN — MAGNESIUM SULFATE HEPTAHYDRATE 2000 MG: 40 INJECTION, SOLUTION INTRAVENOUS at 21:59

## 2025-07-11 RX ADMIN — PROCHLORPERAZINE EDISYLATE 10 MG: 5 INJECTION INTRAMUSCULAR; INTRAVENOUS at 21:55

## 2025-07-11 RX ADMIN — SODIUM CHLORIDE 1000 ML: 0.9 INJECTION, SOLUTION INTRAVENOUS at 19:02

## 2025-07-11 RX ADMIN — HYDRALAZINE HYDROCHLORIDE 10 MG: 10 TABLET ORAL at 19:02

## 2025-07-11 RX ADMIN — ATORVASTATIN CALCIUM 80 MG: 40 TABLET, FILM COATED ORAL at 22:10

## 2025-07-11 RX ADMIN — LOSARTAN POTASSIUM 50 MG: 50 TABLET, FILM COATED ORAL at 22:22

## 2025-07-11 RX ADMIN — ONDANSETRON 4 MG: 2 INJECTION, SOLUTION INTRAMUSCULAR; INTRAVENOUS at 19:02

## 2025-07-11 RX ADMIN — NICARDIPINE HYDROCHLORIDE 5 MG/HR: 25 INJECTION INTRAVENOUS at 22:07

## 2025-07-11 RX ADMIN — SENNOSIDES AND DOCUSATE SODIUM 1 TABLET: 50; 8.6 TABLET ORAL at 22:22

## 2025-07-11 RX ADMIN — ENOXAPARIN SODIUM 40 MG: 100 INJECTION SUBCUTANEOUS at 23:17

## 2025-07-11 ASSESSMENT — PAIN SCALES - GENERAL
PAINLEVEL_OUTOF10: 5
PAINLEVEL_OUTOF10: 5

## 2025-07-11 ASSESSMENT — PAIN DESCRIPTION - LOCATION
LOCATION: HEAD
LOCATION: HEAD

## 2025-07-11 NOTE — ED NOTES
Bedside report given to BE swan by BE rosario. Nurse was informed of reason for arrival, vitals, labs, medications, orders, procedures, results, cardiac rhythm, any outstanding and pending orders and plan of care. Opportunity for questions were provided for receiving RN at this time.

## 2025-07-11 NOTE — ED NOTES
Assumed care of patient at this time, patient found to be resting in  bed, patient found to be alert and oriented Xs 4, patient reports pain level of a 5/10, allowed time for questions, discussed current plan of care, bed in lowest position, call bell in place, no further needs at this time.

## 2025-07-12 ENCOUNTER — APPOINTMENT (OUTPATIENT)
Facility: HOSPITAL | Age: 85
DRG: 305 | End: 2025-07-12
Payer: MEDICARE

## 2025-07-12 PROBLEM — G31.84 MCI (MILD COGNITIVE IMPAIRMENT): Status: ACTIVE | Noted: 2025-07-12

## 2025-07-12 PROBLEM — I67.4 HYPERTENSIVE ENCEPHALOPATHY: Status: ACTIVE | Noted: 2025-07-12

## 2025-07-12 PROBLEM — R41.82 ALTERED MENTAL STATUS: Status: ACTIVE | Noted: 2025-07-12

## 2025-07-12 PROBLEM — R47.81 SLURRED SPEECH: Status: ACTIVE | Noted: 2025-07-12

## 2025-07-12 LAB
ANION GAP SERPL CALC-SCNC: 5 MMOL/L (ref 2–12)
BUN SERPL-MCNC: 19 MG/DL (ref 6–20)
BUN/CREAT SERPL: 17 (ref 12–20)
CALCIUM SERPL-MCNC: 8.7 MG/DL (ref 8.5–10.1)
CHLORIDE SERPL-SCNC: 106 MMOL/L (ref 97–108)
CHOLEST SERPL-MCNC: 122 MG/DL
CO2 SERPL-SCNC: 25 MMOL/L (ref 21–32)
CREAT SERPL-MCNC: 1.09 MG/DL (ref 0.55–1.02)
EST. AVERAGE GLUCOSE BLD GHB EST-MCNC: 117 MG/DL
GLUCOSE SERPL-MCNC: 100 MG/DL (ref 65–100)
HBA1C MFR BLD: 5.7 % (ref 4–5.6)
HDLC SERPL-MCNC: 61 MG/DL
HDLC SERPL: 2 (ref 0–5)
LDLC SERPL CALC-MCNC: 46.6 MG/DL (ref 0–100)
MAGNESIUM SERPL-MCNC: 2.7 MG/DL (ref 1.6–2.4)
PHOSPHATE SERPL-MCNC: 3.2 MG/DL (ref 2.6–4.7)
POTASSIUM SERPL-SCNC: 4.3 MMOL/L (ref 3.5–5.1)
SODIUM SERPL-SCNC: 136 MMOL/L (ref 136–145)
T4 FREE SERPL-MCNC: 1.1 NG/DL (ref 0.8–1.5)
TRIGL SERPL-MCNC: 72 MG/DL
TSH SERPL DL<=0.05 MIU/L-ACNC: 1.15 UIU/ML (ref 0.36–3.74)
VLDLC SERPL CALC-MCNC: 14.4 MG/DL

## 2025-07-12 PROCEDURE — 70551 MRI BRAIN STEM W/O DYE: CPT

## 2025-07-12 PROCEDURE — 2060000000 HC ICU INTERMEDIATE R&B

## 2025-07-12 PROCEDURE — 2500000003 HC RX 250 WO HCPCS: Performed by: STUDENT IN AN ORGANIZED HEALTH CARE EDUCATION/TRAINING PROGRAM

## 2025-07-12 PROCEDURE — 99223 1ST HOSP IP/OBS HIGH 75: CPT | Performed by: PSYCHIATRY & NEUROLOGY

## 2025-07-12 PROCEDURE — 97162 PT EVAL MOD COMPLEX 30 MIN: CPT

## 2025-07-12 PROCEDURE — 80048 BASIC METABOLIC PNL TOTAL CA: CPT

## 2025-07-12 PROCEDURE — 84100 ASSAY OF PHOSPHORUS: CPT

## 2025-07-12 PROCEDURE — 6370000000 HC RX 637 (ALT 250 FOR IP): Performed by: INTERNAL MEDICINE

## 2025-07-12 PROCEDURE — 36415 COLL VENOUS BLD VENIPUNCTURE: CPT

## 2025-07-12 PROCEDURE — 6360000002 HC RX W HCPCS: Performed by: STUDENT IN AN ORGANIZED HEALTH CARE EDUCATION/TRAINING PROGRAM

## 2025-07-12 PROCEDURE — 97116 GAIT TRAINING THERAPY: CPT

## 2025-07-12 PROCEDURE — 83735 ASSAY OF MAGNESIUM: CPT

## 2025-07-12 PROCEDURE — 6370000000 HC RX 637 (ALT 250 FOR IP): Performed by: STUDENT IN AN ORGANIZED HEALTH CARE EDUCATION/TRAINING PROGRAM

## 2025-07-12 RX ORDER — HYDRALAZINE HYDROCHLORIDE 50 MG/1
50 TABLET, FILM COATED ORAL EVERY 8 HOURS SCHEDULED
Status: DISCONTINUED | OUTPATIENT
Start: 2025-07-12 | End: 2025-07-13 | Stop reason: HOSPADM

## 2025-07-12 RX ORDER — LOSARTAN POTASSIUM 100 MG/1
100 TABLET ORAL DAILY
Status: DISCONTINUED | OUTPATIENT
Start: 2025-07-12 | End: 2025-07-13 | Stop reason: HOSPADM

## 2025-07-12 RX ADMIN — POLYETHYLENE GLYCOL 3350 17 G: 17 POWDER, FOR SOLUTION ORAL at 20:27

## 2025-07-12 RX ADMIN — PANTOPRAZOLE SODIUM 40 MG: 40 TABLET, DELAYED RELEASE ORAL at 17:05

## 2025-07-12 RX ADMIN — ASPIRIN 81 MG: 81 TABLET, CHEWABLE ORAL at 09:03

## 2025-07-12 RX ADMIN — HYDRALAZINE HYDROCHLORIDE 50 MG: 50 TABLET ORAL at 13:32

## 2025-07-12 RX ADMIN — HYDRALAZINE HYDROCHLORIDE 50 MG: 50 TABLET ORAL at 21:15

## 2025-07-12 RX ADMIN — ENOXAPARIN SODIUM 40 MG: 100 INJECTION SUBCUTANEOUS at 09:17

## 2025-07-12 RX ADMIN — SODIUM CHLORIDE, PRESERVATIVE FREE 10 ML: 5 INJECTION INTRAVENOUS at 09:18

## 2025-07-12 RX ADMIN — LOSARTAN POTASSIUM 100 MG: 100 TABLET, FILM COATED ORAL at 09:03

## 2025-07-12 RX ADMIN — MELATONIN 3 MG: at 21:15

## 2025-07-12 RX ADMIN — THIAMINE HYDROCHLORIDE 100 MG: 100 INJECTION, SOLUTION INTRAMUSCULAR; INTRAVENOUS at 09:18

## 2025-07-12 RX ADMIN — SODIUM CHLORIDE, PRESERVATIVE FREE 10 ML: 5 INJECTION INTRAVENOUS at 21:15

## 2025-07-12 RX ADMIN — PANTOPRAZOLE SODIUM 40 MG: 40 TABLET, DELAYED RELEASE ORAL at 06:18

## 2025-07-12 RX ADMIN — CITALOPRAM HYDROBROMIDE 20 MG: 20 TABLET ORAL at 09:03

## 2025-07-12 RX ADMIN — ATORVASTATIN CALCIUM 80 MG: 40 TABLET, FILM COATED ORAL at 21:15

## 2025-07-12 RX ADMIN — SENNOSIDES AND DOCUSATE SODIUM 1 TABLET: 50; 8.6 TABLET ORAL at 09:03

## 2025-07-12 ASSESSMENT — PAIN SCALES - GENERAL
PAINLEVEL_OUTOF10: 0

## 2025-07-12 NOTE — PLAN OF CARE
Problem: Safety - Adult  Goal: Free from fall injury  Outcome: Progressing  Note: Bed alarm is on, the caregiver is at the bedside, the bed is in the lowest position and wheels are locked, call bell is within reach, bathroom light is on during evening hours, gripper socks are on and patient has been instructed to call out for assistance if needed.     As of now, patient is free from falls and will continue to be monitored.

## 2025-07-12 NOTE — CONSULTS
[facility]  [address] [place] [address]  [phone]  [email or web address]       CARDIOLOGY CONSULTATION       Date of  Admission: 7/11/2025  5:33 PM     Admission type:Emergency   Primary Care Physician:Hadyen Lopez MD     Attending Provider: Evelyn Brown MD  Cardiology Provider: Paige     PLEASE NOTE THAT WE CONFIRMED WITH THE REFERRING PHYSICIAN PRIOR TO SEEING THE PATIENT THAT THE PATIENT IS BEING REFERRED FOR INITIAL HOSPITAL EVALUATION AND FOR LONG-TERM ONGOING CARDIAC CARE    CC/REASON FOR CONSULT: bradycardia      Subjective:     Bandar Montano is a 85 y.o. female admitted for Tension headache [G44.209]  Hypertensive encephalopathy [I67.4]  Hypertensive crisis [I16.9]  PRESLEY (acute kidney injury) [N17.9]  Altered mental status, unspecified altered mental status type [R41.82].    Patient is a 85-year-old female with a past medical history of hypertension, bradycardia has been admitted for hypertensive encephalopathy.  Cardiology consulted for bradycardia.  She was currently wearing a M cot device after office visit last week.    Patient Active Problem List    Diagnosis Date Noted    Hypertensive crisis 07/11/2025    Bilateral carotid artery stenosis 06/16/2025    Syncope 06/16/2025    Syncope and collapse 06/15/2025    Multifocal pneumonia 03/30/2024    Community acquired pneumonia, bilateral 03/30/2024    Osteoarthrosis, localized, primary, knee, left 12/10/2019    Septic arthritis (HCC) 08/30/2019    Rectocele 03/29/2011      Hayden Lopez MD  Past Medical History:   Diagnosis Date    Arrhythmia     ?IRREGULAR HEARTBEAT    Arthritis     BACK    Colon polyps     Dr Marcelo    Diverticulitis     GERD (gastroesophageal reflux disease)     OCCASIONALLY    Hypertension     Ill-defined condition     stomach ulcer    Nausea & vomiting     Other ill-defined conditions(799.89)     DIVERTICULOSIS (HX: DIVERTICULITIS)    Other ill-defined

## 2025-07-12 NOTE — H&P
Hospitalist Admission Note    NAME:   Bandar Montano   : 1940   MRN: [id number]     Date/Time: 2025 8:34 PM    Patient PCP: Hayden Lopez MD    ______________________________________________________________________  Given the patient's current clinical presentation, I have a high level of concern for decompensation if discharged from the emergency department.  Complex decision making was performed, which includes reviewing the patient's available past medical records, laboratory results, and x-ray films.       My assessment of this patient's clinical condition and my plan of care is as follows.    Assessment / Plan:  CVA evaluation, POA  Hypertensive crisis, POA  Sinus bradycardia, POA  Hx syncope  Hx uncontrolled HTN with hypertensive urgency  -Presents with headache, confusion, slurry speech  - Recently hospitalized for syncopal episode on 6/15/2025, at that time echo shows EF of 70%, carotid ultrasound negative for significant stenosis.  - Blood pressure on this admission was in the 200s systolic, bradycardia 40s-50s  -Has a loop recorder monitor for bradycardia by cardiology  - CT head negative for intracranial pathology  - CBC negative for leukocytosis  - CMP showed hyponatremia  -UA negative  - Ethanol within normal limit  - Troponin 9  -Likely symptoms from hypertensive crisis, CVA or unknown etiology,   -Admit for observation/further evaluation; telemetry monitoring.  -Will start nicardipine drip  -Frequent neurochecks, vital sign parameters  -MRI Brain without contrast to evaluate for any  structural abnormalities that may be contributing to symptoms.   -Aspirin 81 mg po q day and high intensity statin  -Fasting lipid panel, HbA1c,   -Had a recent echo, if MRI positive will order 1  -Bedside swallow evaluation; formal SLP consultation as well   -PT/OT consultations   -Stroke education  -Neuro checks per unit routine.   -Neuro consult   -Hold timolol will check TSH, Lyme's, for  Range    WBC 10.6 3.6 - 11.0 K/uL    RBC 3.93 3.80 - 5.20 M/uL    Hemoglobin 11.5 11.5 - 16.0 g/dL    Hematocrit 35.3 35.0 - 47.0 %    MCV 89.8 80.0 - 99.0 FL    MCH 29.3 26.0 - 34.0 PG    MCHC 32.6 30.0 - 36.5 g/dL    RDW 15.1 (H) 11.5 - 14.5 %    Platelets 290 150 - 400 K/uL    MPV 11.1 8.9 - 12.9 FL    Nucleated RBCs 0.0 0  WBC    nRBC 0.00 0.00 - 0.01 K/uL    Neutrophils % 69.8 32.0 - 75.0 %    Lymphocytes % 19.4 12.0 - 49.0 %    Monocytes % 9.1 5.0 - 13.0 %    Eosinophils % 0.9 0.0 - 7.0 %    Basophils % 0.3 0.0 - 1.0 %    Immature Granulocytes % 0.5 0.0 - 0.5 %    Neutrophils Absolute 7.40 1.80 - 8.00 K/UL    Lymphocytes Absolute 2.06 0.80 - 3.50 K/UL    Monocytes Absolute 0.96 0.00 - 1.00 K/UL    Eosinophils Absolute 0.10 0.00 - 0.40 K/UL    Basophils Absolute 0.03 0.00 - 0.10 K/UL    Immature Granulocytes Absolute 0.05 (H) 0.00 - 0.04 K/UL    Differential Type AUTOMATED     Comprehensive Metabolic Panel    Collection Time: 07/11/25  5:09 PM   Result Value Ref Range    Sodium 133 (L) 136 - 145 mmol/L    Potassium 4.5 3.5 - 5.1 mmol/L    Chloride 100 97 - 108 mmol/L    CO2 30 21 - 32 mmol/L    Anion Gap 3 2 - 12 mmol/L    Glucose 119 (H) 65 - 100 mg/dL    BUN 24 (H) 6 - 20 MG/DL    Creatinine 1.18 (H) 0.55 - 1.02 MG/DL    BUN/Creatinine Ratio 20 12 - 20      Est, Glom Filt Rate 45 (L) >60 ml/min/1.73m2    Calcium 8.9 8.5 - 10.1 MG/DL    Total Bilirubin 0.3 0.2 - 1.0 MG/DL    ALT 21 12 - 78 U/L    AST 17 15 - 37 U/L    Alk Phosphatase 50 45 - 117 U/L    Total Protein 7.5 6.4 - 8.2 g/dL    Albumin 3.9 3.5 - 5.0 g/dL    Globulin 3.6 2.0 - 4.0 g/dL    Albumin/Globulin Ratio 1.1 1.1 - 2.2     EKG 12 Lead    Collection Time: 07/11/25  6:35 PM   Result Value Ref Range    Ventricular Rate 50 BPM    Atrial Rate 50 BPM    P-R Interval 184 ms    QRS Duration 92 ms    Q-T Interval 456 ms    QTc Calculation (Bazett) 415 ms    P Axis 37 degrees    R Axis 52 degrees    T Axis 58 degrees    Diagnosis       Sinus

## 2025-07-12 NOTE — PROGRESS NOTES
-Please complete MRI History and Safety Screening Form.    - Patient cannot be scanned until this form is completed and reviewed in MRI to ensure patient is SAFE and eligible for MRI.  - CALL MRI when this has been successfully completed at [phone].

## 2025-07-12 NOTE — CONSULTS
Neurology Note    Patient ID:  Bandar Montano  [id number]  85 y.o.  1940      Date of Consultation:  July 12, 2025    Referring Physician: Dr. Pierce    Reason for Consultation:  slurred speech      Assessment and Plan:    The patient is a pleasant 85-year-old female with transient episode of confusion and headache.  Her neurological examination reveals no focal sensory or motor deficit.  There is mild cognitive slowing.    Transient headache and confusion:  Head CT with no acute abnormality  Differential includes hypertensive emergency, TIA, stroke with improvement of symptoms  Brain MRI has been ordered  Continue 81 mg aspirin  Risk factors for stroke to include hypertension and dyslipidemia  Hypertension: Aggressive control with goal blood pressure of less than 140/90  Dyslipidemia: LDL is at goal.  Continue current statin therapy  Cardiology consult was performed due to hypertension and bradycardia  Hemoglobin A1c at goal  Echocardiogram was completed within the past month  Carotid Doppler study revealed no large vessel stenosis  Additional testing may need to be considered depending on the results of the brain MRI  PT and OT consultation      Mild cognitive impairment:  I did discuss this at length with the patient and her daughter today  After discharge, the patient will be scheduled for follow-up in the neurology clinic for more detailed cognitive testing and additional lab testing if necessary  Recent tsh was normal  I have ordered a vitamin b12 level  They are in agreement with this plan      Neurology will continue to follow closely in this complicated patient with ongoing neurological symptoms necessitating additional testing. Updated orders placed.  Care plan discussed with primary team.              Subjective: my speech       History of Present Illness:   Bandar Montano is a 85 y.o. female with a history of uncontrolled hypertension, dyslipidemia and syncope who was brought to the hospital  was absent  No evidence of fasciculations    Strength testing:   deltoid triceps biceps Wrist ext. Wrist flex. intrinsics Hip flex. Hip ext. Knee ext.  Knee flex Dorsi flex Plantar flex   Right 5 5 5 5 5 5 5 5 5 5 5 5   Left 5 5 5 5 5 5 5 5 5 5 5 5         Sensory:  Upper extremity: intact to pp,  Lower extremity: intact to pp,  Patient could not comprehend more detailed sensory testing    Reflexes:    Right Left  Biceps  2 2  Triceps 2 2  Brachiorad. 2 2  Patella  2 2  Achilles 1 1    Plantar response:  flexor bilaterally      Cerebellar testing:  no tremor apparent, finger/nose and breanne were intact  Needed assistance in getting to a standing position  Slightly wide-based with her gait    Labs:     Lab Results   Component Value Date/Time     07/12/2025 02:46 AM    K 4.3 07/12/2025 02:46 AM     07/12/2025 02:46 AM    BUN 19 07/12/2025 02:46 AM    WBC 10.6 07/11/2025 05:09 PM    HCT 35.3 07/11/2025 05:09 PM    HGB 11.5 07/11/2025 05:09 PM     07/11/2025 05:09 PM    LDL 46.6 07/11/2025 06:36 PM                      Principal Problem:    Hypertensive crisis  Resolved Problems:    * No resolved hospital problems. *      Complex decision making was necessary due to the patient's current medical condition and other medical co-morbidities.            Signed By:  Marvin Singletary DO FAAN    July 12, 2025

## 2025-07-12 NOTE — PROGRESS NOTES
Speech Pathology Contact Note:    Orders received and appreciated for SLP evaluation. Per discussion with RN, no concerns for speech/communication changes or swallowing difficulty. Patient currently tolerating regular diet with thin liquids without concern. MRI 7/12- \"No acute finding.\" No further acute SLP services warranted at this time. SLP will sign off. Please re-consult if concerns arise in the future. Thanks!     Geri Trevino CCC-SLP

## 2025-07-12 NOTE — ED PROVIDER NOTES
[facility] EMERGENCY DEPARTMENT  EMERGENCY DEPARTMENT ENCOUNTER       Pt Name: Bandar Montano  MRN: [id number]  Birthdate 1940  Date of evaluation: 7/11/2025  Provider: Kulwinder Lara MD   PCP: Hayden Lopez MD  Note Started: 8:00 PM EDT 7/11/25     CHIEF COMPLAINT       Chief Complaint   Patient presents with    Headache     Pt wheeled into triage cc of her head \"not feeling right\" since this morning. Aox4 but states she feels confused. Denies blurred vision, numbness, unilateral weakness. Is currently wearing a heart monitor for bradycardia. Rates headache 5/10. NO NVDC        HISTORY OF PRESENT ILLNESS: 1 or more elements      History From: Patient and Patient's Daughter  HPI Limitations: Altered Mental Status     Bandar Montano is a 85 y.o. female who presents from home accompanied by her daughter for confusion.  Patient describes \"not feeling right\".  Denies falls or injury.  She reports that dull, diffuse headache.  Denies falls or  focal weakness.  Denies fever, chills.  No changes to her medications recently.  Patient has a history of hypertension reports taking her medication as prescribed.  Denies drug or alcohol use.       Nursing Notes were all reviewed and agreed with or any disagreements were addressed in the HPI.     REVIEW OF SYSTEMS      Review of Systems     Positives and Pertinent negatives as per HPI.    PAST HISTORY     Past Medical History:  Past Medical History:   Diagnosis Date    Arrhythmia     ?IRREGULAR HEARTBEAT    Arthritis     BACK    Colon polyps     Dr Marcelo    Diverticulitis     GERD (gastroesophageal reflux disease)     OCCASIONALLY    Hypertension     Ill-defined condition     stomach ulcer    Nausea & vomiting     Other ill-defined conditions(799.89)     DIVERTICULOSIS (HX: DIVERTICULITIS)    Other ill-defined conditions(799.89)     HI CHOLESTEROL    Psychiatric disorder     DEPRESSION         Past Surgical History:  Past Surgical History:   Procedure

## 2025-07-12 NOTE — PROGRESS NOTES
[facility]  [address] [place] [address] [phone]  [email or web address]      Consult received for bradycardia will see in AM rounds, reviewing data from [facility] placed 7/10/25 there have been no events of profound bradycardia     Austin Silver DNP, ANP-BC

## 2025-07-12 NOTE — PROGRESS NOTES
MRI PENDING    Completion of MRI Screening Sheet    Fax to [phone] when completed    Please call [phone]  When this is done    Thank You

## 2025-07-12 NOTE — PLAN OF CARE
Problem: Physical Therapy - Adult  Goal: By Discharge: Performs mobility at highest level of function for planned discharge setting.  See evaluation for individualized goals.  Description: FUNCTIONAL STATUS PRIOR TO ADMISSION: Independent w/ ambulation and ADLs. Denies history of falls.     HOME SUPPORT PRIOR TO ADMISSION: The patient lived with spouse who is able to assist as needed.    Physical Therapy Goals  Initiated 7/12/2025  1.  Patient will move from supine to sit and sit to supine, scoot up and down, and roll side to side in bed with independence within 7 day(s).    2.  Patient will perform sit to stand with independence within 7 day(s).  3.  Patient will transfer from bed to chair and chair to bed with independence using the least restrictive device within 7 day(s).  4.  Patient will ambulate with independence for 300 feet with the least restrictive device within 7 day(s).   Outcome: Progressing   PHYSICAL THERAPY EVALUATION    Patient: Bandar Montano (85 y.o. female)  Date: 7/12/2025  Primary Diagnosis: Tension headache [G44.209]  Hypertensive encephalopathy [I67.4]  Hypertensive crisis [I16.9]  PRESLEY (acute kidney injury) [N17.9]  Altered mental status, unspecified altered mental status type [R41.82]       Precautions:              ASSESSMENT :   DEFICITS/IMPAIRMENTS:   The patient is limited by mild cognitive impairments, generalized weakness, impaired standing balance, impaired activity tolerance, delayed processing, and overall impaired functional mobility. Pt A&O x4 however delayed responses/initiation noted. Strength equal and intact throughout formal and functional testing. Pt mobilized with SB/CGAx1, ambulating 150ft before fatigue. Gait speed decreased with overall shuffled gait pattern. No overt LOB however fair gait stability noted. Vital signs, including BP, stable throughout. Anticipate pt will continue to progress well with therapy and be appropriate to return home w/ HHPT and family at

## 2025-07-12 NOTE — PROGRESS NOTES
Hospitalist Progress Note    NAME:   Bandar Montano   : 1940   MRN: [id number]     Date/Time: 2025 4:36 PM  Patient PCP: Hayden Lopez MD    Estimated discharge date:  Barriers:       Assessment / Plan:    CVA evaluation, POA  Hypertensive crisis, POA  Sinus bradycardia, POA  Hx syncope  Hx uncontrolled HTN with hypertensive urgency  -Presents with headache, confusion, slurry speech  - Recently hospitalized for syncopal episode on 6/15/2025, at that time echo shows EF of 70%, carotid ultrasound negative for significant stenosis.  - Blood pressure on this admission was in the 200s systolic, bradycardia 40s-50s  -Has a loop recorder monitor for bradycardia by cardiology  - CT head negative for intracranial pathology  - CBC negative for leukocytosis  - CMP showed hyponatremia  -UA negative  - Ethanol within normal limit  - Troponin 9    Appreciate neuroconsult, cardiology consult  hold timolol  TSH  Patient has a cardiac monitor-avoid bob agents, follow-up as outpatient  B12 level  Increase losartan hydralazine added  Wean off nicardepene    CKD stage  Creatinine stable          Medical Decision Making: See  I personally reviewed labs: BC BMP  I personally reviewed imaging: CT  I personally reviewed EKG: Telemetry bradycardia  Toxic drug monitoring: Blood pressure on nicardipine drip  Discussed case with: Patient RN cardio    Daughter updated at bedside    Code Status: Full code  DVT Prophylaxis:   GI Prophylaxis:    Subjective:     Chief Complaint / Reason for Physician Visit  \" No acute complaints.  Daughter at bedside, patient almost near baseline\".  Discussed with RN events overnight.       Objective:     VITALS:   Last 24hrs VS reviewed since prior progress note. Most recent are:  Patient Vitals for the past 24 hrs:   BP Temp Temp src Pulse Resp SpO2 Height Weight   25 1500 (!) 145/69 -- -- 67 22 -- -- --   25 1400 132/77 -- -- 64 22 -- -- --   25 1332 (!) 146/72 -- --

## 2025-07-12 NOTE — PROGRESS NOTES
End of Shift Note    Bedside shift change report given to Carolyn LR(oncoming nurse) by Katie Barlow RN (offgoing nurse).  Report included the following information SBAR    Shift worked:  7a-7p     Shift summary and any significant changes:     Pt had MRI done. NIH scale done per order. No acute changes. Pt participated in therapy. Ongoing care provided.     Concerns for physician to address:       Zone phone for oncoming shift:          Activity:  Level of Assistance: Minimal assist, patient does 75% or more  Number times ambulated in hallways past shift: 1  Number of times OOB to chair past shift: 1    Cardiac:   Cardiac Monitoring: Yes      Cardiac Rhythm: Sinus rachid    Access:  Current line(s): PIV     Genitourinary:   Urinary Status: Voiding, Bathroom privileges    Respiratory:   O2 Device: None (Room air)  Chronic home O2 use?: NO  Incentive spirometer at bedside: NO    GI:  Last BM (including prior to admit): 07/10/25  Current diet:  ADULT DIET; Regular; Low Fat/Low Chol/High Fiber/MOE  Passing flatus: YES    Pain Management:   Patient states pain is manageable on current regimen: YES    Skin:  Tejinder Scale Score: 18  Interventions: Wound Offloading (Prevention Methods): Bed, pressure redistribution/air    Patient Safety:  Fall Risk: Nursing Judgement-Fall Risk High(Add Comments): Yes  Fall Risk Interventions  Nursing Judgement-Fall Risk High(Add Comments): Yes  Toilet Every 2 Hours-In Advance of Need: Yes  Hourly Visual Checks: Awake, In bed  Fall Visual Posted: Socks  Room Door Open: Yes  Alarm On: Bed  Patient Moved Closer to Nursing Station: No    Active Consults:   IP CONSULT TO NEUROLOGY  IP CONSULT TO CASE MANAGEMENT  IP CONSULT TO CARDIOLOGY    Length of Stay:  Expected LOS: 3  Actual LOS: 1    Katie Barlow, RN

## 2025-07-12 NOTE — PLAN OF CARE
Problem: Discharge Planning  Goal: Discharge to home or other facility with appropriate resources  Outcome: Progressing     Problem: Skin/Tissue Integrity  Goal: Skin integrity remains intact  Description: 1.  Monitor for areas of redness and/or skin breakdown  2.  Assess vascular access sites hourly  3.  Every 4-6 hours minimum:  Change oxygen saturation probe site  4.  Every 4-6 hours:  If on nasal continuous positive airway pressure, respiratory therapy assess nares and determine need for appliance change or resting period  Outcome: Progressing     Problem: Safety - Adult  Goal: Free from fall injury  Outcome: Progressing     Problem: ABCDS Injury Assessment  Goal: Absence of physical injury  Outcome: Progressing     Problem: Physical Therapy - Adult  Goal: By Discharge: Performs mobility at highest level of function for planned discharge setting.  See evaluation for individualized goals.  Description: FUNCTIONAL STATUS PRIOR TO ADMISSION: Independent w/ ambulation and ADLs. Denies history of falls.     HOME SUPPORT PRIOR TO ADMISSION: The patient lived with spouse who is able to assist as needed.    Physical Therapy Goals  Initiated 7/12/2025  1.  Patient will move from supine to sit and sit to supine, scoot up and down, and roll side to side in bed with independence within 7 day(s).    2.  Patient will perform sit to stand with independence within 7 day(s).  3.  Patient will transfer from bed to chair and chair to bed with independence using the least restrictive device within 7 day(s).  4.  Patient will ambulate with independence for 300 feet with the least restrictive device within 7 day(s).   7/12/2025 1309 by Luanne Lai, PT  Outcome: Progressing

## 2025-07-13 ENCOUNTER — TELEPHONE (OUTPATIENT)
Age: 85
End: 2025-07-13

## 2025-07-13 VITALS
SYSTOLIC BLOOD PRESSURE: 134 MMHG | RESPIRATION RATE: 18 BRPM | OXYGEN SATURATION: 96 % | HEIGHT: 66 IN | HEART RATE: 63 BPM | WEIGHT: 147.49 LBS | TEMPERATURE: 97.9 F | DIASTOLIC BLOOD PRESSURE: 51 MMHG | BODY MASS INDEX: 23.7 KG/M2

## 2025-07-13 LAB
ANION GAP SERPL CALC-SCNC: 3 MMOL/L (ref 2–12)
BASOPHILS # BLD: 0.02 K/UL (ref 0–0.1)
BASOPHILS NFR BLD: 0.2 % (ref 0–1)
BUN SERPL-MCNC: 18 MG/DL (ref 6–20)
BUN/CREAT SERPL: 17 (ref 12–20)
CALCIUM SERPL-MCNC: 8.6 MG/DL (ref 8.5–10.1)
CHLORIDE SERPL-SCNC: 106 MMOL/L (ref 97–108)
CO2 SERPL-SCNC: 26 MMOL/L (ref 21–32)
CREAT SERPL-MCNC: 1.06 MG/DL (ref 0.55–1.02)
DIFFERENTIAL METHOD BLD: ABNORMAL
EOSINOPHIL # BLD: 0.07 K/UL (ref 0–0.4)
EOSINOPHIL NFR BLD: 0.7 % (ref 0–7)
ERYTHROCYTE [DISTWIDTH] IN BLOOD BY AUTOMATED COUNT: 15.4 % (ref 11.5–14.5)
GLUCOSE SERPL-MCNC: 88 MG/DL (ref 65–100)
HCT VFR BLD AUTO: 34.5 % (ref 35–47)
HGB BLD-MCNC: 11 G/DL (ref 11.5–16)
IMM GRANULOCYTES # BLD AUTO: 0.04 K/UL (ref 0–0.04)
IMM GRANULOCYTES NFR BLD AUTO: 0.4 % (ref 0–0.5)
LYMPHOCYTES # BLD: 2.05 K/UL (ref 0.8–3.5)
LYMPHOCYTES NFR BLD: 20.4 % (ref 12–49)
MCH RBC QN AUTO: 29.3 PG (ref 26–34)
MCHC RBC AUTO-ENTMCNC: 31.9 G/DL (ref 30–36.5)
MCV RBC AUTO: 92 FL (ref 80–99)
MONOCYTES # BLD: 0.91 K/UL (ref 0–1)
MONOCYTES NFR BLD: 9 % (ref 5–13)
NEUTS SEG # BLD: 6.98 K/UL (ref 1.8–8)
NEUTS SEG NFR BLD: 69.3 % (ref 32–75)
NRBC # BLD: 0 K/UL (ref 0–0.01)
NRBC BLD-RTO: 0 PER 100 WBC
PLATELET # BLD AUTO: 259 K/UL (ref 150–400)
PMV BLD AUTO: 11.5 FL (ref 8.9–12.9)
POTASSIUM SERPL-SCNC: 4.3 MMOL/L (ref 3.5–5.1)
RBC # BLD AUTO: 3.75 M/UL (ref 3.8–5.2)
SODIUM SERPL-SCNC: 135 MMOL/L (ref 136–145)
VIT B12 SERPL-MCNC: 304 PG/ML (ref 193–986)
WBC # BLD AUTO: 10.1 K/UL (ref 3.6–11)

## 2025-07-13 PROCEDURE — 6370000000 HC RX 637 (ALT 250 FOR IP): Performed by: STUDENT IN AN ORGANIZED HEALTH CARE EDUCATION/TRAINING PROGRAM

## 2025-07-13 PROCEDURE — 97535 SELF CARE MNGMENT TRAINING: CPT

## 2025-07-13 PROCEDURE — 6370000000 HC RX 637 (ALT 250 FOR IP): Performed by: INTERNAL MEDICINE

## 2025-07-13 PROCEDURE — 80048 BASIC METABOLIC PNL TOTAL CA: CPT

## 2025-07-13 PROCEDURE — 97530 THERAPEUTIC ACTIVITIES: CPT

## 2025-07-13 PROCEDURE — 85025 COMPLETE CBC W/AUTO DIFF WBC: CPT

## 2025-07-13 PROCEDURE — 82607 VITAMIN B-12: CPT

## 2025-07-13 PROCEDURE — 99232 SBSQ HOSP IP/OBS MODERATE 35: CPT | Performed by: PSYCHIATRY & NEUROLOGY

## 2025-07-13 PROCEDURE — 97165 OT EVAL LOW COMPLEX 30 MIN: CPT

## 2025-07-13 PROCEDURE — 2500000003 HC RX 250 WO HCPCS: Performed by: STUDENT IN AN ORGANIZED HEALTH CARE EDUCATION/TRAINING PROGRAM

## 2025-07-13 PROCEDURE — 36415 COLL VENOUS BLD VENIPUNCTURE: CPT

## 2025-07-13 PROCEDURE — 6360000002 HC RX W HCPCS: Performed by: STUDENT IN AN ORGANIZED HEALTH CARE EDUCATION/TRAINING PROGRAM

## 2025-07-13 RX ORDER — THIAMINE MONONITRATE (VIT B1) 100 MG
100 TABLET ORAL DAILY
Qty: 30 TABLET | Refills: 0 | Status: SHIPPED | OUTPATIENT
Start: 2025-07-13

## 2025-07-13 RX ORDER — THIAMINE HYDROCHLORIDE 100 MG/ML
100 INJECTION, SOLUTION INTRAMUSCULAR; INTRAVENOUS DAILY
Qty: 1 EACH | Refills: 0 | Status: CANCELLED | OUTPATIENT
Start: 2025-07-14

## 2025-07-13 RX ORDER — LOSARTAN POTASSIUM 50 MG/1
100 TABLET ORAL DAILY
Qty: 30 TABLET | Refills: 0 | Status: SHIPPED | OUTPATIENT
Start: 2025-07-13

## 2025-07-13 RX ORDER — HYDRALAZINE HYDROCHLORIDE 50 MG/1
50 TABLET, FILM COATED ORAL 2 TIMES DAILY
Qty: 60 TABLET | Refills: 0 | Status: SHIPPED | OUTPATIENT
Start: 2025-07-13

## 2025-07-13 RX ADMIN — ASPIRIN 81 MG: 81 TABLET, CHEWABLE ORAL at 08:45

## 2025-07-13 RX ADMIN — ENOXAPARIN SODIUM 40 MG: 100 INJECTION SUBCUTANEOUS at 08:45

## 2025-07-13 RX ADMIN — PANTOPRAZOLE SODIUM 40 MG: 40 TABLET, DELAYED RELEASE ORAL at 06:07

## 2025-07-13 RX ADMIN — LOSARTAN POTASSIUM 100 MG: 100 TABLET, FILM COATED ORAL at 08:45

## 2025-07-13 RX ADMIN — THIAMINE HYDROCHLORIDE 100 MG: 100 INJECTION, SOLUTION INTRAMUSCULAR; INTRAVENOUS at 08:46

## 2025-07-13 RX ADMIN — SENNOSIDES AND DOCUSATE SODIUM 1 TABLET: 50; 8.6 TABLET ORAL at 08:45

## 2025-07-13 RX ADMIN — CITALOPRAM HYDROBROMIDE 20 MG: 20 TABLET ORAL at 08:45

## 2025-07-13 RX ADMIN — SODIUM CHLORIDE, PRESERVATIVE FREE 10 ML: 5 INJECTION INTRAVENOUS at 08:46

## 2025-07-13 RX ADMIN — HYDRALAZINE HYDROCHLORIDE 50 MG: 50 TABLET ORAL at 06:07

## 2025-07-13 ASSESSMENT — PAIN SCALES - GENERAL: PAINLEVEL_OUTOF10: 0

## 2025-07-13 NOTE — CARE COORDINATION
Transition of Care Plan:    RUR: 15%-\"Moderate Risk\"  Prior Level of Functioning: Independent in her ADLs and IADLs  Disposition: Home with Home Health Services-CM has provided the patient and her family with a listing of various HH companies for their review  BABAK: 7/14/25  If SNF or IPR: Date FOC offered: N/A  Follow up appointments: PCP & Specialists as indicated   DME needed: The patient owns a cane and a front-wheeled walker but she rarely uses either device when ambulating  Transportation at discharge: The patient's daughter or son will assist with d/c transportation  IM/IMM Medicare/ letter given: 1st IM given and signed on 7/11/25  Is patient a [place] and connected with VA? N/A   If yes, was [place] transfer form completed and VA notified?   Caregiver Contact: Hugh Montano, , Phone: [phone]  Discharge Caregiver contacted prior to discharge? CM spoke to the patient's , daughter, and 2 sons at bedside  Care Conference needed? No  Barriers to discharge: Pending medical clearance, HH choice and acceptance    The patient uses [facility] for mail order medications and she uses the [facility] in [place], [place] for medications that she needs immediately. She had home health services back in 2019 but she could not remember the name of the agency. She has never been to a SNF/IPR facility in the past. PT/OT have recommended home health services for the patient and CM has provided the patient with a listing of various HH agencies for her to review with her family. She plans to provide CM with her preferred HH agency.       07/13/25 1323   Service Assessment   Patient Orientation Alert and Oriented   Cognition Alert   History Provided By Patient;Spouse   Primary Caregiver Self   Support Systems Spouse/Significant Other;Children  (The patient lives with her . She has 1 son and 1 daughter that live less than 3 miles away from her. She has 1 other son that lives in [place]

## 2025-07-13 NOTE — PLAN OF CARE
Problem: Occupational Therapy - Adult  Goal: By Discharge: Performs self-care activities at highest level of function for planned discharge setting.  See evaluation for individualized goals.  Description: FUNCTIONAL STATUS PRIOR TO ADMISSION:  pt reports indepdence w/ ADLs and mobility   , Prior Level of Assist for ADLs: Independent,  ,  ,  ,  ,  , Prior Level of Assist for Homemaking: Independent, Ambulation Assistance: Independent, Prior Level of Assist for Transfers: Independent, Active : No     HOME SUPPORT: Patient lived w/ spouse who can assist, PRN.    Occupational Therapy Goals:  Initiated 7/13/2025  1.  Patient will perform grooming with Armstrong within 7 day(s).  2.  Patient will perform upper body dressing with Armstrong within 7 day(s).  3.  Patient will perform lower body dressing with Armstrong within 7 day(s).  4.  Patient will perform toilet transfers with Armstrong  within 7 day(s).  5.  Patient will perform all aspects of toileting with Armstrong within 7 day(s).  6.  Patient will participate in upper extremity therapeutic exercise/activities with Supervision for 5 minutes within 7 day(s).    7.  Patient will utilize energy conservation techniques during functional activities with verbal cues within 7 day(s).   7/13/2025 1037 by Gregg Higgins OT  Outcome: Progressing     Problem: Discharge Planning  Goal: Discharge to home or other facility with appropriate resources  Outcome: Adequate for Discharge     Problem: Skin/Tissue Integrity  Goal: Skin integrity remains intact  Description: 1.  Monitor for areas of redness and/or skin breakdown  2.  Assess vascular access sites hourly  3.  Every 4-6 hours minimum:  Change oxygen saturation probe site  4.  Every 4-6 hours:  If on nasal continuous positive airway pressure, respiratory therapy assess nares and determine need for appliance change or resting period  Outcome: Adequate for Discharge     Problem: Safety - Adult  Goal:

## 2025-07-13 NOTE — DISCHARGE SUMMARY
Discharge Summary    Name: Bandar Montano  [id number]  YOB: 1940 (Age: 85 y.o.)   Date of Admission: 7/11/2025  Date of Discharge: 7/13/2025  Attending Physician: No att. providers found    Discharge Diagnosis:   CVA evaluation, POA ruled out  Hypertensive crisis, POA  Sinus bradycardia, POA  Hx syncope  Hx uncontrolled HTN with hypertensive urgency  CKD stage3  GERD  Hyperlipidemia  Mood disorder  Consultations:  IP CONSULT TO NEUROLOGY  IP CONSULT TO CASE MANAGEMENT  IP CONSULT TO CARDIOLOGY  IP CONSULT HOME HEALTH      Brief Admission History/Reason for Admission Per Magdiel Pierce MD:   Bandar Montano is a 85 y.o.  female with PMHx significant for uncontrolled hypertension, HLD, Hx syncope was brought in by family due to concerns of confusion and slurred speech.  Patient reports headaches that started today, family reports patient is confused, and had slurred speech at home.  Denies any droopy face, arm or leg weakness.  Does endorse headache, but no blurry vision.  Denies any similar episodes in the past.  Patient was recently here a month ago for syncopal episode.  Denies any falls or recent trauma.  We were asked to admit for work up and evaluation of the above problems.        Brief Hospital Course by Main Problems:   CVA evaluation, POA ruled out  Hypertensive crisis, POA  Sinus bradycardia, POA  Hx syncope  Hx uncontrolled HTN with hypertensive urgency  CKD stage3  GERD  Hyperlipidemia  Mood disorder  -Presents with headache, confusion, slurry speech  - Recently hospitalized for syncopal episode on 6/15/2025, at that time echo shows EF of 70%, carotid ultrasound negative for significant stenosis.  - Blood pressure on this admission was in the 200s systolic, bradycardia 40s-50s  -Has a loop recorder monitor for bradycardia by cardiology  - CT head negative for intracranial pathology  - CBC negative for leukocytosis  - CMP showed hyponatremia  -UA  MG tablet             DISPOSITION:    Home with Family:       Home with HH/PT/OT/RN: x   SNF/LTC:    ROMI:    OTHER:            Code status:   Recommended diet: cardiac diet  Recommended activity: activity as tolerated  Wound care: None      Follow up with:   PCP : Hayden Lopez MD Thomas, Dennis L, MD  [address] [facility] [id number] [phone]    Follow up      Jeison Gibson MD  [address] [facility] [address]  [place] [id number] [phone]    Follow up      Marvin Singletary DO  [address] [facility]  [address]  [address]  [place] [id number] [phone]    Follow up in 1 month(s)            Total time in minutes spent coordinating this discharge (includes going over instructions, follow-up, prescriptions, and preparing report for sign off to her PCP) :  35 minutes

## 2025-07-13 NOTE — PROGRESS NOTES
Orders received, chart reviewed and patient evaluated by occupational therapy. Pending progression with skilled acute occupational therapy, recommend:    Intermittent occupational therapy up to 2-3x/week in previous living setting    Recommend with nursing patient to complete as able in order to maintain strength, endurance and independence: OOB to chair 3x/day, ADLs with SBA and performing toileting with SBA assist. Thank you for your assistance.     Full evaluation to follow.

## 2025-07-13 NOTE — CARE COORDINATION
Transition of Care Plan:    RUR: 15%-\"Moderate Risk\"  Prior Level of Functioning: Independent in her ADLs and IADLs  Disposition: Home with  services-the patient's preferred  agency is [facility]. [facility] has accepted the patient or services  BABAK: 7/13/25  If SNF or IPR: Date FOC offered: N/A  Follow up appointments: PCP & Specialists as indicated  DME needed: The patient owns a cane and a front-wheeled walker but she rarely uses either device when ambulating   Transportation at discharge: The patient's daughter is at bedside and will be transporting her home  IM/IMM Medicare/ letter given: 1st IM given & signed on 711/25  Is patient a [place] and connected with VA? N/A   If yes, was  transfer form completed and VA notified?   Caregiver Contact: Hugh Montano, , Phone: [phone]  Discharge Caregiver contacted prior to discharge?  will contact her  if she requests this  Care Conference needed? No  Barriers to discharge: N/A-patient is being discharged today      was alerted that the patient is being discharged today, 7/13/25.  met with the patient, her daughter, and her grandson and [facility]  has sent the patient's referral to [facility] for review. [facility] has accepted the patient for services. Her daughter is at bedside and will be transporting her home. 1st IM was given and signed on 7/11/25.     From CM perspective, the patient can be discharged.     Kenyatta Mendieta, [facility]  [phone]

## 2025-07-13 NOTE — PROGRESS NOTES
Neurology Note    Patient ID:  Bandar Montano  [id number]  85 y.o.  1940      Date of Consultation:  July 13, 2025        Assessment and Plan:    The patient is a pleasant 85-year-old female with transient episode of confusion and headache.  Her neurological examination reveals no focal sensory or motor deficit.  There is mild cognitive slowing.    Transient headache and confusion:  Resolved   Head CT with no acute abnormality  Most consistent with hypertensive emergency.  Brain MRI with no evidence of an acute stroke  Continue 81 mg aspirin  Risk factors for stroke to include hypertension and dyslipidemia  Hypertension: Aggressive control with goal blood pressure of less than 140/90  Dyslipidemia: LDL is at goal.  Continue current statin therapy  Cardiology consult was performed due to hypertension and bradycardia  Hemoglobin A1c at goal  Echocardiogram was completed within the past month  Carotid Doppler study revealed no large vessel stenosis  PT and OT consultation      Mild cognitive impairment:  I did discuss this at length with the patient and her daughter yesterday and reviewed again with the patient today  After discharge, the patient will be scheduled for follow-up in the neurology clinic for more detailed cognitive testing and additional lab testing if necessary  Recent tsh was normal  Vitamin b12 level pending - if low, start supplementation      There is no other additional neurology recommendations at this time.  If questions arise, please not hesitate to contact me and I will return to see the patient.  The patient should follow-up in clinic in approximately 4 - 8 weeks time after discharge.           Subjective:I feel better today       History of Present Illness:   Bandar Montano is a 85 y.o. female with a history of uncontrolled hypertension, dyslipidemia and syncope who was brought to the hospital by family due to increasing confusion and slurred speech.  The patient also had a headache  5 5 5 5 5 5 5 5 5         Sensory:  Upper extremity: intact to pp,  Lower extremity: intact to pp,      Reflexes:    Right Left  Biceps  2 2  Triceps 2 2  Brachiorad. 2 2  Patella  2 2  Achilles 1 1    Plantar response:  flexor bilaterally    Cerebellar testing:  no tremor apparent, finger/nose and breanne were intact    Labs:     Lab Results   Component Value Date/Time     07/13/2025 05:17 AM    K 4.3 07/13/2025 05:17 AM     07/13/2025 05:17 AM    BUN 18 07/13/2025 05:17 AM    WBC 10.1 07/13/2025 05:17 AM    HCT 34.5 07/13/2025 05:17 AM    HGB 11.0 07/13/2025 05:17 AM     07/13/2025 05:17 AM    LDL 46.6 07/11/2025 06:36 PM                      Principal Problem:    Hypertensive crisis  Active Problems:    Hypertensive encephalopathy    Altered mental status    MCI (mild cognitive impairment)    Slurred speech  Resolved Problems:    * No resolved hospital problems. *      I spent   38  minutes providing care to this  acutely ill inpatient with > 50% of the time counseling and assisting in the coordination of care of the patient on the patient's hospital floor/unit.               Signed By:  Marvin Singletary DO FAAN    July 13, 2025

## 2025-07-13 NOTE — PLAN OF CARE
Problem: Safety - Adult  Goal: Free from fall injury  7/13/2025 0016 by Carolyn Cates RN  Outcome: Progressing  Note: Bed alarm is on, the bed is in the lowest position and wheels are locked, call bell is within reach, bathroom light is on during evening hours, gripper socks are on and patient has been instructed to call out for assistance if needed.     As of now, patient is free from falls and will continue to be monitored.

## 2025-07-13 NOTE — PLAN OF CARE
Problem: Occupational Therapy - Adult  Goal: By Discharge: Performs self-care activities at highest level of function for planned discharge setting.  See evaluation for individualized goals.  Description: FUNCTIONAL STATUS PRIOR TO ADMISSION:  pt reports indepdence w/ ADLs and mobility   , Prior Level of Assist for ADLs: Independent,  ,  ,  ,  ,  , Prior Level of Assist for Homemaking: Independent, Ambulation Assistance: Independent, Prior Level of Assist for Transfers: Independent, Active : No     HOME SUPPORT: Patient lived w/ spouse who can assist, PRN.    Occupational Therapy Goals:  Initiated 7/13/2025  1.  Patient will perform grooming with Pratt within 7 day(s).  2.  Patient will perform upper body dressing with Pratt within 7 day(s).  3.  Patient will perform lower body dressing with Pratt within 7 day(s).  4.  Patient will perform toilet transfers with Pratt  within 7 day(s).  5.  Patient will perform all aspects of toileting with Pratt within 7 day(s).  6.  Patient will participate in upper extremity therapeutic exercise/activities with Supervision for 5 minutes within 7 day(s).    7.  Patient will utilize energy conservation techniques during functional activities with verbal cues within 7 day(s).   Outcome: Progressing    OCCUPATIONAL THERAPY EVALUATION    Patient: Bandar Montano (85 y.o. female)  Date: 7/13/2025  Primary Diagnosis: Tension headache [G44.209]  Hypertensive encephalopathy [I67.4]  Hypertensive crisis [I16.9]  PRESLEY (acute kidney injury) [N17.9]  Altered mental status, unspecified altered mental status type [R41.82]         Precautions:                    ASSESSMENT :  The patient is limited by decreased functional mobility, independence in ADLs, high-level IADLs, strength, activity tolerance, endurance, general weakness, delayed processing, balance. Pt presents w/ delayed processing/initiation of tasks even when prompted; MRI of brain negative  Laterality Date    APPENDECTOMY      CATARACT REMOVAL      BILAT WITH IOL with bilateral lens implants    COLONOSCOPY N/A 11/13/2019    COLONOSCOPY performed by Demarco Urbina MD at [facility] ENDOSCOPY    COLONOSCOPY N/A 6/5/2024    COLONOSCOPY performed by Demarco Urbina MD at [facility] ENDOSCOPY    COLONOSCOPY FLX DX W/COLLJ SPEC WHEN PFRMD  6/25/2012         HYSTERECTOMY (CERVIX STATUS UNKNOWN)      KNEE ARTHROSCOPY      RIGHT KNEE     MOHS SURGERY      RIGHT SHOULDER--ROTATOR CUFF    ORTHOPEDIC SURGERY Right     shoulder - torn rotatory cuff and bone spur    ORTHOPEDIC SURGERY  6/2009    RIGHT TKR    OVARY REMOVAL      MO UNLISTED PROCEDURE ABDOMEN PERITONEUM & OMENTUM      LYSIS OF ADHESIONS    TUBAL LIGATION      UPPER GASTROINTESTINAL ENDOSCOPY N/A 6/5/2024    ESOPHAGOGASTRODUODENOSCOPY performed by Demarco Urbina MD at [facility] ENDOSCOPY    UROLOGICAL SURGERY      bladder sling          Expanded or extensive additional review of patient history:   Social/Functional History  Lives With: Spouse  Type of Home: House  Home Layout: One level  Home Access: Stairs to enter with rails  Entrance Stairs - Number of Steps: 3  Entrance Stairs - Rails: Both  Home Equipment: Cane, Walker - Rolling  Prior Level of Assist for ADLs: Independent  Prior Level of Assist for Homemaking: Independent  Prior Level of Assist for Transfers: Independent  Active : No  Patient's  Info: The patient's  drives short distances and her daughter assists with transportation needs for longer distances  Mode of Transportation: Car  Occupation: Retired      Hand Dominance: unknown     EXAMINATION OF PERFORMANCE DEFICITS:    Cognitive/Behavioral Status:  Orientation  Overall Orientation Status: Within Functional Limits  Cognition  Overall Cognitive Status: WFL      Vision/Perceptual:    Vision - Basic Assessment  Prior Vision: Wears glasses all the time  Patient Visual Report: No visual complaint reported.

## 2025-07-13 NOTE — PROGRESS NOTES
Discharge instructions reviewed with the patient and family. Denies any questions or concerns. Pt discharged with all belongings.

## 2025-07-17 LAB
B BURGDOR DNA SPEC QL NAA+PROBE: NEGATIVE
SPECIMEN SOURCE: NORMAL

## 2025-07-24 NOTE — PROGRESS NOTES
Physician Progress Note      PATIENT:               SYL ALBERT  CSN #:                  [id number]  :                       1940  ADMIT DATE:       2025 5:33 PM  DISCH DATE:        2025 11:29 AM  RESPONDING  PROVIDER #:        Evelyn Brown MD          QUERY TEXT:    Hypertensive urgency and hypertensive crisis are both documented in the   medical record in  H&P,  .  Please specify the type of hypertension:    The clinical indicators include:   H&P,  IM Progress Note and  DC Summary: \"Hypertensive crisis,   POA\"; \"Hx uncontrolled HTN with hypertensive urgency\"     Cardiology consult: \"Hypertensive crisis\"     Neurology consult: \"Hypertensive crisis\"     VS 17:31 /65     VS 18:16 /59     VS 19:01 /103     VS 20:01 /59            PCU level care, Nicardipine Infusion, Losartan po, Hydralazine po  Options provided:  -- Crisis  -- Emergency  -- Urgency  -- Other - I will add my own diagnosis  -- Disagree - Not applicable / Not valid  -- Disagree - Clinically unable to determine / Unknown  -- Refer to Clinical Documentation Reviewer    PROVIDER RESPONSE TEXT:    The patient is in hypertensive urgency.    Query created by: Agustina Carter on 2025 11:51 AM      QUERY TEXT:    Hypertensive encephalopathy is documented in the medical record in    Cardiology consult and  Neurology Progress Note. Please provide   additional clinical indicators supportive of your documentation. Or please   document if the diagnosis of Hypertensive Encephalopathy has been ruled out   after study.    The clinical indicators include:  From  ED Provider Note: \"Abnormal findings supporting critical care:   Hypertensive encephalopathy, altered mental status\"; \"Final Impression 1.   Hypertensive encephalopathy \"; \"Neurological:  General: No focal deficit   present.  Mental Status: She is alert and oriented to person, place, and time.

## (undated) DEVICE — BAG SPEC BIOHZRD 10 X 10 IN --

## (undated) DEVICE — SNARE ENDOSCP M L240CM W27MM SHTH DIA2.4MM CHN 2.8MM OVL

## (undated) DEVICE — SOLUTION IRRIG 3000ML 0.9% SOD CHL FLX CONT 0797208] ICU MEDICAL INC]

## (undated) DEVICE — SOLIDIFIER MEDC 1200ML -- CONVERT TO 356117

## (undated) DEVICE — ELECTRODE,RADIOTRANSLUCENT,FOAM,5PK: Brand: MEDLINE

## (undated) DEVICE — SUTURE ABSORBABLE BRAIDED 2-0 CT-1 27 IN UD VICRYL J259H

## (undated) DEVICE — SYR 50ML LR LCK 1ML GRAD NSAF --

## (undated) DEVICE — NEEDLE HYPO 18GA L1.5IN PNK S STL HUB POLYPR SHLD REG BVL

## (undated) DEVICE — Device

## (undated) DEVICE — SUTURE VCRL SZ 1 L27IN ABSRB VLT L36MM CT-1 1/2 CIR J341H

## (undated) DEVICE — STERILE POLYISOPRENE POWDER-FREE SURGICAL GLOVES: Brand: PROTEXIS

## (undated) DEVICE — ZIMMER® STERILE DISPOSABLE TOURNIQUET CUFF WITH PROTECTIVE SLEEVE AND PLC, DUAL PORT, SINGLE BLADDER, 34 IN. (86 CM)

## (undated) DEVICE — HOOD: Brand: FLYTE

## (undated) DEVICE — SYR 3ML LL TIP 1/10ML GRAD --

## (undated) DEVICE — STRAP,POSITIONING,KNEE/BODY,FOAM,4X60": Brand: MEDLINE

## (undated) DEVICE — CATH IV AUTOGRD BC PNK 20GA 25 -- INSYTE

## (undated) DEVICE — TOWEL 4 PLY TISS 19X30 SUE WHT

## (undated) DEVICE — GARMENT,MEDLINE,DVT,INT,CALF,MED, GEN2: Brand: MEDLINE

## (undated) DEVICE — NEONATAL-ADULT SPO2 SENSOR: Brand: NELLCOR

## (undated) DEVICE — NON-REM POLYHESIVE PATIENT RETURN ELECTRODE: Brand: VALLEYLAB

## (undated) DEVICE — DUAL CUT SAGITTAL BLADE

## (undated) DEVICE — KIT EVAC 0.13IN RECT TB DIA10FR 400CC PVC 3 SPR Y CONN DRN

## (undated) DEVICE — TRAP SUC MUCOUS 70ML -- MEDICHOICE MEDLINE

## (undated) DEVICE — HANDLE LT SNAP ON ULT DURABLE LENS FOR TRUMPF ALC DISPOSABLE

## (undated) DEVICE — INFECTION CONTROL KIT SYS

## (undated) DEVICE — CEMENT MIXING SYSTEM WITH FEMORAL BREAKWAY NOZZLE: Brand: REVOLUTION

## (undated) DEVICE — BANDAGE COMPR M W6INXL10YD WHT BGE VELC E MTRX HK AND LOOP

## (undated) DEVICE — Device: Brand: DRILL QUICK RELEASE

## (undated) DEVICE — Z DISCONTINUED PER MEDLINE LINE GAS SAMPLING O2/CO2 LNG AD 13 FT NSL W/ TBNG FILTERLINE

## (undated) DEVICE — BLOCK BITE ENDOSCP AD 21 MM W/ DIL BLU LF DISP

## (undated) DEVICE — SOLUTION IRRIG 1000ML H2O STRL BLT

## (undated) DEVICE — 3M™ IOBAN™ 2 ANTIMICROBIAL INCISE DRAPE 6651EZ: Brand: IOBAN™ 2

## (undated) DEVICE — 1200 GUARD II KIT W/5MM TUBE W/O VAC TUBE: Brand: GUARDIAN

## (undated) DEVICE — 4-PORT MANIFOLD: Brand: NEPTUNE 2

## (undated) DEVICE — DILATOR ESOPHAGEAL CLEANGUIDE DISP OTW 16MM

## (undated) DEVICE — SET ADMIN 16ML TBNG L100IN 2 Y INJ SITE IV PIGGY BK DISP

## (undated) DEVICE — YANKAUER,SMOOTH HANDLE,HIGH CAPACITY: Brand: MEDLINE INDUSTRIES, INC.

## (undated) DEVICE — REM POLYHESIVE ADULT PATIENT RETURN ELECTRODE: Brand: VALLEYLAB

## (undated) DEVICE — FORCEPS BX L160CM DIA8MM GRSP DISECT CUP TIP NONLOCKING ROT

## (undated) DEVICE — CUSTOM CAST PD STR

## (undated) DEVICE — SYR 10ML LUER LOK 1/5ML GRAD --

## (undated) DEVICE — (D)PREP SKN CHLRAPRP APPL 26ML -- CONVERT TO ITEM 371833

## (undated) DEVICE — INTENDED FOR TISSUE SEPARATION, AND OTHER PROCEDURES THAT REQUIRE A SHARP SURGICAL BLADE TO PUNCTURE OR CUT.: Brand: BARD-PARKER ® CARBON RIB-BACK BLADES

## (undated) DEVICE — FORCEPS BX L240CM JAW DIA2.4MM WRK CHN 2.8MM ORNG L CAP W/

## (undated) DEVICE — CONTAINER SPEC 20 ML LID NEUT BUFF FORMALIN 10 % POLYPR STS

## (undated) DEVICE — CLEANGUIDE DISPOSABLE MARKED SPRING TIP GUIDEWIRE, 1.86 MM X 210 CM: Brand: CLEANGUIDE

## (undated) DEVICE — BASIN EMSIS 16OZ GRAPHITE PLAS KID SHP MOLD GRAD FOR ORAL

## (undated) DEVICE — HANDPIECE SET WITH COAXIAL HIGH FLOW TIP AND SUCTION TUBE: Brand: INTERPULSE

## (undated) DEVICE — YANKAUER,TAPERED BULBOUS TIP,W/O VENT: Brand: MEDLINE

## (undated) DEVICE — SUTURE ETHBND EXCEL SZ 1 L30IN NONABSORBABLE GRN L36MM CT-1 X425H

## (undated) DEVICE — STRAINER URIN CALC RNL MSH -- CONVERT TO ITEM 357634